# Patient Record
Sex: FEMALE | Race: WHITE | HISPANIC OR LATINO | Employment: OTHER | URBAN - METROPOLITAN AREA
[De-identification: names, ages, dates, MRNs, and addresses within clinical notes are randomized per-mention and may not be internally consistent; named-entity substitution may affect disease eponyms.]

---

## 2023-06-06 LAB
ANION GAP IN SER/PLAS: 13 MMOL/L (ref 10–20)
CALCIUM (MG/DL) IN SER/PLAS: 10.1 MG/DL (ref 8.6–10.3)
CARBON DIOXIDE, TOTAL (MMOL/L) IN SER/PLAS: 27 MMOL/L (ref 21–32)
CHLORIDE (MMOL/L) IN SER/PLAS: 104 MMOL/L (ref 98–107)
CREATININE (MG/DL) IN SER/PLAS: 0.9 MG/DL (ref 0.5–1.05)
ERYTHROCYTE DISTRIBUTION WIDTH (RATIO) BY AUTOMATED COUNT: 14.1 % (ref 11.5–14.5)
ERYTHROCYTE MEAN CORPUSCULAR HEMOGLOBIN CONCENTRATION (G/DL) BY AUTOMATED: 31.5 G/DL (ref 32–36)
ERYTHROCYTE MEAN CORPUSCULAR VOLUME (FL) BY AUTOMATED COUNT: 94 FL (ref 80–100)
ERYTHROCYTES (10*6/UL) IN BLOOD BY AUTOMATED COUNT: 4.56 X10E12/L (ref 4–5.2)
GFR FEMALE: 68 ML/MIN/1.73M2
GLUCOSE (MG/DL) IN SER/PLAS: 87 MG/DL (ref 74–99)
HEMATOCRIT (%) IN BLOOD BY AUTOMATED COUNT: 42.8 % (ref 36–46)
HEMOGLOBIN (G/DL) IN BLOOD: 13.5 G/DL (ref 12–16)
LEUKOCYTES (10*3/UL) IN BLOOD BY AUTOMATED COUNT: 5.7 X10E9/L (ref 4.4–11.3)
PLATELETS (10*3/UL) IN BLOOD AUTOMATED COUNT: 309 X10E9/L (ref 150–450)
POTASSIUM (MMOL/L) IN SER/PLAS: 4.4 MMOL/L (ref 3.5–5.3)
SODIUM (MMOL/L) IN SER/PLAS: 140 MMOL/L (ref 136–145)
UREA NITROGEN (MG/DL) IN SER/PLAS: 17 MG/DL (ref 6–23)

## 2023-06-22 ENCOUNTER — HOSPITAL ENCOUNTER (OUTPATIENT)
Dept: DATA CONVERSION | Facility: HOSPITAL | Age: 73
End: 2023-06-22
Attending: INTERNAL MEDICINE | Admitting: INTERNAL MEDICINE
Payer: MEDICARE

## 2023-06-22 DIAGNOSIS — R94.39 ABNORMAL RESULT OF OTHER CARDIOVASCULAR FUNCTION STUDY: ICD-10-CM

## 2023-06-22 DIAGNOSIS — I25.10 ATHEROSCLEROTIC HEART DISEASE OF NATIVE CORONARY ARTERY WITHOUT ANGINA PECTORIS: ICD-10-CM

## 2023-06-22 DIAGNOSIS — E88.810 METABOLIC SYNDROME: ICD-10-CM

## 2023-06-22 DIAGNOSIS — I10 ESSENTIAL (PRIMARY) HYPERTENSION: ICD-10-CM

## 2023-06-22 DIAGNOSIS — G62.9 POLYNEUROPATHY, UNSPECIFIED: ICD-10-CM

## 2023-06-22 DIAGNOSIS — R07.9 CHEST PAIN, UNSPECIFIED: ICD-10-CM

## 2023-06-22 DIAGNOSIS — K21.9 GASTRO-ESOPHAGEAL REFLUX DISEASE WITHOUT ESOPHAGITIS: ICD-10-CM

## 2023-06-22 DIAGNOSIS — J44.9 CHRONIC OBSTRUCTIVE PULMONARY DISEASE, UNSPECIFIED (MULTI): ICD-10-CM

## 2023-08-31 PROBLEM — L65.9 HAIR LOSS: Status: ACTIVE | Noted: 2023-08-31

## 2023-08-31 PROBLEM — M51.369 DEGENERATION OF LUMBAR INTERVERTEBRAL DISC: Status: ACTIVE | Noted: 2023-08-31

## 2023-08-31 PROBLEM — M51.36 DEGENERATION OF LUMBAR INTERVERTEBRAL DISC: Status: ACTIVE | Noted: 2023-08-31

## 2023-08-31 PROBLEM — G43.909 MIGRAINE: Status: ACTIVE | Noted: 2023-08-31

## 2023-08-31 PROBLEM — E78.00 ELEVATED CHOLESTEROL: Status: ACTIVE | Noted: 2023-08-31

## 2023-08-31 PROBLEM — B00.2 HERPES GINGIVOSTOMATITIS: Status: ACTIVE | Noted: 2023-08-31

## 2023-08-31 PROBLEM — M25.572 CHRONIC PAIN OF LEFT ANKLE: Status: ACTIVE | Noted: 2023-08-31

## 2023-08-31 PROBLEM — R20.0 NUMBNESS: Status: ACTIVE | Noted: 2023-08-31

## 2023-08-31 PROBLEM — K22.70 BARRETT'S ESOPHAGUS: Status: ACTIVE | Noted: 2023-08-31

## 2023-08-31 PROBLEM — R73.09 ELEVATED GLUCOSE: Status: ACTIVE | Noted: 2023-08-31

## 2023-08-31 PROBLEM — Z98.890 HISTORY OF NISSEN FUNDOPLICATION: Status: ACTIVE | Noted: 2023-08-31

## 2023-08-31 PROBLEM — E88.810 DYSMETABOLIC SYNDROME: Status: ACTIVE | Noted: 2023-08-31

## 2023-08-31 PROBLEM — G62.9 PERIPHERAL NEUROPATHY: Status: ACTIVE | Noted: 2023-08-31

## 2023-08-31 PROBLEM — I10 BENIGN ESSENTIAL HYPERTENSION: Status: ACTIVE | Noted: 2023-08-31

## 2023-08-31 PROBLEM — R06.02 CHRONIC SHORTNESS OF BREATH: Status: ACTIVE | Noted: 2023-08-31

## 2023-08-31 PROBLEM — K52.9 COLITIS: Status: ACTIVE | Noted: 2023-08-31

## 2023-08-31 PROBLEM — G89.29 CHRONIC PAIN OF LEFT ANKLE: Status: ACTIVE | Noted: 2023-08-31

## 2023-08-31 PROBLEM — R94.39 ABNORMAL STRESS ECHOCARDIOGRAM: Status: ACTIVE | Noted: 2023-08-31

## 2023-08-31 PROBLEM — J44.9 COPD (CHRONIC OBSTRUCTIVE PULMONARY DISEASE) (MULTI): Status: ACTIVE | Noted: 2023-08-31

## 2023-08-31 PROBLEM — M75.31 CALCIFIC TENDINITIS OF RIGHT SHOULDER: Status: ACTIVE | Noted: 2023-08-31

## 2023-08-31 PROBLEM — J18.9 PNEUMONIA: Status: ACTIVE | Noted: 2023-08-31

## 2023-08-31 PROBLEM — G56.01 RIGHT CARPAL TUNNEL SYNDROME: Status: ACTIVE | Noted: 2023-08-31

## 2023-08-31 PROBLEM — E78.2 MIXED HYPERLIPIDEMIA: Status: ACTIVE | Noted: 2023-08-31

## 2023-08-31 PROBLEM — M81.0 AGE-RELATED OSTEOPOROSIS WITHOUT CURRENT PATHOLOGICAL FRACTURE: Status: ACTIVE | Noted: 2023-08-31

## 2023-08-31 PROBLEM — R07.9 CHEST PAIN: Status: ACTIVE | Noted: 2023-08-31

## 2023-08-31 PROBLEM — R20.2 NOTALGIA PARESTHETICA: Status: ACTIVE | Noted: 2023-08-31

## 2023-08-31 PROBLEM — E55.9 VITAMIN D DEFICIENCY: Status: ACTIVE | Noted: 2023-08-31

## 2023-08-31 PROBLEM — K21.9 GERD WITHOUT ESOPHAGITIS: Status: ACTIVE | Noted: 2023-08-31

## 2023-08-31 RX ORDER — ESOMEPRAZOLE MAGNESIUM 40 MG/1
40 GRANULE, DELAYED RELEASE ORAL DAILY
COMMUNITY
Start: 2006-04-03 | End: 2023-10-03 | Stop reason: ALTCHOICE

## 2023-08-31 RX ORDER — DEXTROMETHORPHAN HYDROBROMIDE, GUAIFENESIN 5; 100 MG/5ML; MG/5ML
1300 LIQUID ORAL AS NEEDED
COMMUNITY

## 2023-08-31 RX ORDER — GABAPENTIN 600 MG/1
600 TABLET ORAL 3 TIMES DAILY
Status: ON HOLD | COMMUNITY
Start: 2021-09-17 | End: 2023-11-16 | Stop reason: SDUPTHER

## 2023-08-31 RX ORDER — ALBUTEROL SULFATE 90 UG/1
2 AEROSOL, METERED RESPIRATORY (INHALATION) EVERY 6 HOURS PRN
COMMUNITY

## 2023-08-31 RX ORDER — GLUCOSAM/CHONDRO/HERB 149/HYAL 750-100 MG
1 TABLET ORAL DAILY
COMMUNITY

## 2023-08-31 RX ORDER — TRIAMCINOLONE ACETONIDE 1 MG/G
1 CREAM TOPICAL 2 TIMES DAILY PRN
COMMUNITY
Start: 2022-12-27 | End: 2024-04-03 | Stop reason: SDUPTHER

## 2023-08-31 RX ORDER — LISINOPRIL 10 MG/1
10 TABLET ORAL DAILY
COMMUNITY
Start: 2021-09-17 | End: 2024-03-31

## 2023-08-31 RX ORDER — PANTOPRAZOLE SODIUM 40 MG/1
40 TABLET, DELAYED RELEASE ORAL DAILY
COMMUNITY
End: 2024-03-31

## 2023-08-31 RX ORDER — ZOLMITRIPTAN 5 MG/1
5 TABLET, FILM COATED ORAL
COMMUNITY
Start: 2010-11-23 | End: 2023-10-03 | Stop reason: ALTCHOICE

## 2023-08-31 RX ORDER — SIMVASTATIN 20 MG/1
20 TABLET, FILM COATED ORAL NIGHTLY
COMMUNITY
Start: 2021-11-26 | End: 2024-04-30

## 2023-08-31 RX ORDER — PREDNISONE 20 MG/1
20 TABLET ORAL 2 TIMES DAILY
COMMUNITY
End: 2023-10-03 | Stop reason: ALTCHOICE

## 2023-08-31 RX ORDER — ASPIRIN 81 MG/1
81 TABLET ORAL DAILY
COMMUNITY

## 2023-09-27 DIAGNOSIS — R55 SYNCOPE AND COLLAPSE: Primary | ICD-10-CM

## 2023-09-27 DIAGNOSIS — Z95.0 PRESENCE OF CARDIAC PACEMAKER: ICD-10-CM

## 2023-09-30 NOTE — H&P
History & Physical Reviewed:   I have reviewed the History and Physical dated:  24-May-2023   History and Physical reviewed and relevant findings noted. Patient examined to review pertinent physical  findings.: Significant findings noted below   Findings: Patient had episode of CP that was relieved  after taking 4 baby ASA. She underwent stress testing on 6-5-2023 and was abnormal stress echo with wall motion abnormalities in the septal wall.   Home Medications Reviewed: no changes noted   Allergies Reviewed: no changes noted       Airway/Sedation Assessment:  ·  Emotional Status calm   ·  Neurologic alert & oriented x 3   ·  Respiratory clear to auscultation   ·  Cardiovascular rhythm & rate regular   ·  Mouth Opening OK yes   ·  Neck Flexibility OK yes   ·  Loose Teeth no   ·  Oropharyngeal Classification Class III   ·  ASA PS Classification ASA II   ·  Sedation Plan moderate sedation       ERAS (Enhanced Recovery After Surgery):  ·  ERAS Patient: no     Consent:   COVID-19 Consent:  ·  COVID-19 Risk Consent Surgeon has reviewed key risks related to the risk of byron COVID-19 and if they contract COVID-19 what the risks are.     Assessment/Plan:   ·  Assessment and Plan    Impression: CP with abnormal stress echo     Plan: [facility]       Electronic Signatures:  Charlette Morris (APRN-CNP)  (Signed 22-Jun-2023 09:30)   Authored: History & Physical Reviewed, Airway/Sedation,  ERAS, Consent, Assessment/Plan, Note Completion      Last Updated: 22-Jun-2023 09:30 by Charlette Morris (APRN-CNP)

## 2023-10-03 ENCOUNTER — OFFICE VISIT (OUTPATIENT)
Dept: PULMONOLOGY | Facility: HOSPITAL | Age: 73
End: 2023-10-03
Payer: MEDICARE

## 2023-10-03 ENCOUNTER — PATIENT MESSAGE (OUTPATIENT)
Dept: PRIMARY CARE | Facility: CLINIC | Age: 73
End: 2023-10-03

## 2023-10-03 VITALS
DIASTOLIC BLOOD PRESSURE: 84 MMHG | RESPIRATION RATE: 16 BRPM | HEIGHT: 69 IN | BODY MASS INDEX: 28.08 KG/M2 | HEART RATE: 72 BPM | TEMPERATURE: 97.9 F | SYSTOLIC BLOOD PRESSURE: 132 MMHG | OXYGEN SATURATION: 97 % | WEIGHT: 189.6 LBS

## 2023-10-03 DIAGNOSIS — K21.9 GASTROESOPHAGEAL REFLUX DISEASE WITHOUT ESOPHAGITIS: ICD-10-CM

## 2023-10-03 DIAGNOSIS — Z87.891 FORMER SMOKER: ICD-10-CM

## 2023-10-03 DIAGNOSIS — J44.9 CHRONIC OBSTRUCTIVE PULMONARY DISEASE, UNSPECIFIED COPD TYPE (MULTI): Primary | ICD-10-CM

## 2023-10-03 DIAGNOSIS — J20.9 ACUTE BRONCHITIS, UNSPECIFIED ORGANISM: Primary | ICD-10-CM

## 2023-10-03 DIAGNOSIS — R91.8 MULTIPLE LUNG NODULES: ICD-10-CM

## 2023-10-03 DIAGNOSIS — J30.9 ALLERGIC RHINITIS, UNSPECIFIED SEASONALITY, UNSPECIFIED TRIGGER: ICD-10-CM

## 2023-10-03 PROCEDURE — 99213 OFFICE O/P EST LOW 20 MIN: CPT | Mod: ZK | Performed by: NURSE PRACTITIONER

## 2023-10-03 PROCEDURE — 3079F DIAST BP 80-89 MM HG: CPT | Performed by: NURSE PRACTITIONER

## 2023-10-03 PROCEDURE — 1160F RVW MEDS BY RX/DR IN RCRD: CPT | Performed by: NURSE PRACTITIONER

## 2023-10-03 PROCEDURE — 1159F MED LIST DOCD IN RCRD: CPT | Performed by: NURSE PRACTITIONER

## 2023-10-03 PROCEDURE — 99203 OFFICE O/P NEW LOW 30 MIN: CPT | Performed by: NURSE PRACTITIONER

## 2023-10-03 PROCEDURE — 3075F SYST BP GE 130 - 139MM HG: CPT | Performed by: NURSE PRACTITIONER

## 2023-10-03 RX ORDER — LORATADINE 10 MG/1
10 TABLET ORAL DAILY
Qty: 30 TABLET | Refills: 11 | Status: SHIPPED | OUTPATIENT
Start: 2023-10-03 | End: 2024-10-02

## 2023-10-03 RX ORDER — FLUTICASONE PROPIONATE 50 MCG
2 SPRAY, SUSPENSION (ML) NASAL AS NEEDED
COMMUNITY
Start: 2023-09-27 | End: 2023-11-08 | Stop reason: ALTCHOICE

## 2023-10-03 RX ORDER — BENZONATATE 200 MG/1
200 CAPSULE ORAL 3 TIMES DAILY PRN
Qty: 42 CAPSULE | Refills: 0 | Status: SHIPPED | OUTPATIENT
Start: 2023-10-03 | End: 2023-11-02

## 2023-10-03 RX ORDER — AZITHROMYCIN 250 MG/1
TABLET, FILM COATED ORAL
Qty: 6 TABLET | Refills: 0 | Status: SHIPPED | OUTPATIENT
Start: 2023-10-03 | End: 2023-10-08

## 2023-10-03 ASSESSMENT — ENCOUNTER SYMPTOMS
WHEEZING: 0
UNEXPECTED WEIGHT CHANGE: 0
SHORTNESS OF BREATH: 0
CHILLS: 0
FATIGUE: 0
COUGH: 1
RHINORRHEA: 1
FEVER: 0

## 2023-10-03 NOTE — PATIENT INSTRUCTIONS
Continue to take albuterol as needed.   Start taking Loratadine as needed for sinus drainage, continue Flonase as needed.   Please get CT scan of your chest in December for follow up.  Please get lung test done before next visit.   Call with any questions or concerns.   Follow up with me in mid January    ADD ADDITIONAL PRIOR PREGNANCY INFORMATION...

## 2023-10-03 NOTE — PROGRESS NOTES
Subjective   Patient ID: Edda Webb is a 73 y.o. female who presents for COPD follow up. She initially saw a  provider at [facility] but now would like to follow up with pulmonary closer to home.     HPI: Patient is here for COPD follow up. She is a former smoker at 1-2 ppd for 43 years total, quit at age 57. She states that his breathing has been stable and not currently needing albuterol. She states that she has increased sinus drainage recently and is taking Flonase. She does report snoring but denies hypersomnia or waking up breathless. She has no other concerns today.    Review of Systems   Constitutional:  Negative for chills, fatigue, fever and unexpected weight change.   HENT:  Positive for congestion, postnasal drip and rhinorrhea.    Respiratory:  Positive for cough. Negative for shortness of breath and wheezing.    Cardiovascular:  Negative for chest pain and leg swelling.   All other systems reviewed and are negative.      Objective   Physical Exam  Vitals reviewed.   Constitutional:       Appearance: Normal appearance.   HENT:      Head: Normocephalic.   Cardiovascular:      Rate and Rhythm: Normal rate and regular rhythm.   Pulmonary:      Effort: Pulmonary effort is normal.      Breath sounds: Decreased air movement present.   Neurological:      Mental Status: She is alert.         Assessment/Plan   1. COPD  2. Former smoker  3. Lung nodules  4. GERD  5. Allergic rhinitis    Plan:    -PFTs ordered today.   -PFTs from 2016 with moderate COPD FEV1/FVC 68, FEV1 78.  -Continue albuterol prn. Consider maintenance therapy on follow up based on PFTs.   -She is a former smoker at 1-2 ppd X 43 years and quit at age 57.   -CT chest was done for pneumonia follow up and patient was found to have a 6 mm and 4 mm lung nodule, follow up in 3 months was recommended and already has been ordered by prior pulmonary group, will follow results. It also shows mild emphysema and scarring. Discussed results.   -Continue  Flonase prn. Add Loratadine prn.   -GERD s/s currently controlled.     Overall we will get PFTs and follow up CT chest in December. I will bring her back with me in January. I instructed her to call sooner if needed.

## 2023-10-09 ENCOUNTER — HOSPITAL ENCOUNTER (OUTPATIENT)
Dept: CARDIOLOGY | Facility: HOSPITAL | Age: 73
Discharge: HOME | End: 2023-10-09
Payer: MEDICARE

## 2023-10-09 DIAGNOSIS — Z95.0 PRESENCE OF CARDIAC PACEMAKER: Primary | ICD-10-CM

## 2023-10-09 DIAGNOSIS — R55 SYNCOPE AND COLLAPSE: ICD-10-CM

## 2023-10-09 DIAGNOSIS — Z95.0 PRESENCE OF CARDIAC PACEMAKER: ICD-10-CM

## 2023-10-09 PROCEDURE — 93290 INTERROG DEV EVAL ICPMS IP: CPT | Performed by: INTERNAL MEDICINE

## 2023-10-09 PROCEDURE — 93290 INTERROG DEV EVAL ICPMS IP: CPT

## 2023-10-09 PROCEDURE — 93280 PM DEVICE PROGR EVAL DUAL: CPT | Performed by: INTERNAL MEDICINE

## 2023-10-17 ENCOUNTER — HOSPITAL ENCOUNTER (OUTPATIENT)
Dept: RESPIRATORY THERAPY | Facility: HOSPITAL | Age: 73
Discharge: HOME | End: 2023-10-17
Payer: MEDICARE

## 2023-10-17 DIAGNOSIS — J44.9 CHRONIC OBSTRUCTIVE PULMONARY DISEASE, UNSPECIFIED COPD TYPE (MULTI): ICD-10-CM

## 2023-10-17 LAB
MGC ASCENT PFT - FEV1 - POST: 2.09
MGC ASCENT PFT - FEV1 - PRE: 1.74
MGC ASCENT PFT - FEV1 - PREDICTED: 2.52
MGC ASCENT PFT - FVC - POST: 2.72
MGC ASCENT PFT - FVC - PRE: 2.61
MGC ASCENT PFT - FVC - PREDICTED: 3.31

## 2023-10-17 PROCEDURE — 2500000002 HC RX 250 W HCPCS SELF ADMINISTERED DRUGS (ALT 637 FOR MEDICARE OP, ALT 636 FOR OP/ED): Performed by: NURSE PRACTITIONER

## 2023-10-17 PROCEDURE — 94060 EVALUATION OF WHEEZING: CPT

## 2023-10-17 PROCEDURE — 94640 AIRWAY INHALATION TREATMENT: CPT | Mod: 59

## 2023-10-17 PROCEDURE — 94726 PLETHYSMOGRAPHY LUNG VOLUMES: CPT

## 2023-10-17 RX ORDER — ALBUTEROL SULFATE 90 UG/1
4 AEROSOL, METERED RESPIRATORY (INHALATION) ONCE
Status: COMPLETED | OUTPATIENT
Start: 2023-10-17 | End: 2023-10-17

## 2023-10-17 RX ADMIN — ALBUTEROL SULFATE 4 PUFF: 90 AEROSOL, METERED RESPIRATORY (INHALATION) at 13:21

## 2023-10-17 ASSESSMENT — PAIN SCALES - GENERAL: PAINLEVEL_OUTOF10: 0 - NO PAIN

## 2023-10-17 ASSESSMENT — PAIN - FUNCTIONAL ASSESSMENT: PAIN_FUNCTIONAL_ASSESSMENT: 0-10

## 2023-10-24 ENCOUNTER — OFFICE VISIT (OUTPATIENT)
Dept: ORTHOPEDIC SURGERY | Facility: CLINIC | Age: 73
End: 2023-10-24
Payer: MEDICARE

## 2023-10-24 DIAGNOSIS — M75.101 TEAR OF RIGHT ROTATOR CUFF, UNSPECIFIED TEAR EXTENT, UNSPECIFIED WHETHER TRAUMATIC: Primary | ICD-10-CM

## 2023-10-24 DIAGNOSIS — M19.011 OSTEOARTHRITIS OF RIGHT SHOULDER, UNSPECIFIED OSTEOARTHRITIS TYPE: ICD-10-CM

## 2023-10-24 DIAGNOSIS — Z01.818 PREOP EXAMINATION: ICD-10-CM

## 2023-10-24 PROCEDURE — 1125F AMNT PAIN NOTED PAIN PRSNT: CPT | Performed by: ORTHOPAEDIC SURGERY

## 2023-10-24 PROCEDURE — 99213 OFFICE O/P EST LOW 20 MIN: CPT | Performed by: ORTHOPAEDIC SURGERY

## 2023-10-24 PROCEDURE — 1160F RVW MEDS BY RX/DR IN RCRD: CPT | Performed by: ORTHOPAEDIC SURGERY

## 2023-10-24 PROCEDURE — 1159F MED LIST DOCD IN RCRD: CPT | Performed by: ORTHOPAEDIC SURGERY

## 2023-10-24 RX ORDER — SODIUM CHLORIDE, SODIUM LACTATE, POTASSIUM CHLORIDE, CALCIUM CHLORIDE 600; 310; 30; 20 MG/100ML; MG/100ML; MG/100ML; MG/100ML
100 INJECTION, SOLUTION INTRAVENOUS CONTINUOUS
Status: CANCELLED | OUTPATIENT
Start: 2023-10-24

## 2023-10-24 ASSESSMENT — PAIN - FUNCTIONAL ASSESSMENT: PAIN_FUNCTIONAL_ASSESSMENT: 0-10

## 2023-10-24 ASSESSMENT — ENCOUNTER SYMPTOMS
WOUND: 0
WHEEZING: 0
TROUBLE SWALLOWING: 0
CHILLS: 0
EYE DISCHARGE: 0
ARTHRALGIAS: 1
FEVER: 0
SHORTNESS OF BREATH: 0

## 2023-10-24 ASSESSMENT — PAIN SCALES - GENERAL: PAINLEVEL_OUTOF10: 8

## 2023-10-24 NOTE — PROGRESS NOTES
Reason for Appointment  Severe right shoulder pain    History of Present Illness  Patient is a 73 y.o. female here today for follow-up evaluation of severe right shoulder pain and dysfunction.  She continues to have severe pain in the shoulder, pseudoparalysis and is unable to do even simple daily tasks with the shoulder.  She has had injections that have not given her any significant long-term relief.  She has had a recent heart catheterization which was normal, but she does have a pacemaker. Pevious ultrasound has shown full-thickness tears of the rotator cuff and bursitis.  We have discussed a reverse shoulder replacement previously as being her best chance for long-term function and outcome.  She would like to discuss surgery today.  No other changes in her past medical history, allergies, or medications.    Past Medical History:   Diagnosis Date    Abnormal finding of blood chemistry, unspecified 08/08/2016    Abnormal blood chemistry    Abnormal weight gain 03/14/2014    Weight gain    Encounter for immunization 08/08/2016    Need for shingles vaccine    Other abnormal glucose 08/08/2016    Other abnormal glucose    Other conditions influencing health status     No significant past medical history    Pain in right shoulder 02/02/2016    Right shoulder pain    Pain in right shoulder 02/02/2016    Right shoulder pain    Personal history of diseases of the skin and subcutaneous tissue 08/08/2016    History of solar lentigo    Personal history of other diseases of the nervous system and sense organs 02/05/2016    History of polyneuropathy    Personal history of other specified conditions 08/08/2016    History of shortness of breath    Primary stabbing headache 08/08/2016    Primary stabbing headache       Past Surgical History:   Procedure Laterality Date    HYSTERECTOMY  02/02/2016    Hysterectomy    OTHER SURGICAL HISTORY  04/23/2015    Implantation Of Diaphragmatic Pacemaker       Medication Documentation  Review Audit       Reviewed by Kim Moran MA (Medical Assistant) on 10/24/23 at 1002      Medication Order Taking? Sig Documenting Provider Last Dose Status   acetaminophen (Tylenol 8 HOUR) 650 mg ER tablet [id number]  Take 2 tablets (1,300 mg) by mouth if needed. Do not crush, chew, or split. Shannan Lafleur MD  Active   albuterol (Proventil HFA) 90 mcg/actuation inhaler [id number]   Shannan Provider, MD  Active   aspirin 81 mg EC tablet [id number]  Take 1 tablet (81 mg) by mouth once daily. Shannan Lafleur MD  Active   benzonatate (Tessalon) 200 mg capsule [id number]  Take 1 capsule (200 mg) by mouth 3 times a day as needed for cough. Do not crush or chew. Lui Lock,   Active   fluticasone (Flonase) 50 mcg/actuation nasal spray [id number]  Administer 2 sprays into each nostril if needed. Shannan Lafleur MD  Active   gabapentin (Neurontin) 600 mg tablet [id number]  Take 1 tablet (600 mg) by mouth 4 times a day. Shannan Lafleur MD  Active   lisinopril 10 mg tablet [id number]  Take 1 tablet (10 mg) by mouth once daily. Shannan Provider, MD  Active   loratadine (Claritin) 10 mg tablet [id number]  Take 1 tablet (10 mg) by mouth once daily. Terri Chino, APRN-CNP  Active   multivit-minerals/folic acid (CENTRUM ADULT 50 PLUS ORAL) [id number]  Take 1 tablet by mouth once daily. Shannan Lafleur MD  Active   omega 3-dha-epa-fish oil (Fish OiL) 1,000 mg (120 mg-180 mg) capsule [id number]  Take 1 capsule (1,000 mg) by mouth once daily. Shannan Lafleur MD  Active   pantoprazole (ProtoNix) 40 mg EC tablet [id number]  Take 1 tablet (40 mg) by mouth once daily. Shannan Lafleur MD  Active   PAPAYA ENZYME ORAL [id number]  Take 2 tablets by mouth 2 times a day. Shannan Lafleur MD  Active   simvastatin (Zocor) 20 mg tablet [id number]  Take 1 tablet (20 mg) by mouth once daily at bedtime. Shannan Lafleur MD  Active   triamcinolone (Kenalog) 0.1 % cream [id number]  Apply 1  Application topically 2 times a day. Apply to affected area Historical Provider, MD  Active                    Allergies   Allergen Reactions    Codeine Hives and Rash       Review of Systems   Constitutional:  Negative for chills and fever.   HENT:  Negative for mouth sores and trouble swallowing.    Eyes:  Negative for discharge.   Respiratory:  Negative for shortness of breath and wheezing.    Cardiovascular:  Negative for chest pain.   Musculoskeletal:  Positive for arthralgias.   Skin:  Negative for rash and wound.   All other systems reviewed and are negative.    Exam   On exam right shoulder shows pseudoparalysis, only about 30 degrees of active forward flexion today, passively I am able to get her up to about 90 degrees with severe pain.  Deltoid is functional.  She has severe weakness with resisted external rotation, positive impingement signs on the right.  Good pulses and sensation in the upper extremity    Assessment   Encounter Diagnoses   Name Primary?    Tear of right rotator cuff, unspecified tear extent, unspecified whether traumatic Yes    Preop examination     Osteoarthritis of right shoulder, unspecified osteoarthritis type        Plan   We discussed operative treatment today in terms of a reverse shoulder replacement as being her best chance for long-term function and outcome.  She understands the risks of surgery including nerve, artery, tendon damage, infection, continued pain, need for future surgery and the lengthy recovery time needed.  With her significant cardiac history she will need full cardiac clearance prior to surgery.  She can call and schedule a right reverse shoulder replacement, we will order a CT scan for preoperative planning as well.    Written by Debi Stover PA-C

## 2023-10-27 ENCOUNTER — HOSPITAL ENCOUNTER (OUTPATIENT)
Dept: RADIOLOGY | Facility: HOSPITAL | Age: 73
Discharge: HOME | End: 2023-10-27
Payer: MEDICARE

## 2023-10-27 ENCOUNTER — EMPTY (OUTPATIENT)
Age: 73
End: 2023-10-27
Payer: MEDICARE

## 2023-10-27 DIAGNOSIS — M75.101 TEAR OF RIGHT ROTATOR CUFF, UNSPECIFIED TEAR EXTENT, UNSPECIFIED WHETHER TRAUMATIC: ICD-10-CM

## 2023-10-27 DIAGNOSIS — Z01.818 PREOP EXAMINATION: ICD-10-CM

## 2023-10-27 DIAGNOSIS — M19.011 OSTEOARTHRITIS OF RIGHT SHOULDER, UNSPECIFIED OSTEOARTHRITIS TYPE: ICD-10-CM

## 2023-10-27 PROCEDURE — 73200 CT UPPER EXTREMITY W/O DYE: CPT | Mod: RT,MG

## 2023-10-27 PROCEDURE — L3670 SO ACRO/CLAV CAN WEB PRE OTS: HCPCS | Performed by: ORTHOPAEDIC SURGERY

## 2023-10-30 ENCOUNTER — DOCUMENTATION (OUTPATIENT)
Dept: CARDIOLOGY | Facility: CLINIC | Age: 73
End: 2023-10-30
Payer: MEDICARE

## 2023-10-30 NOTE — PROGRESS NOTES
Request from Dr. Rome Bear for Right Reverse Shoulder Replacement on 11/15/23 with general/block  Fax# [phone]    Diagnosis/what are we seeing for:  PMH is significant for situational syncope (presumed heart block) s/p pacemaker insertion approximately 21 years ago, GERD s/p Nissen fundoplication and subsequent revision, Mayers's esophagus, HTN, COPD, dysmetabolic syndrome, hyperlipidemia, and peripheral neuropathy    Last ischemic work up left heart cath  Date: 23  [facility] 23: Non-obstructive coronary artery disease.     Anticoagulation:  N/A  Antiplatelet: ASA  Has patient had a recent cardioversion or ablation?  no     Last seen by  23 by Anita Vogel CNP    Next appointment:  24 with Dr. Valdes                       [address] [address]                   Phone# [phone]              Fax# [phone]      Date: 10/30/23    RE: Edda Webb            : 1950       Surgical/Procedural Clearance for:  Shoulder surgery  Patient is at: LOW cardiovascular risk for this LOW risk procedure.           Can hold Antiplatelet ASA for 3 days prior      N/A hold Anticoagulant    Is further cardiac workup is needed prior to the procedure?  No     Patient should continue Beta Blocker in the perioperative period.  Yes     Patient should resume antiplatelet/anticoagulation as soon as cleared by surgeon/procedure physician.  No       Thank You,    10/30/23 at 1:04 PM - Bandar Valdes MD

## 2023-10-31 ENCOUNTER — HOSPITAL ENCOUNTER (OUTPATIENT)
Dept: CARDIOLOGY | Facility: HOSPITAL | Age: 73
Discharge: HOME | End: 2023-10-31
Payer: MEDICARE

## 2023-10-31 DIAGNOSIS — Z95.0 PRESENCE OF CARDIAC PACEMAKER: ICD-10-CM

## 2023-10-31 DIAGNOSIS — R55 SYNCOPE AND COLLAPSE: ICD-10-CM

## 2023-11-08 ENCOUNTER — PRE-ADMISSION TESTING (OUTPATIENT)
Dept: PREADMISSION TESTING | Facility: HOSPITAL | Age: 73
End: 2023-11-08
Payer: MEDICARE

## 2023-11-08 VITALS
RESPIRATION RATE: 16 BRPM | TEMPERATURE: 97.7 F | OXYGEN SATURATION: 97 % | WEIGHT: 193.12 LBS | HEIGHT: 69 IN | DIASTOLIC BLOOD PRESSURE: 77 MMHG | SYSTOLIC BLOOD PRESSURE: 123 MMHG | HEART RATE: 73 BPM | BODY MASS INDEX: 28.6 KG/M2

## 2023-11-08 DIAGNOSIS — Z01.818 PREOPERATIVE TESTING: Primary | ICD-10-CM

## 2023-11-08 LAB
ALBUMIN SERPL BCP-MCNC: 4.5 G/DL (ref 3.4–5)
ALP SERPL-CCNC: 51 U/L (ref 33–136)
ALT SERPL W P-5'-P-CCNC: 26 U/L (ref 7–45)
ANION GAP SERPL CALC-SCNC: 11 MMOL/L (ref 10–20)
APPEARANCE UR: CLEAR
AST SERPL W P-5'-P-CCNC: 19 U/L (ref 9–39)
BILIRUB SERPL-MCNC: 0.3 MG/DL (ref 0–1.2)
BILIRUB UR STRIP.AUTO-MCNC: NEGATIVE MG/DL
BUN SERPL-MCNC: 24 MG/DL (ref 6–23)
CALCIUM SERPL-MCNC: 9.7 MG/DL (ref 8.6–10.3)
CHLORIDE SERPL-SCNC: 101 MMOL/L (ref 98–107)
CO2 SERPL-SCNC: 30 MMOL/L (ref 21–32)
COLOR UR: YELLOW
CREAT SERPL-MCNC: 1.01 MG/DL (ref 0.5–1.05)
ERYTHROCYTE [DISTWIDTH] IN BLOOD BY AUTOMATED COUNT: 13.3 % (ref 11.5–14.5)
GFR SERPL CREATININE-BSD FRML MDRD: 60 ML/MIN/1.73M*2
GLUCOSE SERPL-MCNC: 89 MG/DL (ref 74–99)
GLUCOSE UR STRIP.AUTO-MCNC: NEGATIVE MG/DL
HCT VFR BLD AUTO: 41.5 % (ref 36–46)
HGB BLD-MCNC: 13.2 G/DL (ref 12–16)
HOLD SPECIMEN: NORMAL
KETONES UR STRIP.AUTO-MCNC: NEGATIVE MG/DL
LEUKOCYTE ESTERASE UR QL STRIP.AUTO: NEGATIVE
MCH RBC QN AUTO: 29.8 PG (ref 26–34)
MCHC RBC AUTO-ENTMCNC: 31.8 G/DL (ref 32–36)
MCV RBC AUTO: 94 FL (ref 80–100)
NITRITE UR QL STRIP.AUTO: NEGATIVE
NRBC BLD-RTO: ABNORMAL /100{WBCS}
PH UR STRIP.AUTO: 6 [PH]
PLATELET # BLD AUTO: 320 X10*3/UL (ref 150–450)
POTASSIUM SERPL-SCNC: 4.2 MMOL/L (ref 3.5–5.3)
PROT SERPL-MCNC: 7 G/DL (ref 6.4–8.2)
PROT UR STRIP.AUTO-MCNC: NEGATIVE MG/DL
RBC # BLD AUTO: 4.43 X10*6/UL (ref 4–5.2)
RBC # UR STRIP.AUTO: NEGATIVE /UL
SODIUM SERPL-SCNC: 138 MMOL/L (ref 136–145)
SP GR UR STRIP.AUTO: 1.01
UROBILINOGEN UR STRIP.AUTO-MCNC: 0.2 MG/DL
WBC # BLD AUTO: 7.4 X10*3/UL (ref 4.4–11.3)

## 2023-11-08 PROCEDURE — 81003 URINALYSIS AUTO W/O SCOPE: CPT

## 2023-11-08 PROCEDURE — 80053 COMPREHEN METABOLIC PANEL: CPT

## 2023-11-08 PROCEDURE — 99214 OFFICE O/P EST MOD 30 MIN: CPT

## 2023-11-08 PROCEDURE — 85027 COMPLETE CBC AUTOMATED: CPT

## 2023-11-08 PROCEDURE — 36415 COLL VENOUS BLD VENIPUNCTURE: CPT

## 2023-11-08 PROCEDURE — 87081 CULTURE SCREEN ONLY: CPT

## 2023-11-08 RX ORDER — CHLORHEXIDINE GLUCONATE ORAL RINSE 1.2 MG/ML
15 SOLUTION DENTAL DAILY
Qty: 30 ML | Refills: 0 | Status: SHIPPED | OUTPATIENT
Start: 2023-11-08 | End: 2023-11-16 | Stop reason: HOSPADM

## 2023-11-08 ASSESSMENT — CHADS2 SCORE
PRIOR STROKE OR TIA OR THROMBOEMBOLISM: NO
CHF: NO
DIABETES: NO
CHADS2 SCORE: 1
AGE GREATER THAN OR EQUAL TO 75: NO
HYPERTENSION: YES

## 2023-11-08 ASSESSMENT — DUKE ACTIVITY SCORE INDEX (DASI)
CAN YOU HAVE SEXUAL RELATIONS: YES
CAN YOU WALK A BLOCK OR TWO ON LEVEL GROUND: YES
CAN YOU PARTICIPATE IN MODERATE RECREATIONAL ACTIVITIES LIKE GOLF, BOWLING, DANCING, DOUBLES TENNIS OR THROWING A BASEBALL OR FOOTBALL: YES
CAN YOU DO HEAVY WORK AROUND THE HOUSE LIKE SCRUBBING FLOORS OR LIFTING AND MOVING HEAVY FURNITURE: NO
CAN YOU DO LIGHT WORK AROUND THE HOUSE LIKE DUSTING OR WASHING DISHES: YES
CAN YOU PARTICIPATE IN STRENOUS SPORTS LIKE SWIMMING, SINGLES TENNIS, FOOTBALL, BASKETBALL, OR SKIING: NO
CAN YOU TAKE CARE OF YOURSELF (EAT, DRESS, BATHE, OR USE TOILET): YES
CAN YOU CLIMB A FLIGHT OF STAIRS OR WALK UP A HILL: YES
CAN YOU WALK INDOORS, SUCH AS AROUND YOUR HOUSE: YES
DASI METS SCORE: 6.5
TOTAL_SCORE: 30.2
CAN YOU DO MODERATE WORK AROUND THE HOUSE LIKE VACUUMING, SWEEPING FLOORS OR CARRYING GROCERIES: YES
CAN YOU DO YARD WORK LIKE RAKING LEAVES, WEEDING OR PUSHING A MOWER: NO
CAN YOU RUN A SHORT DISTANCE: NO

## 2023-11-08 ASSESSMENT — PAIN - FUNCTIONAL ASSESSMENT: PAIN_FUNCTIONAL_ASSESSMENT: 0-10

## 2023-11-08 ASSESSMENT — PAIN DESCRIPTION - DESCRIPTORS: DESCRIPTORS: ACHING

## 2023-11-08 ASSESSMENT — PAIN SCALES - GENERAL: PAINLEVEL_OUTOF10: 6

## 2023-11-08 NOTE — CPM/PAT H&P
CPM/PAT Evaluation       Name: Edda Webb (Edda Webb)  /Age: 1950/73 y.o.     In-Person       Chief Complaint: Unilateral primary OA of the right shoulder    HPI  Patient is a 74 y/o alert and oriented female coming in for PAT for a right reverse total shoulder arthroplasty scheduled on 11/15/2023 with Dr Bear. She reports right shoulder pain that she rates at a 5/10 and worsens with movement and lifting. Patient denies Cx pain, SOB, RIOS, and NVDC. Patient also denies Hx DVT/PE. Current medications were reviewed and a presurgical medication schedule was provided. He has no questions at this time.    +PERSONAL REPORTS OF REACTIONS TO ANESTHESIA-PONV  NO PERSONAL REPORTS OF FAMILY HISTORY OF REACTIONS TO ANESTHESIA  NO PERSONAL REPORTS OF METAL, NICKEL, OR SHELLFISH ALLERGY  FORMER SMOKER-QUIT SMOKING 25 YEARS AGO. PREVIOUSLY SMOKED 1.5 PPD X 30 YEARS  DAILY ETOH DRINK X 1/NO DRUGS    Past Medical History:   Diagnosis Date    Abnormal finding of blood chemistry, unspecified 2016    Abnormal blood chemistry    Abnormal weight gain 2014    Weight gain    Asthma     Delayed emergence from general anesthesia     Encounter for immunization 2016    Need for shingles vaccine    Hyperlipidemia     Hypertension     Other abnormal glucose 2016    Other abnormal glucose    Other conditions influencing health status     No significant past medical history    Pain in right shoulder 2016    Right shoulder pain    Pain in right shoulder 2016    Right shoulder pain    Peripheral neuropathy     Personal history of diseases of the skin and subcutaneous tissue 2016    History of solar lentigo    Personal history of other diseases of the nervous system and sense organs 2016    History of polyneuropathy    Personal history of other specified conditions 2016    History of shortness of breath    Pneumonia     2023    Primary stabbing headache 2016     Primary stabbing headache     Past Surgical History:   Procedure Laterality Date    APPENDECTOMY      HYSTERECTOMY  2016    Hysterectomy    LAPAROSCOPIC NISSEN FUNDOPLICATION      PACEMAKER PLACEMENT Left     WRIST FRACTURE SURGERY Left     with hardware     Family History   Problem Relation Name Age of Onset    Heart disease Mother      Hypertension Mother      Other (cardiac disorder) Mother      Diabetes Mother      Lung cancer Father      Hypertension Sister      Diabetes Sister      Cancer Sister      Cancer Brother      Breast cancer Sibling       Allergies   Allergen Reactions    Codeine Hives and Rash     Medication Documentation Review Audit       Reviewed by Ivett Aleman RN (Registered Nurse) on 23 at 1500      Medication Order Taking? Sig Documenting Provider Last Dose Status   acetaminophen (Tylenol 8 HOUR) 650 mg ER tablet [id number] Yes Take 2 tablets (1,300 mg) by mouth if needed. Do not crush, chew, or split. Historical Provider, MD 2023 Active   albuterol (Proventil HFA) 90 mcg/actuation inhaler [id number] No 2 puffs every 6 hours if needed for shortness of breath. Historical Provider, MD More than a month Active   aspirin 81 mg EC tablet [id number] Yes Take 1 tablet (81 mg) by mouth once daily. Historical Provider, MD 2023 Active   benzonatate (Tessalon) 200 mg capsule [id number]  Take 1 capsule (200 mg) by mouth 3 times a day as needed for cough. Do not crush or chew. Lui Lock,    23 9319   chlorhexidine (Peridex) 0.12 % solution [id number]  Use 15 mL in the mouth or throat once daily for 2 doses. 15 ML night before surgery and 15 ML morning of surgery. Derek Strong, APRN-CNP  Active     Discontinued 23 1458   gabapentin (Neurontin) 600 mg tablet [id number] Yes Take 1 tablet (600 mg) by mouth 3 times a day. One tab at BF and Lunch and 2 at HS Historical Provider, MD 2023 Active   lisinopril 10 mg tablet [id number] Yes Take 1  tablet (10 mg) by mouth once daily. Shannan Lafleur MD 11/8/2023 Active   loratadine (Claritin) 10 mg tablet [id number] Yes Take 1 tablet (10 mg) by mouth once daily. Terri Chino APRN-CNP Past Week Active   multivit-minerals/folic acid (CENTRUM ADULT 50 PLUS ORAL) [id number] Yes Take 1 tablet by mouth once daily. Shannan Lafleur MD 11/8/2023 Active   omega 3-dha-epa-fish oil (Fish OiL) 1,000 mg (120 mg-180 mg) capsule [id number] Yes Take 1 capsule (1,000 mg) by mouth once daily. Shannan Lafleur MD 11/8/2023 Active   pantoprazole (ProtoNix) 40 mg EC tablet [id number] Yes Take 1 tablet (40 mg) by mouth once daily. Shannan Lafleur MD 11/8/2023 Active   PAPAYA ENZYME ORAL [id number] Yes Take 2 tablets by mouth 2 times a day. Shannan Lafleur MD 11/8/2023 Active   simvastatin (Zocor) 20 mg tablet [id number] Yes Take 1 tablet (20 mg) by mouth once daily at bedtime. Shannan Lafleur MD 11/7/2023 Active   triamcinolone (Kenalog) 0.1 % cream [id number] Yes Apply 1 Application topically 2 times a day as needed. Apply to affected area Historical Provider, MD Past Week Active                        Review of Systems   Constitutional: Negative for chills, decreased appetite, diaphoresis, fever and malaise/fatigue.   Eyes:  Negative for blurred vision and double vision.   Cardiovascular:  Negative for chest pain, claudication, cyanosis, dyspnea on exertion, irregular heartbeat, leg swelling, near-syncope and palpitations.   Respiratory:  Negative for cough, hemoptysis, shortness of breath and wheezing.    Endocrine: Negative for cold intolerance, heat intolerance, polydipsia, polyphagia and polyuria.   Gastrointestinal:  Negative for abdominal pain, constipation, diarrhea, dysphagia, nausea and vomiting.   Genitourinary:  Negative for bladder incontinence, dysuria, hematuria, incomplete emptying, nocturia, pelvic pain and urgency.   Neurological:  Negative for headaches, light-headedness, paresthesias,  "sensory change and weakness.   Psychiatric/Behavioral:  Negative for altered mental status.       Vitals and nursing note reviewed. Physical exam within normal limits.   Constitutional:       Appearance: Normal appearance. She is normal weight.   HENT:      Head: Normocephalic and atraumatic.      Mouth/Throat:      Mouth: Mucous membranes are moist.      Pharynx: Oropharynx is clear.   Eyes:      Extraocular Movements: Extraocular movements intact.      Conjunctiva/sclera: Conjunctivae normal.      Pupils: Pupils are equal, round, and reactive to light.   Cardiovascular:      Rate and Rhythm: Normal rate and regular rhythm.      Pulses: Normal pulses.      Heart sounds: Normal heart sounds.   Pulmonary:      Effort: Pulmonary effort is normal.      Breath sounds: Normal breath sounds.   Abdominal:      General: Abdomen is flat. Bowel sounds are normal.      Palpations: Abdomen is soft.   Musculoskeletal:      Cervical back: Normal range of motion and neck supple.   Skin:     General: Skin is warm and dry.      Capillary Refill: Capillary refill takes less than 2 seconds.   Neurological:      General: No focal deficit present.      Mental Status: She is alert and oriented to person, place, and time. Mental status is at baseline.   Psychiatric:         Mood and Affect: Mood normal.         Behavior: Behavior normal.         Thought Content: Thought content normal.         Judgment: Judgment normal.     PAT AIRWAY:   Airway:     Mallampati::  I    TM distance::  >3 FB    Neck ROM::  Full    Visit Vitals  /77   Pulse 73   Temp 36.5 °C (97.7 °F) (Temporal)   Resp 16   Ht 1.753 m (5' 9\")   Wt 87.6 kg (193 lb 2 oz)   SpO2 97%   BMI 28.52 kg/m²   Smoking Status Former   BSA 2.07 m²      EKG COMPLETED 6/12/2023 SINUS RHYTHM WITH PAC. NO ACUTE CHANGES-SEE EPIC  ASYMPTOMATIC WITH GOOD FUNCTIONAL STATUS METS 6.5    DASI Risk Score      Flowsheet Row Most Recent Value   DASI SCORE 30.2   METS Score (Will be calculated " only when all the questions are answered) 6.5          Caprini DVT Assessment      Flowsheet Row Most Recent Value   DVT Score 6   Current Status Major surgery planned, including arthroscopic and laproscopic (1-2 hours)   History COPD   Age 60-75 years   BMI 30 or less          Modified Frailty Index      Flowsheet Row Most Recent Value   Modified Frailty Index Calculator .0909          CHADS2 Stroke Risk  Current as of 14 minutes ago        N/A 3 - 100%: High Risk   2 - 3%: Medium Risk   0 - 2%: Low Risk     Last Change: N/A          This score determines the patient's risk of having a stroke if the patient has atrial fibrillation.        This score is not applicable to this patient. Components are not calculated.          Revised Cardiac Risk Index      Flowsheet Row Most Recent Value   Revised Cardiac Risk Calculator 0          Apfel Simplified Score    No data to display       Risk Analysis Index Results This Encounter    No data found in the last 1 encounters.       Stop Bang Score      Flowsheet Row Most Recent Value   Do you snore loudly? 1   Do you often feel tired or fatigued after your sleep? 0   Has anyone ever observed you stop breathing in your sleep? 0   Do you have or are you being treated for high blood pressure? 1   Recent BMI (Calculated) 28.5   Is BMI greater than 35 kg/m2? 0=No   Age older than 50 years old? 1=Yes   Is your neck circumference greater than 17 inches (Male) or 16 inches (Female)? 0   Gender - Male 0=No   STOP-BANG Total Score 3            Assessment and Plan:     Unilateral primary OA of the right shoulder-right reverse total shoulder arthroplasty scheduled on 11/15/2023 with Dr Bear.    Coronary artery disease-Nonobstructive-see cardiac cath report from 6/22/2023 [facility]. No significant stenosis. She denies chest pain, SOB, and RIOS. Last EKG completed 6/12/2023 NSR with PAC. No acute changes.    Cardiac pacemaker-Placed for heart block 2001. Last device check completed  10/31/2023-in epic.     COPD-Managed with albuterol (Proventil HFA) 90 mcg/actuation inhaler. LSCTAB. Followed by pulmonology, last visit 10/3/23. Pft completed 10/17/23 in epic. +Bronchodilator response  Hypertension-Managed with lisinopril 10 mg tablet. BP in /77    GERD-Managed with pantoprazole (ProtoNix) 40 mg EC tablet    Preoperative risk assessment  ASA III  RCRI-1 POINTS CLASS II RISK 6.0%  STOP-BANGS-3 POINTS MODERATE RISK FOR WINSOME  NSQIP-PREDICTED LENGTH OF STAY 1.5 DAYS  ARISCAT-3 POINTS LOW RISK 1.6%  DASI-30.2 POINTS. 6.5 METS  NIGEL-0.8%  JHFRAT-7 POINTS MODERATE RISK FOR FALLS  [id number]/CAD  PAT TESTING-CBC, CMP, UA, MRSA PCR, EKG  CLORHEXIDINE .12% DENTAL RINSE E-PRESCRIBED PER  INFECTION PREVENTION PROTOCOL. PATIENT EDUCATED.    *CLEARED FOR SURGERY PENDING LABS/EKG. CARDIAC RISK STRATIFICATION PROVIDED BY DR HECK. LOW RISK. DC ASPIRIN 3 DAYS PREOPERATIVELY. PATIENT IS AWARE AND HAS NO QUESTIONS AT THIS TIME. CLEARANCE NOTE IN [facility] UNDER ENCOUNTERS. CASE REVIEWED WITH DR WALLS FROM ANESTHESIA. PATIENT CLEARED FOR SURGERY AT THIS TIME. WILL REASSESS ON DOS.   LABS REVIEWED, STABLE. OK TO PROCEED    *FACE TO FACE TIME 25 MINUTES.

## 2023-11-08 NOTE — H&P (VIEW-ONLY)
CPM/PAT Evaluation       Name: Edda Webb (Edda Webb)  /Age: 1950/73 y.o.     In-Person       Chief Complaint: Unilateral primary OA of the right shoulder    HPI  Patient is a 74 y/o alert and oriented female coming in for PAT for a right reverse total shoulder arthroplasty scheduled on 11/15/2023 with Dr Bear. She reports right shoulder pain that she rates at a 5/10 and worsens with movement and lifting. Patient denies Cx pain, SOB, RIOS, and NVDC. Patient also denies Hx DVT/PE. Current medications were reviewed and a presurgical medication schedule was provided. He has no questions at this time.    +PERSONAL REPORTS OF REACTIONS TO ANESTHESIA-PONV  NO PERSONAL REPORTS OF FAMILY HISTORY OF REACTIONS TO ANESTHESIA  NO PERSONAL REPORTS OF METAL, NICKEL, OR SHELLFISH ALLERGY  FORMER SMOKER-QUIT SMOKING 25 YEARS AGO. PREVIOUSLY SMOKED 1.5 PPD X 30 YEARS  DAILY ETOH DRINK X 1/NO DRUGS    Past Medical History:   Diagnosis Date    Abnormal finding of blood chemistry, unspecified 2016    Abnormal blood chemistry    Abnormal weight gain 2014    Weight gain    Asthma     Delayed emergence from general anesthesia     Encounter for immunization 2016    Need for shingles vaccine    Hyperlipidemia     Hypertension     Other abnormal glucose 2016    Other abnormal glucose    Other conditions influencing health status     No significant past medical history    Pain in right shoulder 2016    Right shoulder pain    Pain in right shoulder 2016    Right shoulder pain    Peripheral neuropathy     Personal history of diseases of the skin and subcutaneous tissue 2016    History of solar lentigo    Personal history of other diseases of the nervous system and sense organs 2016    History of polyneuropathy    Personal history of other specified conditions 2016    History of shortness of breath    Pneumonia     2023    Primary stabbing headache 2016     Primary stabbing headache     Past Surgical History:   Procedure Laterality Date    APPENDECTOMY      HYSTERECTOMY  2016    Hysterectomy    LAPAROSCOPIC NISSEN FUNDOPLICATION      PACEMAKER PLACEMENT Left     WRIST FRACTURE SURGERY Left     with hardware     Family History   Problem Relation Name Age of Onset    Heart disease Mother      Hypertension Mother      Other (cardiac disorder) Mother      Diabetes Mother      Lung cancer Father      Hypertension Sister      Diabetes Sister      Cancer Sister      Cancer Brother      Breast cancer Sibling       Allergies   Allergen Reactions    Codeine Hives and Rash     Medication Documentation Review Audit       Reviewed by Ivett Aleman RN (Registered Nurse) on 23 at 1500      Medication Order Taking? Sig Documenting Provider Last Dose Status   acetaminophen (Tylenol 8 HOUR) 650 mg ER tablet [id number] Yes Take 2 tablets (1,300 mg) by mouth if needed. Do not crush, chew, or split. Historical Provider, MD 2023 Active   albuterol (Proventil HFA) 90 mcg/actuation inhaler [id number] No 2 puffs every 6 hours if needed for shortness of breath. Historical Provider, MD More than a month Active   aspirin 81 mg EC tablet [id number] Yes Take 1 tablet (81 mg) by mouth once daily. Historical Provider, MD 2023 Active   benzonatate (Tessalon) 200 mg capsule [id number]  Take 1 capsule (200 mg) by mouth 3 times a day as needed for cough. Do not crush or chew. Lui Lock,    23 4859   chlorhexidine (Peridex) 0.12 % solution [id number]  Use 15 mL in the mouth or throat once daily for 2 doses. 15 ML night before surgery and 15 ML morning of surgery. Derek Strong, APRN-CNP  Active     Discontinued 23 1458   gabapentin (Neurontin) 600 mg tablet [id number] Yes Take 1 tablet (600 mg) by mouth 3 times a day. One tab at BF and Lunch and 2 at HS Historical Provider, MD 2023 Active   lisinopril 10 mg tablet [id number] Yes Take 1  tablet (10 mg) by mouth once daily. Shannan Lafleur MD 11/8/2023 Active   loratadine (Claritin) 10 mg tablet [id number] Yes Take 1 tablet (10 mg) by mouth once daily. Terri Chino APRN-CNP Past Week Active   multivit-minerals/folic acid (CENTRUM ADULT 50 PLUS ORAL) [id number] Yes Take 1 tablet by mouth once daily. Shannan Lafleur MD 11/8/2023 Active   omega 3-dha-epa-fish oil (Fish OiL) 1,000 mg (120 mg-180 mg) capsule [id number] Yes Take 1 capsule (1,000 mg) by mouth once daily. Shannan Lafleur MD 11/8/2023 Active   pantoprazole (ProtoNix) 40 mg EC tablet [id number] Yes Take 1 tablet (40 mg) by mouth once daily. Shannan Lafleur MD 11/8/2023 Active   PAPAYA ENZYME ORAL [id number] Yes Take 2 tablets by mouth 2 times a day. Shannan Lafleur MD 11/8/2023 Active   simvastatin (Zocor) 20 mg tablet [id number] Yes Take 1 tablet (20 mg) by mouth once daily at bedtime. Shannan Lafleur MD 11/7/2023 Active   triamcinolone (Kenalog) 0.1 % cream [id number] Yes Apply 1 Application topically 2 times a day as needed. Apply to affected area Historical Provider, MD Past Week Active                        Review of Systems   Constitutional: Negative for chills, decreased appetite, diaphoresis, fever and malaise/fatigue.   Eyes:  Negative for blurred vision and double vision.   Cardiovascular:  Negative for chest pain, claudication, cyanosis, dyspnea on exertion, irregular heartbeat, leg swelling, near-syncope and palpitations.   Respiratory:  Negative for cough, hemoptysis, shortness of breath and wheezing.    Endocrine: Negative for cold intolerance, heat intolerance, polydipsia, polyphagia and polyuria.   Gastrointestinal:  Negative for abdominal pain, constipation, diarrhea, dysphagia, nausea and vomiting.   Genitourinary:  Negative for bladder incontinence, dysuria, hematuria, incomplete emptying, nocturia, pelvic pain and urgency.   Neurological:  Negative for headaches, light-headedness, paresthesias,  "sensory change and weakness.   Psychiatric/Behavioral:  Negative for altered mental status.       Vitals and nursing note reviewed. Physical exam within normal limits.   Constitutional:       Appearance: Normal appearance. She is normal weight.   HENT:      Head: Normocephalic and atraumatic.      Mouth/Throat:      Mouth: Mucous membranes are moist.      Pharynx: Oropharynx is clear.   Eyes:      Extraocular Movements: Extraocular movements intact.      Conjunctiva/sclera: Conjunctivae normal.      Pupils: Pupils are equal, round, and reactive to light.   Cardiovascular:      Rate and Rhythm: Normal rate and regular rhythm.      Pulses: Normal pulses.      Heart sounds: Normal heart sounds.   Pulmonary:      Effort: Pulmonary effort is normal.      Breath sounds: Normal breath sounds.   Abdominal:      General: Abdomen is flat. Bowel sounds are normal.      Palpations: Abdomen is soft.   Musculoskeletal:      Cervical back: Normal range of motion and neck supple.   Skin:     General: Skin is warm and dry.      Capillary Refill: Capillary refill takes less than 2 seconds.   Neurological:      General: No focal deficit present.      Mental Status: She is alert and oriented to person, place, and time. Mental status is at baseline.   Psychiatric:         Mood and Affect: Mood normal.         Behavior: Behavior normal.         Thought Content: Thought content normal.         Judgment: Judgment normal.     PAT AIRWAY:   Airway:     Mallampati::  I    TM distance::  >3 FB    Neck ROM::  Full    Visit Vitals  /77   Pulse 73   Temp 36.5 °C (97.7 °F) (Temporal)   Resp 16   Ht 1.753 m (5' 9\")   Wt 87.6 kg (193 lb 2 oz)   SpO2 97%   BMI 28.52 kg/m²   Smoking Status Former   BSA 2.07 m²      EKG COMPLETED 6/12/2023 SINUS RHYTHM WITH PAC. NO ACUTE CHANGES-SEE EPIC  ASYMPTOMATIC WITH GOOD FUNCTIONAL STATUS METS 6.5    DASI Risk Score      Flowsheet Row Most Recent Value   DASI SCORE 30.2   METS Score (Will be calculated " only when all the questions are answered) 6.5          Caprini DVT Assessment      Flowsheet Row Most Recent Value   DVT Score 6   Current Status Major surgery planned, including arthroscopic and laproscopic (1-2 hours)   History COPD   Age 60-75 years   BMI 30 or less          Modified Frailty Index      Flowsheet Row Most Recent Value   Modified Frailty Index Calculator .0909          CHADS2 Stroke Risk  Current as of 14 minutes ago        N/A 3 - 100%: High Risk   2 - 3%: Medium Risk   0 - 2%: Low Risk     Last Change: N/A          This score determines the patient's risk of having a stroke if the patient has atrial fibrillation.        This score is not applicable to this patient. Components are not calculated.          Revised Cardiac Risk Index      Flowsheet Row Most Recent Value   Revised Cardiac Risk Calculator 0          Apfel Simplified Score    No data to display       Risk Analysis Index Results This Encounter    No data found in the last 1 encounters.       Stop Bang Score      Flowsheet Row Most Recent Value   Do you snore loudly? 1   Do you often feel tired or fatigued after your sleep? 0   Has anyone ever observed you stop breathing in your sleep? 0   Do you have or are you being treated for high blood pressure? 1   Recent BMI (Calculated) 28.5   Is BMI greater than 35 kg/m2? 0=No   Age older than 50 years old? 1=Yes   Is your neck circumference greater than 17 inches (Male) or 16 inches (Female)? 0   Gender - Male 0=No   STOP-BANG Total Score 3            Assessment and Plan:     Unilateral primary OA of the right shoulder-right reverse total shoulder arthroplasty scheduled on 11/15/2023 with Dr Bear.    Coronary artery disease-Nonobstructive-see cardiac cath report from 6/22/2023 [facility]. No significant stenosis. She denies chest pain, SOB, and RIOS. Last EKG completed 6/12/2023 NSR with PAC. No acute changes.    Cardiac pacemaker-Placed for heart block 2001. Last device check completed  10/31/2023-in epic.     COPD-Managed with albuterol (Proventil HFA) 90 mcg/actuation inhaler. LSCTAB. Followed by pulmonology, last visit 10/3/23. Pft completed 10/17/23 in epic. +Bronchodilator response  Hypertension-Managed with lisinopril 10 mg tablet. BP in /77    GERD-Managed with pantoprazole (ProtoNix) 40 mg EC tablet    Preoperative risk assessment  ASA III  RCRI-1 POINTS CLASS II RISK 6.0%  STOP-BANGS-3 POINTS MODERATE RISK FOR WINSOME  NSQIP-PREDICTED LENGTH OF STAY 1.5 DAYS  ARISCAT-3 POINTS LOW RISK 1.6%  DASI-30.2 POINTS. 6.5 METS  NIGEL-0.8%  JHFRAT-7 POINTS MODERATE RISK FOR FALLS  YCQGYOMKP-HZNMIGR-SOHKIEAQN/CAD  PAT TESTING-CBC, CMP, UA, MRSA PCR, EKG  CLORHEXIDINE .12% DENTAL RINSE E-PRESCRIBED PER  INFECTION PREVENTION PROTOCOL. PATIENT EDUCATED.    *CLEARED FOR SURGERY PENDING LABS/EKG. CARDIAC RISK STRATIFICATION PROVIDED BY DR HECK. LOW RISK. DC ASPIRIN 3 DAYS PREOPERATIVELY. PATIENT IS AWARE AND HAS NO QUESTIONS AT THIS TIME. CLEARANCE NOTE IN [facility] UNDER ENCOUNTERS. CASE REVIEWED WITH DR WALLS FROM ANESTHESIA. PATIENT CLEARED FOR SURGERY AT THIS TIME. WILL REASSESS ON DOS.   LABS REVIEWED, STABLE. OK TO PROCEED    *FACE TO FACE TIME 25 MINUTES.

## 2023-11-08 NOTE — PREPROCEDURE INSTRUCTIONS
Medication List            Accurate as of November 8, 2023  3:11 PM. Always use your most recent med list.                acetaminophen 650 mg ER tablet  Commonly known as: Tylenol 8 HOUR  Medication Adjustments for Surgery: Take morning of surgery with sip of water, no other fluids     aspirin 81 mg EC tablet  Medication Adjustments for Surgery: Other (Comment)  Notes to patient: Instructions per prescriber     CENTRUM ADULT 50 PLUS ORAL  Medication Adjustments for Surgery: Stop 7 days before surgery     chlorhexidine 0.12 % solution  Commonly known as: Peridex  Use 15 mL in the mouth or throat once daily for 2 doses. 15 ML night before surgery and 15 ML morning of surgery. Swish and spit     Fish OiL 1,000 mg (120 mg-180 mg) capsule  Generic drug: omega 3-dha-epa-fish oil  Medication Adjustments for Surgery: Stop 7 days before surgery     gabapentin 600 mg tablet  Commonly known as: Neurontin  Medication Adjustments for Surgery: Take morning of surgery with sip of water, no other fluids     lisinopril 10 mg tablet  Medication Adjustments for Surgery: Stop 1 day before surgery     loratadine 10 mg tablet  Commonly known as: Claritin  Take 1 tablet (10 mg) by mouth once daily.  Medication Adjustments for Surgery: Take morning of surgery with sip of water, no other fluids     pantoprazole 40 mg EC tablet  Commonly known as: ProtoNix  Medication Adjustments for Surgery: Take morning of surgery with sip of water, no other fluids     PAPAYA ENZYME ORAL  Medication Adjustments for Surgery: Stop 7 days before surgery     Proventil HFA 90 mcg/actuation inhaler  Generic drug: albuterol  Medication Adjustments for Surgery: Take morning of surgery with sip of water, no other fluids     simvastatin 20 mg tablet  Commonly known as: Zocor  Medication Adjustments for Surgery: Continue until night before surgery     triamcinolone 0.1 % cream  Commonly known as: Kenalog  Medication Adjustments for Surgery: Stop 1 day before  surgery                              NPO Instructions:    Do not eat any food after midnight the night before your surgery/procedure.    Additional Instructions:     Seven/Six Days before Surgery:  Review your medication instructions, stop indicated medications  Five Days before Surgery:  Review your medication instructions, stop indicated medications  Three Days before Surgery:  Review your medication instructions, stop indicated medications  The Day before Surgery:  Review your medication instructions, stop indicated medications  You will be contacted regarding the time of your arrival to facility and surgery time  Do not eat any food after Midnight  Day of Surgery:  Review your medication instructions, take indicated medications  Wear  comfortable loose fitting clothing  Do not use moisturizers, creams, lotions or perfume  All jewelry and valuables should be left at home

## 2023-11-10 LAB — STAPHYLOCOCCUS SPEC CULT: NORMAL

## 2023-11-13 ENCOUNTER — ANESTHESIA EVENT (OUTPATIENT)
Dept: OPERATING ROOM | Facility: HOSPITAL | Age: 73
End: 2023-11-13
Payer: MEDICARE

## 2023-11-15 ENCOUNTER — APPOINTMENT (OUTPATIENT)
Dept: RADIOLOGY | Facility: HOSPITAL | Age: 73
End: 2023-11-15
Payer: MEDICARE

## 2023-11-15 ENCOUNTER — HOSPITAL ENCOUNTER (OUTPATIENT)
Facility: HOSPITAL | Age: 73
Setting detail: OBSERVATION
Discharge: HOME | End: 2023-11-16
Attending: ORTHOPAEDIC SURGERY | Admitting: ORTHOPAEDIC SURGERY
Payer: MEDICARE

## 2023-11-15 ENCOUNTER — ANESTHESIA (OUTPATIENT)
Dept: OPERATING ROOM | Facility: HOSPITAL | Age: 73
End: 2023-11-15
Payer: MEDICARE

## 2023-11-15 DIAGNOSIS — G60.3 IDIOPATHIC PROGRESSIVE NEUROPATHY: ICD-10-CM

## 2023-11-15 DIAGNOSIS — M75.101 TEAR OF RIGHT ROTATOR CUFF, UNSPECIFIED TEAR EXTENT, UNSPECIFIED WHETHER TRAUMATIC: Primary | ICD-10-CM

## 2023-11-15 DIAGNOSIS — M19.011 PRIMARY OSTEOARTHRITIS OF RIGHT SHOULDER: ICD-10-CM

## 2023-11-15 DIAGNOSIS — J41.0 SIMPLE CHRONIC BRONCHITIS (MULTI): ICD-10-CM

## 2023-11-15 DIAGNOSIS — M19.011 OSTEOARTHRITIS OF RIGHT SHOULDER, UNSPECIFIED OSTEOARTHRITIS TYPE: ICD-10-CM

## 2023-11-15 PROCEDURE — A23472 PR RECONSTR TOTAL SHOULDER IMPLANT: Performed by: NURSE ANESTHETIST, CERTIFIED REGISTERED

## 2023-11-15 PROCEDURE — 2740000001 HC OR 274 NO HCPCS: Performed by: ORTHOPAEDIC SURGERY

## 2023-11-15 PROCEDURE — 2500000004 HC RX 250 GENERAL PHARMACY W/ HCPCS (ALT 636 FOR OP/ED): Performed by: ANESTHESIOLOGY

## 2023-11-15 PROCEDURE — 2500000001 HC RX 250 WO HCPCS SELF ADMINISTERED DRUGS (ALT 637 FOR MEDICARE OP): Performed by: EMERGENCY MEDICINE

## 2023-11-15 PROCEDURE — 23430 REPAIR BICEPS TENDON: CPT | Performed by: ORTHOPAEDIC SURGERY

## 2023-11-15 PROCEDURE — 2500000001 HC RX 250 WO HCPCS SELF ADMINISTERED DRUGS (ALT 637 FOR MEDICARE OP): Performed by: ANESTHESIOLOGY

## 2023-11-15 PROCEDURE — 96365 THER/PROPH/DIAG IV INF INIT: CPT | Performed by: ORTHOPAEDIC SURGERY

## 2023-11-15 PROCEDURE — C1776 JOINT DEVICE (IMPLANTABLE): HCPCS | Performed by: ORTHOPAEDIC SURGERY

## 2023-11-15 PROCEDURE — 2780000003 HC OR 278 NO HCPCS: Performed by: ORTHOPAEDIC SURGERY

## 2023-11-15 PROCEDURE — 3600000010 HC OR TIME - EACH INCREMENTAL 1 MINUTE - PROCEDURE LEVEL FIVE: Performed by: ORTHOPAEDIC SURGERY

## 2023-11-15 PROCEDURE — 96361 HYDRATE IV INFUSION ADD-ON: CPT | Performed by: ORTHOPAEDIC SURGERY

## 2023-11-15 PROCEDURE — 73020 X-RAY EXAM OF SHOULDER: CPT | Mod: RT

## 2023-11-15 PROCEDURE — 2500000005 HC RX 250 GENERAL PHARMACY W/O HCPCS: Performed by: PHYSICIAN ASSISTANT

## 2023-11-15 PROCEDURE — 96367 TX/PROPH/DG ADDL SEQ IV INF: CPT | Performed by: ORTHOPAEDIC SURGERY

## 2023-11-15 PROCEDURE — 3700000001 HC GENERAL ANESTHESIA TIME - INITIAL BASE CHARGE: Performed by: ORTHOPAEDIC SURGERY

## 2023-11-15 PROCEDURE — 23472 RECONSTRUCT SHOULDER JOINT: CPT | Performed by: PHYSICIAN ASSISTANT

## 2023-11-15 PROCEDURE — 2720000007 HC OR 272 NO HCPCS: Performed by: ORTHOPAEDIC SURGERY

## 2023-11-15 PROCEDURE — G0378 HOSPITAL OBSERVATION PER HR: HCPCS

## 2023-11-15 PROCEDURE — 2500000004 HC RX 250 GENERAL PHARMACY W/ HCPCS (ALT 636 FOR OP/ED): Performed by: PHYSICIAN ASSISTANT

## 2023-11-15 PROCEDURE — 23472 RECONSTRUCT SHOULDER JOINT: CPT | Performed by: ORTHOPAEDIC SURGERY

## 2023-11-15 PROCEDURE — 3600000005 HC OR TIME - INITIAL BASE CHARGE - PROCEDURE LEVEL FIVE: Performed by: ORTHOPAEDIC SURGERY

## 2023-11-15 PROCEDURE — 99100 ANES PT EXTEME AGE<1 YR&>70: CPT | Performed by: ANESTHESIOLOGY

## 2023-11-15 PROCEDURE — A23472 PR RECONSTR TOTAL SHOULDER IMPLANT: Performed by: ANESTHESIOLOGY

## 2023-11-15 PROCEDURE — 2500000001 HC RX 250 WO HCPCS SELF ADMINISTERED DRUGS (ALT 637 FOR MEDICARE OP): Performed by: PHYSICIAN ASSISTANT

## 2023-11-15 PROCEDURE — 76942 ECHO GUIDE FOR BIOPSY: CPT | Performed by: ANESTHESIOLOGY

## 2023-11-15 PROCEDURE — 94760 N-INVAS EAR/PLS OXIMETRY 1: CPT

## 2023-11-15 PROCEDURE — 7100000001 HC RECOVERY ROOM TIME - INITIAL BASE CHARGE: Performed by: ORTHOPAEDIC SURGERY

## 2023-11-15 PROCEDURE — 23430 REPAIR BICEPS TENDON: CPT | Performed by: PHYSICIAN ASSISTANT

## 2023-11-15 PROCEDURE — 2500000005 HC RX 250 GENERAL PHARMACY W/O HCPCS: Performed by: ORTHOPAEDIC SURGERY

## 2023-11-15 PROCEDURE — 7100000002 HC RECOVERY ROOM TIME - EACH INCREMENTAL 1 MINUTE: Performed by: ORTHOPAEDIC SURGERY

## 2023-11-15 PROCEDURE — 2500000004 HC RX 250 GENERAL PHARMACY W/ HCPCS (ALT 636 FOR OP/ED): Performed by: NURSE ANESTHETIST, CERTIFIED REGISTERED

## 2023-11-15 PROCEDURE — 2500000005 HC RX 250 GENERAL PHARMACY W/O HCPCS: Performed by: NURSE ANESTHETIST, CERTIFIED REGISTERED

## 2023-11-15 PROCEDURE — 3700000002 HC GENERAL ANESTHESIA TIME - EACH INCREMENTAL 1 MINUTE: Performed by: ORTHOPAEDIC SURGERY

## 2023-11-15 DEVICE — DYNANITE VIP GLENOID PIN, NITINOL, 2.8MM
Type: IMPLANTABLE DEVICE | Site: SHOULDER | Status: NON-FUNCTIONAL
Brand: ARTHREX®

## 2023-11-15 DEVICE — 5.5X16MM PERIPHERAL SCREW, LOCKING
Type: IMPLANTABLE DEVICE | Site: SHOULDER | Status: FUNCTIONAL
Brand: ARTHREX®

## 2023-11-15 DEVICE — UNIVERS REVERS HUMERAL STEM, 5MM
Type: IMPLANTABLE DEVICE | Site: SHOULDER | Status: FUNCTIONAL
Brand: ARTHREX®

## 2023-11-15 DEVICE — MODULAR POST, 20MM
Type: IMPLANTABLE DEVICE | Site: SHOULDER | Status: FUNCTIONAL
Brand: ARTHREX®

## 2023-11-15 DEVICE — 33/24 GLENOSPHERE
Type: IMPLANTABLE DEVICE | Site: SHOULDER | Status: FUNCTIONAL
Brand: ARTHREX®

## 2023-11-15 DEVICE — 24MM BASEPLATE, 10° FULL AUG, +2 LAT, ST
Type: IMPLANTABLE DEVICE | Site: SHOULDER | Status: FUNCTIONAL
Brand: ARTHREX®

## 2023-11-15 DEVICE — 5.5X24MM PERIPHERAL SCREW, LOCKING
Type: IMPLANTABLE DEVICE | Site: SHOULDER | Status: FUNCTIONAL
Brand: ARTHREX®

## 2023-11-15 RX ORDER — DOCUSATE SODIUM 50 MG/5ML
100 LIQUID ORAL 2 TIMES DAILY
Status: DISCONTINUED | OUTPATIENT
Start: 2023-11-15 | End: 2023-11-16 | Stop reason: HOSPADM

## 2023-11-15 RX ORDER — ALUMINUM HYDROXIDE, MAGNESIUM HYDROXIDE, AND SIMETHICONE 1200; 120; 1200 MG/30ML; MG/30ML; MG/30ML
20 SUSPENSION ORAL EVERY 4 HOURS PRN
Status: DISCONTINUED | OUTPATIENT
Start: 2023-11-15 | End: 2023-11-16 | Stop reason: HOSPADM

## 2023-11-15 RX ORDER — PANTOPRAZOLE SODIUM 40 MG/1
40 TABLET, DELAYED RELEASE ORAL DAILY
Status: DISCONTINUED | OUTPATIENT
Start: 2023-11-16 | End: 2023-11-16 | Stop reason: HOSPADM

## 2023-11-15 RX ORDER — ROCURONIUM BROMIDE 10 MG/ML
INJECTION, SOLUTION INTRAVENOUS AS NEEDED
Status: DISCONTINUED | OUTPATIENT
Start: 2023-11-15 | End: 2023-11-15

## 2023-11-15 RX ORDER — HYDRALAZINE HYDROCHLORIDE 20 MG/ML
10 INJECTION INTRAMUSCULAR; INTRAVENOUS EVERY 30 MIN PRN
Status: DISCONTINUED | OUTPATIENT
Start: 2023-11-15 | End: 2023-11-15 | Stop reason: HOSPADM

## 2023-11-15 RX ORDER — LISINOPRIL 10 MG/1
10 TABLET ORAL DAILY
Status: DISCONTINUED | OUTPATIENT
Start: 2023-11-16 | End: 2023-11-16 | Stop reason: HOSPADM

## 2023-11-15 RX ORDER — METOCLOPRAMIDE 10 MG/1
10 TABLET ORAL ONCE
Status: COMPLETED | OUTPATIENT
Start: 2023-11-15 | End: 2023-11-15

## 2023-11-15 RX ORDER — FENTANYL CITRATE 50 UG/ML
50 INJECTION, SOLUTION INTRAMUSCULAR; INTRAVENOUS ONCE
Status: COMPLETED | OUTPATIENT
Start: 2023-11-15 | End: 2023-11-15

## 2023-11-15 RX ORDER — GLYCOPYRROLATE 0.2 MG/ML
INJECTION INTRAMUSCULAR; INTRAVENOUS AS NEEDED
Status: DISCONTINUED | OUTPATIENT
Start: 2023-11-15 | End: 2023-11-15

## 2023-11-15 RX ORDER — TALC
3 POWDER (GRAM) TOPICAL NIGHTLY PRN
Status: DISCONTINUED | OUTPATIENT
Start: 2023-11-15 | End: 2023-11-16 | Stop reason: HOSPADM

## 2023-11-15 RX ORDER — ACETAMINOPHEN 325 MG/1
650 TABLET ORAL EVERY 4 HOURS PRN
Status: DISCONTINUED | OUTPATIENT
Start: 2023-11-15 | End: 2023-11-16 | Stop reason: HOSPADM

## 2023-11-15 RX ORDER — SIMVASTATIN 20 MG/1
20 TABLET, FILM COATED ORAL NIGHTLY
Status: DISCONTINUED | OUTPATIENT
Start: 2023-11-15 | End: 2023-11-15

## 2023-11-15 RX ORDER — DIPHENHYDRAMINE HYDROCHLORIDE 50 MG/ML
12.5 INJECTION INTRAMUSCULAR; INTRAVENOUS EVERY 6 HOURS PRN
Status: DISCONTINUED | OUTPATIENT
Start: 2023-11-15 | End: 2023-11-16 | Stop reason: HOSPADM

## 2023-11-15 RX ORDER — OXYCODONE HYDROCHLORIDE 5 MG/1
5 TABLET ORAL EVERY 6 HOURS PRN
Status: DISCONTINUED | OUTPATIENT
Start: 2023-11-15 | End: 2023-11-16 | Stop reason: HOSPADM

## 2023-11-15 RX ORDER — NALOXONE HYDROCHLORIDE 0.4 MG/ML
0.2 INJECTION, SOLUTION INTRAMUSCULAR; INTRAVENOUS; SUBCUTANEOUS EVERY 5 MIN PRN
Status: DISCONTINUED | OUTPATIENT
Start: 2023-11-15 | End: 2023-11-16 | Stop reason: HOSPADM

## 2023-11-15 RX ORDER — ATORVASTATIN CALCIUM 20 MG/1
10 TABLET, FILM COATED ORAL NIGHTLY
Status: DISCONTINUED | OUTPATIENT
Start: 2023-11-15 | End: 2023-11-16 | Stop reason: HOSPADM

## 2023-11-15 RX ORDER — SODIUM CHLORIDE, SODIUM LACTATE, POTASSIUM CHLORIDE, CALCIUM CHLORIDE 600; 310; 30; 20 MG/100ML; MG/100ML; MG/100ML; MG/100ML
100 INJECTION, SOLUTION INTRAVENOUS CONTINUOUS
Status: DISCONTINUED | OUTPATIENT
Start: 2023-11-15 | End: 2023-11-15

## 2023-11-15 RX ORDER — NEOSTIGMINE METHYLSULFATE 1 MG/ML
INJECTION, SOLUTION INTRAVENOUS AS NEEDED
Status: DISCONTINUED | OUTPATIENT
Start: 2023-11-15 | End: 2023-11-15

## 2023-11-15 RX ORDER — ONDANSETRON HYDROCHLORIDE 2 MG/ML
INJECTION, SOLUTION INTRAVENOUS AS NEEDED
Status: DISCONTINUED | OUTPATIENT
Start: 2023-11-15 | End: 2023-11-15

## 2023-11-15 RX ORDER — DIPHENHYDRAMINE HYDROCHLORIDE 50 MG/ML
12.5 INJECTION INTRAMUSCULAR; INTRAVENOUS ONCE AS NEEDED
Status: DISCONTINUED | OUTPATIENT
Start: 2023-11-15 | End: 2023-11-15 | Stop reason: HOSPADM

## 2023-11-15 RX ORDER — FAMOTIDINE 20 MG/1
20 TABLET, FILM COATED ORAL ONCE
Status: COMPLETED | OUTPATIENT
Start: 2023-11-15 | End: 2023-11-15

## 2023-11-15 RX ORDER — KETOROLAC TROMETHAMINE 15 MG/ML
15 INJECTION, SOLUTION INTRAMUSCULAR; INTRAVENOUS ONCE AS NEEDED
Status: COMPLETED | OUTPATIENT
Start: 2023-11-15 | End: 2023-11-15

## 2023-11-15 RX ORDER — ALBUTEROL SULFATE 0.83 MG/ML
2.5 SOLUTION RESPIRATORY (INHALATION) ONCE
Status: DISCONTINUED | OUTPATIENT
Start: 2023-11-15 | End: 2023-11-15 | Stop reason: HOSPADM

## 2023-11-15 RX ORDER — TRANEXAMIC ACID 100 MG/ML
INJECTION, SOLUTION INTRAVENOUS AS NEEDED
Status: DISCONTINUED | OUTPATIENT
Start: 2023-11-15 | End: 2023-11-15 | Stop reason: HOSPADM

## 2023-11-15 RX ORDER — GABAPENTIN 300 MG/1
600 CAPSULE ORAL 3 TIMES DAILY
Status: DISCONTINUED | OUTPATIENT
Start: 2023-11-15 | End: 2023-11-16 | Stop reason: HOSPADM

## 2023-11-15 RX ORDER — ACETAMINOPHEN 325 MG/1
650 TABLET ORAL EVERY 6 HOURS SCHEDULED
Status: DISCONTINUED | OUTPATIENT
Start: 2023-11-15 | End: 2023-11-16 | Stop reason: HOSPADM

## 2023-11-15 RX ORDER — OXYCODONE HCL 10 MG/1
10 TABLET, FILM COATED, EXTENDED RELEASE ORAL ONCE AS NEEDED
Status: CANCELLED | OUTPATIENT
Start: 2023-11-15

## 2023-11-15 RX ORDER — ALBUTEROL SULFATE 0.83 MG/ML
2.5 SOLUTION RESPIRATORY (INHALATION) EVERY 6 HOURS PRN
Status: DISCONTINUED | OUTPATIENT
Start: 2023-11-15 | End: 2023-11-16 | Stop reason: HOSPADM

## 2023-11-15 RX ORDER — PROPOFOL 10 MG/ML
INJECTION, EMULSION INTRAVENOUS AS NEEDED
Status: DISCONTINUED | OUTPATIENT
Start: 2023-11-15 | End: 2023-11-15

## 2023-11-15 RX ORDER — ALBUTEROL SULFATE 90 UG/1
2 AEROSOL, METERED RESPIRATORY (INHALATION) EVERY 6 HOURS PRN
Status: DISCONTINUED | OUTPATIENT
Start: 2023-11-15 | End: 2023-11-15 | Stop reason: CLARIF

## 2023-11-15 RX ORDER — BENZONATATE 100 MG/1
100 CAPSULE ORAL 3 TIMES DAILY PRN
Status: DISCONTINUED | OUTPATIENT
Start: 2023-11-15 | End: 2023-11-16 | Stop reason: HOSPADM

## 2023-11-15 RX ORDER — ONDANSETRON HYDROCHLORIDE 2 MG/ML
4 INJECTION, SOLUTION INTRAVENOUS EVERY 8 HOURS PRN
Status: DISCONTINUED | OUTPATIENT
Start: 2023-11-15 | End: 2023-11-16 | Stop reason: HOSPADM

## 2023-11-15 RX ORDER — MEPERIDINE HYDROCHLORIDE 25 MG/ML
12.5 INJECTION INTRAMUSCULAR; INTRAVENOUS; SUBCUTANEOUS EVERY 10 MIN PRN
Status: DISCONTINUED | OUTPATIENT
Start: 2023-11-15 | End: 2023-11-15 | Stop reason: HOSPADM

## 2023-11-15 RX ORDER — CEFAZOLIN SODIUM 2 G/100ML
2 INJECTION, SOLUTION INTRAVENOUS EVERY 8 HOURS
Status: COMPLETED | OUTPATIENT
Start: 2023-11-15 | End: 2023-11-16

## 2023-11-15 RX ORDER — MIDAZOLAM HYDROCHLORIDE 1 MG/ML
2 INJECTION, SOLUTION INTRAMUSCULAR; INTRAVENOUS ONCE
Status: COMPLETED | OUTPATIENT
Start: 2023-11-15 | End: 2023-11-15

## 2023-11-15 RX ORDER — ONDANSETRON 4 MG/1
4 TABLET, ORALLY DISINTEGRATING ORAL EVERY 8 HOURS PRN
Status: DISCONTINUED | OUTPATIENT
Start: 2023-11-15 | End: 2023-11-16 | Stop reason: HOSPADM

## 2023-11-15 RX ORDER — OXYCODONE HYDROCHLORIDE 5 MG/1
10 TABLET ORAL EVERY 4 HOURS PRN
Status: DISCONTINUED | OUTPATIENT
Start: 2023-11-15 | End: 2023-11-16 | Stop reason: HOSPADM

## 2023-11-15 RX ORDER — LORATADINE 10 MG/1
10 TABLET ORAL DAILY
Status: DISCONTINUED | OUTPATIENT
Start: 2023-11-16 | End: 2023-11-16 | Stop reason: HOSPADM

## 2023-11-15 RX ORDER — ASPIRIN 81 MG/1
81 TABLET ORAL DAILY
Status: DISCONTINUED | OUTPATIENT
Start: 2023-11-16 | End: 2023-11-16 | Stop reason: HOSPADM

## 2023-11-15 RX ORDER — CEFAZOLIN SODIUM 2 G/100ML
2 INJECTION, SOLUTION INTRAVENOUS ONCE
Status: COMPLETED | OUTPATIENT
Start: 2023-11-15 | End: 2023-11-15

## 2023-11-15 RX ORDER — POLYETHYLENE GLYCOL 3350 17 G/17G
17 POWDER, FOR SOLUTION ORAL DAILY
Status: DISCONTINUED | OUTPATIENT
Start: 2023-11-15 | End: 2023-11-16 | Stop reason: HOSPADM

## 2023-11-15 RX ORDER — ALBUTEROL SULFATE 0.83 MG/ML
2.5 SOLUTION RESPIRATORY (INHALATION) ONCE AS NEEDED
Status: DISCONTINUED | OUTPATIENT
Start: 2023-11-15 | End: 2023-11-15 | Stop reason: HOSPADM

## 2023-11-15 RX ORDER — SODIUM CHLORIDE, SODIUM LACTATE, POTASSIUM CHLORIDE, CALCIUM CHLORIDE 600; 310; 30; 20 MG/100ML; MG/100ML; MG/100ML; MG/100ML
100 INJECTION, SOLUTION INTRAVENOUS CONTINUOUS
Status: DISCONTINUED | OUTPATIENT
Start: 2023-11-15 | End: 2023-11-16 | Stop reason: HOSPADM

## 2023-11-15 RX ORDER — LABETALOL HYDROCHLORIDE 5 MG/ML
10 INJECTION, SOLUTION INTRAVENOUS ONCE AS NEEDED
Status: DISCONTINUED | OUTPATIENT
Start: 2023-11-15 | End: 2023-11-15 | Stop reason: HOSPADM

## 2023-11-15 RX ORDER — GLUCOSAM/CHONDRO/HERB 149/HYAL 750-100 MG
1 TABLET ORAL DAILY
Status: DISCONTINUED | OUTPATIENT
Start: 2023-11-15 | End: 2023-11-15 | Stop reason: RX

## 2023-11-15 RX ORDER — ONDANSETRON HYDROCHLORIDE 2 MG/ML
4 INJECTION, SOLUTION INTRAVENOUS ONCE AS NEEDED
Status: DISCONTINUED | OUTPATIENT
Start: 2023-11-15 | End: 2023-11-15 | Stop reason: HOSPADM

## 2023-11-15 RX ADMIN — PROPOFOL 200 MG: 10 INJECTION, EMULSION INTRAVENOUS at 12:20

## 2023-11-15 RX ADMIN — SODIUM CHLORIDE, SODIUM LACTATE, POTASSIUM CHLORIDE, AND CALCIUM CHLORIDE 100 ML/HR: 600; 310; 30; 20 INJECTION, SOLUTION INTRAVENOUS at 16:29

## 2023-11-15 RX ADMIN — OXYCODONE HYDROCHLORIDE 10 MG: 5 TABLET ORAL at 16:21

## 2023-11-15 RX ADMIN — Medication 2 L/MIN: at 18:49

## 2023-11-15 RX ADMIN — GABAPENTIN 600 MG: 300 CAPSULE ORAL at 20:48

## 2023-11-15 RX ADMIN — FENTANYL CITRATE 50 MCG: 50 INJECTION INTRAMUSCULAR; INTRAVENOUS at 11:59

## 2023-11-15 RX ADMIN — ONDANSETRON 4 MG: 2 INJECTION INTRAMUSCULAR; INTRAVENOUS at 13:36

## 2023-11-15 RX ADMIN — CEFAZOLIN SODIUM 2 G: 2 INJECTION, SOLUTION INTRAVENOUS at 20:47

## 2023-11-15 RX ADMIN — MIDAZOLAM 2 MG: 1 INJECTION INTRAMUSCULAR; INTRAVENOUS at 11:59

## 2023-11-15 RX ADMIN — GLYCOPYRROLATE 0.6 MG: 0.2 INJECTION INTRAMUSCULAR; INTRAVENOUS at 13:36

## 2023-11-15 RX ADMIN — FAMOTIDINE 20 MG: 20 TABLET, FILM COATED ORAL at 09:44

## 2023-11-15 RX ADMIN — KETOROLAC TROMETHAMINE 15 MG: 15 INJECTION, SOLUTION INTRAMUSCULAR; INTRAVENOUS at 14:40

## 2023-11-15 RX ADMIN — CEFAZOLIN SODIUM 2 G: 2 INJECTION, SOLUTION INTRAVENOUS at 12:15

## 2023-11-15 RX ADMIN — ROCURONIUM BROMIDE 50 MG: 10 INJECTION, SOLUTION INTRAVENOUS at 12:21

## 2023-11-15 RX ADMIN — ATORVASTATIN CALCIUM 10 MG: 20 TABLET, FILM COATED ORAL at 20:48

## 2023-11-15 RX ADMIN — ACETAMINOPHEN 650 MG: 325 TABLET ORAL at 17:40

## 2023-11-15 RX ADMIN — ONDANSETRON 4 MG: 4 TABLET, ORALLY DISINTEGRATING ORAL at 20:54

## 2023-11-15 RX ADMIN — DOCUSATE SODIUM 100 MG: 50 LIQUID ORAL at 20:48

## 2023-11-15 RX ADMIN — GABAPENTIN 600 MG: 300 CAPSULE ORAL at 17:39

## 2023-11-15 RX ADMIN — NEOSTIGMINE METHYLSULFATE 3 MG: 1 INJECTION INTRAVENOUS at 13:36

## 2023-11-15 RX ADMIN — METOCLOPRAMIDE 10 MG: 10 TABLET ORAL at 09:44

## 2023-11-15 RX ADMIN — SODIUM CHLORIDE, SODIUM LACTATE, POTASSIUM CHLORIDE, AND CALCIUM CHLORIDE 100 ML/HR: 600; 310; 30; 20 INJECTION, SOLUTION INTRAVENOUS at 09:52

## 2023-11-15 SDOH — HEALTH STABILITY: MENTAL HEALTH: HOW MANY STANDARD DRINKS CONTAINING ALCOHOL DO YOU HAVE ON A TYPICAL DAY?: 1 OR 2

## 2023-11-15 SDOH — SOCIAL STABILITY: SOCIAL INSECURITY: WITHIN THE LAST YEAR, HAVE YOU BEEN AFRAID OF YOUR PARTNER OR EX-PARTNER?: NO

## 2023-11-15 SDOH — SOCIAL STABILITY: SOCIAL INSECURITY: WITHIN THE LAST YEAR, HAVE YOU BEEN HUMILIATED OR EMOTIONALLY ABUSED IN OTHER WAYS BY YOUR PARTNER OR EX-PARTNER?: NO

## 2023-11-15 SDOH — HEALTH STABILITY: MENTAL HEALTH: HOW OFTEN DO YOU HAVE 6 OR MORE DRINKS ON ONE OCCASION?: NEVER

## 2023-11-15 SDOH — SOCIAL STABILITY: SOCIAL INSECURITY: WERE YOU ABLE TO COMPLETE ALL THE BEHAVIORAL HEALTH SCREENINGS?: YES

## 2023-11-15 SDOH — SOCIAL STABILITY: SOCIAL INSECURITY: ABUSE: ADULT

## 2023-11-15 SDOH — HEALTH STABILITY: MENTAL HEALTH
STRESS IS WHEN SOMEONE FEELS TENSE, NERVOUS, ANXIOUS, OR CAN'T SLEEP AT NIGHT BECAUSE THEIR MIND IS TROUBLED. HOW STRESSED ARE YOU?: NOT AT ALL

## 2023-11-15 SDOH — SOCIAL STABILITY: SOCIAL INSECURITY: ARE YOU OR HAVE YOU BEEN THREATENED OR ABUSED PHYSICALLY, EMOTIONALLY, OR SEXUALLY BY ANYONE?: NO

## 2023-11-15 SDOH — SOCIAL STABILITY: SOCIAL INSECURITY: DO YOU FEEL ANYONE HAS EXPLOITED OR TAKEN ADVANTAGE OF YOU FINANCIALLY OR OF YOUR PERSONAL PROPERTY?: NO

## 2023-11-15 SDOH — ECONOMIC STABILITY: INCOME INSECURITY: IN THE LAST 12 MONTHS, WAS THERE A TIME WHEN YOU WERE NOT ABLE TO PAY THE MORTGAGE OR RENT ON TIME?: NO

## 2023-11-15 SDOH — SOCIAL STABILITY: SOCIAL NETWORK: ARE YOU MARRIED, WIDOWED, DIVORCED, SEPARATED, NEVER MARRIED, OR LIVING WITH A PARTNER?: MARRIED

## 2023-11-15 SDOH — SOCIAL STABILITY: SOCIAL INSECURITY
WITHIN THE LAST YEAR, HAVE YOU BEEN KICKED, HIT, SLAPPED, OR OTHERWISE PHYSICALLY HURT BY YOUR PARTNER OR EX-PARTNER?: NO

## 2023-11-15 SDOH — ECONOMIC STABILITY: HOUSING INSECURITY
IN THE LAST 12 MONTHS, WAS THERE A TIME WHEN YOU DID NOT HAVE A STEADY PLACE TO SLEEP OR SLEPT IN A SHELTER (INCLUDING NOW)?: NO

## 2023-11-15 SDOH — SOCIAL STABILITY: SOCIAL INSECURITY
WITHIN THE LAST YEAR, HAVE TO BEEN RAPED OR FORCED TO HAVE ANY KIND OF SEXUAL ACTIVITY BY YOUR PARTNER OR EX-PARTNER?: NO

## 2023-11-15 SDOH — HEALTH STABILITY: PHYSICAL HEALTH: ON AVERAGE, HOW MANY MINUTES DO YOU ENGAGE IN EXERCISE AT THIS LEVEL?: 0 MIN

## 2023-11-15 SDOH — SOCIAL STABILITY: SOCIAL INSECURITY: DOES ANYONE TRY TO KEEP YOU FROM HAVING/CONTACTING OTHER FRIENDS OR DOING THINGS OUTSIDE YOUR HOME?: NO

## 2023-11-15 SDOH — ECONOMIC STABILITY: FOOD INSECURITY: WITHIN THE PAST 12 MONTHS, THE FOOD YOU BOUGHT JUST DIDN'T LAST AND YOU DIDN'T HAVE MONEY TO GET MORE.: NEVER TRUE

## 2023-11-15 SDOH — ECONOMIC STABILITY: INCOME INSECURITY: IN THE PAST 12 MONTHS, HAS THE ELECTRIC, GAS, OIL, OR WATER COMPANY THREATENED TO SHUT OFF SERVICE IN YOUR HOME?: NO

## 2023-11-15 SDOH — SOCIAL STABILITY: SOCIAL NETWORK: HOW OFTEN DO YOU GET TOGETHER WITH FRIENDS OR RELATIVES?: MORE THAN THREE TIMES A WEEK

## 2023-11-15 SDOH — SOCIAL STABILITY: SOCIAL INSECURITY: DO YOU FEEL UNSAFE GOING BACK TO THE PLACE WHERE YOU ARE LIVING?: NO

## 2023-11-15 SDOH — ECONOMIC STABILITY: HOUSING INSECURITY: IN THE LAST 12 MONTHS, HOW MANY PLACES HAVE YOU LIVED?: 1

## 2023-11-15 SDOH — ECONOMIC STABILITY: FOOD INSECURITY: WITHIN THE PAST 12 MONTHS, YOU WORRIED THAT YOUR FOOD WOULD RUN OUT BEFORE YOU GOT MONEY TO BUY MORE.: NEVER TRUE

## 2023-11-15 SDOH — SOCIAL STABILITY: SOCIAL NETWORK: HOW OFTEN DO YOU ATTENT MEETINGS OF THE CLUB OR ORGANIZATION YOU BELONG TO?: NEVER

## 2023-11-15 SDOH — SOCIAL STABILITY: SOCIAL NETWORK
IN A TYPICAL WEEK, HOW MANY TIMES DO YOU TALK ON THE PHONE WITH FAMILY, FRIENDS, OR NEIGHBORS?: MORE THAN THREE TIMES A WEEK

## 2023-11-15 SDOH — HEALTH STABILITY: MENTAL HEALTH: HOW OFTEN DO YOU HAVE A DRINK CONTAINING ALCOHOL?: 4 OR MORE TIMES A WEEK

## 2023-11-15 SDOH — ECONOMIC STABILITY: TRANSPORTATION INSECURITY
IN THE PAST 12 MONTHS, HAS THE LACK OF TRANSPORTATION KEPT YOU FROM MEDICAL APPOINTMENTS OR FROM GETTING MEDICATIONS?: NO

## 2023-11-15 SDOH — HEALTH STABILITY: PHYSICAL HEALTH: ON AVERAGE, HOW MANY DAYS PER WEEK DO YOU ENGAGE IN MODERATE TO STRENUOUS EXERCISE (LIKE A BRISK WALK)?: 0 DAYS

## 2023-11-15 SDOH — ECONOMIC STABILITY: TRANSPORTATION INSECURITY
IN THE PAST 12 MONTHS, HAS LACK OF TRANSPORTATION KEPT YOU FROM MEETINGS, WORK, OR FROM GETTING THINGS NEEDED FOR DAILY LIVING?: NO

## 2023-11-15 SDOH — SOCIAL STABILITY: SOCIAL INSECURITY: HAS ANYONE EVER THREATENED TO HURT YOUR FAMILY OR YOUR PETS?: NO

## 2023-11-15 SDOH — SOCIAL STABILITY: SOCIAL NETWORK
DO YOU BELONG TO ANY CLUBS OR ORGANIZATIONS SUCH AS CHURCH GROUPS UNIONS, FRATERNAL OR ATHLETIC GROUPS, OR SCHOOL GROUPS?: NO

## 2023-11-15 SDOH — SOCIAL STABILITY: SOCIAL INSECURITY: HAVE YOU HAD THOUGHTS OF HARMING ANYONE ELSE?: NO

## 2023-11-15 SDOH — SOCIAL STABILITY: SOCIAL NETWORK: HOW OFTEN DO YOU ATTEND CHURCH OR RELIGIOUS SERVICES?: NEVER

## 2023-11-15 SDOH — ECONOMIC STABILITY: INCOME INSECURITY: HOW HARD IS IT FOR YOU TO PAY FOR THE VERY BASICS LIKE FOOD, HOUSING, MEDICAL CARE, AND HEATING?: NOT HARD AT ALL

## 2023-11-15 SDOH — SOCIAL STABILITY: SOCIAL INSECURITY: ARE THERE ANY APPARENT SIGNS OF INJURIES/BEHAVIORS THAT COULD BE RELATED TO ABUSE/NEGLECT?: NO

## 2023-11-15 ASSESSMENT — PAIN SCALES - GENERAL
PAINLEVEL_OUTOF10: 0 - NO PAIN
PAINLEVEL_OUTOF10: 7
PAINLEVEL_OUTOF10: 2
PAINLEVEL_OUTOF10: 9
PAINLEVEL_OUTOF10: 0 - NO PAIN
PAINLEVEL_OUTOF10: 0 - NO PAIN
PAINLEVEL_OUTOF10: 7
PAINLEVEL_OUTOF10: 0 - NO PAIN

## 2023-11-15 ASSESSMENT — COGNITIVE AND FUNCTIONAL STATUS - GENERAL
HELP NEEDED FOR BATHING: A LITTLE
HELP NEEDED FOR BATHING: A LITTLE
PATIENT BASELINE BEDBOUND: YES
WALKING IN HOSPITAL ROOM: A LITTLE
TOILETING: A LITTLE
CLIMB 3 TO 5 STEPS WITH RAILING: A LITTLE
STANDING UP FROM CHAIR USING ARMS: A LITTLE
CLIMB 3 TO 5 STEPS WITH RAILING: A LITTLE
MOBILITY SCORE: 24
MOVING TO AND FROM BED TO CHAIR: A LITTLE
DRESSING REGULAR UPPER BODY CLOTHING: A LITTLE
TOILETING: A LITTLE
EATING MEALS: A LITTLE
TURNING FROM BACK TO SIDE WHILE IN FLAT BAD: A LITTLE
MOVING TO AND FROM BED TO CHAIR: A LITTLE
MOVING FROM LYING ON BACK TO SITTING ON SIDE OF FLAT BED WITH BEDRAILS: A LITTLE
DAILY ACTIVITIY SCORE: 18
DAILY ACTIVITIY SCORE: 19
MOBILITY SCORE: 18
DAILY ACTIVITIY SCORE: 24
DRESSING REGULAR LOWER BODY CLOTHING: A LITTLE
PERSONAL GROOMING: A LITTLE
MOBILITY SCORE: 20
DRESSING REGULAR LOWER BODY CLOTHING: A LITTLE
DRESSING REGULAR UPPER BODY CLOTHING: A LITTLE
PERSONAL GROOMING: A LITTLE
WALKING IN HOSPITAL ROOM: A LITTLE
STANDING UP FROM CHAIR USING ARMS: A LITTLE

## 2023-11-15 ASSESSMENT — LIFESTYLE VARIABLES
AUDIT TOTAL SCORE: 5
PRESCIPTION_ABUSE_PAST_12_MONTHS: NO
HOW OFTEN DURING THE LAST YEAR HAVE YOU FAILED TO DO WHAT WAS NORMALLY EXPECTED FROM YOU BECAUSE OF DRINKING: NEVER
HOW MANY STANDARD DRINKS CONTAINING ALCOHOL DO YOU HAVE ON A TYPICAL DAY: 1 OR 2
HOW OFTEN DURING THE LAST YEAR HAVE YOU HAD A FEELING OF GUILT OR REMORSE AFTER DRINKING: NEVER
AUDIT-C TOTAL SCORE: 4
HOW OFTEN DURING THE LAST YEAR HAVE YOU FOUND THAT YOU WERE NOT ABLE TO STOP DRINKING ONCE YOU HAD STARTED: NEVER
HOW OFTEN DO YOU HAVE A DRINK CONTAINING ALCOHOL: 4 OR MORE TIMES A WEEK
SKIP TO QUESTIONS 9-10: 1
SKIP TO QUESTIONS 9-10: 0
HAS A RELATIVE, FRIEND, DOCTOR, OR ANOTHER HEALTH PROFESSIONAL EXPRESSED CONCERN ABOUT YOUR DRINKING OR SUGGESTED YOU CUT DOWN: NO
HOW OFTEN DURING THE LAST YEAR HAVE YOU NEEDED AN ALCOHOLIC DRINK FIRST THING IN THE MORNING TO GET YOURSELF GOING AFTER A NIGHT OF HEAVY DRINKING: NEVER
AUDIT-C TOTAL SCORE: 5
AUDIT-C TOTAL SCORE: 5
AUDIT TOTAL SCORE: 0
HOW OFTEN DO YOU HAVE 6 OR MORE DRINKS ON ONE OCCASION: LESS THAN MONTHLY
HAVE YOU OR SOMEONE ELSE BEEN INJURED AS A RESULT OF YOUR DRINKING: NO
SUBSTANCE_ABUSE_PAST_12_MONTHS: NO
HOW OFTEN DURING THE LAST YEAR HAVE YOU BEEN UNABLE TO REMEMBER WHAT HAPPENED THE NIGHT BEFORE BECAUSE YOU HAD BEEN DRINKING: NEVER

## 2023-11-15 ASSESSMENT — ACTIVITIES OF DAILY LIVING (ADL)
JUDGMENT_ADEQUATE_SAFELY_COMPLETE_DAILY_ACTIVITIES: YES
LACK_OF_TRANSPORTATION: NO
BATHING: INDEPENDENT
WALKS IN HOME: INDEPENDENT
ADEQUATE_TO_COMPLETE_ADL: YES
DRESSING YOURSELF: INDEPENDENT
PATIENT'S MEMORY ADEQUATE TO SAFELY COMPLETE DAILY ACTIVITIES?: YES
GROOMING: INDEPENDENT
FEEDING YOURSELF: INDEPENDENT
HEARING - LEFT EAR: FUNCTIONAL
HEARING - RIGHT EAR: FUNCTIONAL
TOILETING: INDEPENDENT

## 2023-11-15 ASSESSMENT — PAIN - FUNCTIONAL ASSESSMENT
PAIN_FUNCTIONAL_ASSESSMENT: 0-10
PAIN_FUNCTIONAL_ASSESSMENT: WONG-BAKER FACES
PAIN_FUNCTIONAL_ASSESSMENT: 0-10
PAIN_FUNCTIONAL_ASSESSMENT: WONG-BAKER FACES
PAIN_FUNCTIONAL_ASSESSMENT: 0-10

## 2023-11-15 ASSESSMENT — COLUMBIA-SUICIDE SEVERITY RATING SCALE - C-SSRS
1. IN THE PAST MONTH, HAVE YOU WISHED YOU WERE DEAD OR WISHED YOU COULD GO TO SLEEP AND NOT WAKE UP?: NO
2. HAVE YOU ACTUALLY HAD ANY THOUGHTS OF KILLING YOURSELF?: NO
6. HAVE YOU EVER DONE ANYTHING, STARTED TO DO ANYTHING, OR PREPARED TO DO ANYTHING TO END YOUR LIFE?: NO

## 2023-11-15 ASSESSMENT — PAIN DESCRIPTION - DESCRIPTORS: DESCRIPTORS: ACHING

## 2023-11-15 ASSESSMENT — PAIN DESCRIPTION - ORIENTATION: ORIENTATION: RIGHT

## 2023-11-15 ASSESSMENT — PAIN DESCRIPTION - LOCATION: LOCATION: SHOULDER

## 2023-11-15 ASSESSMENT — PATIENT HEALTH QUESTIONNAIRE - PHQ9
1. LITTLE INTEREST OR PLEASURE IN DOING THINGS: NOT AT ALL
SUM OF ALL RESPONSES TO PHQ9 QUESTIONS 1 & 2: 0
2. FEELING DOWN, DEPRESSED OR HOPELESS: NOT AT ALL

## 2023-11-15 NOTE — ANESTHESIA PROCEDURE NOTES
Airway  Date/Time: 11/15/2023 12:22 PM  Urgency: elective    Airway not difficult    Staffing  Performed: CRNA   Authorized by: Ricky Pascal DO    Performed by: YRIS Drummond-RADHA  Patient location during procedure: OR    Indications and Patient Condition  Indications for airway management: anesthesia and airway protection  Spontaneous Ventilation: absent  Sedation level: deep  Preoxygenated: yes  Patient position: sniffing  MILS maintained throughout  Mask difficulty assessment: 1 - vent by mask  No planned trial extubation    Final Airway Details  Final airway type: endotracheal airway      Successful airway: ETT  Cuffed: yes   Successful intubation technique: direct laryngoscopy  Facilitating devices/methods: intubating stylet  Endotracheal tube insertion site: oral  Blade: Verna  Blade size: #3  ETT size (mm): 7.0  Placement verified by: chest auscultation and capnometry   Cuff volume (mL): 5  Measured from: lips  ETT to lips (cm): 21  Number of attempts at approach: 1  Ventilation between attempts: none  Number of other approaches attempted: 0    Additional Comments  Atraumatic intubation, dentition unchanged.

## 2023-11-15 NOTE — NURSING NOTE
Admitted to 203 following right shoulder surgery. Slightly sleepy. Complaining of pain in left shoulder.Family at bedside.

## 2023-11-15 NOTE — PERIOPERATIVE NURSING NOTE
"Pt remains drowsy but easily arousable. Pt answers questions appropriately. Pt states \"doesn't know if she's nauseous\"  Post op expectations explained.  "

## 2023-11-15 NOTE — ANESTHESIA PROCEDURE NOTES
Peripheral Block    Patient location during procedure: pre-op  Start time: 11/15/2023 11:58 AM  End time: 11/15/2023 12:02 PM  Reason for block: at surgeon's request  Staffing  Performed: attending and CRNA   Authorized by: Ricky Pascal DO    Performed by: Ricky Pascal DO  Preanesthetic Checklist  Completed: patient identified, IV checked, site marked, risks and benefits discussed, surgical consent, monitors and equipment checked, pre-op evaluation and timeout performed   Timeout performed at: 11/15/2023 11:55 AM  Peripheral Block  Patient position: laying flat  Prep: ChloraPrep  Patient monitoring: heart rate, cardiac monitor and continuous pulse ox  Block type: brachial plexus  Laterality: right  Injection technique: single-shot  Guidance: nerve stimulator and ultrasound guided  Local infiltration: bupivicaine  Infiltration strength: 0.5 %  Dose: 20 mL  Needle  Needle type: short-bevel   Needle gauge: 22 G  Needle length: 10 cm  Needle localization: nerve stimulator and ultrasound guidance  Assessment  Injection assessment: negative aspiration for heme, no paresthesia on injection, incremental injection and local visualized surrounding nerve on ultrasound  Paresthesia pain: none  Heart rate change: no  Slow fractionated injection: yes

## 2023-11-15 NOTE — NURSING NOTE
Admission vital signs obtained.  Pt. Oriented to the room.  Call light within reach  Bed alarm on.  Scd's on.  Encouraged pt. To order food.

## 2023-11-15 NOTE — CARE PLAN
RN TCC met with patient the patient at the bedside to complete assessment. (See EPIC)  73 yr old female admitted under observation following right total shoulder replacement with Dr. Bear.  Pt lives home with her spouse and was independent with self care prior to surgery.  She has no medical equipment in the home.  Denies smoking or drug use, but states she drinks wine daily.   PCP is Dr. Lui Lock. Pharmacy of choice is [facility] in [place].  TCC anticipates home tomorrow with no skilled needs. Confirmed post-op follow up is Tues Nov. 28, 2023 at 11:30 am-[facility]. Spouse to transport home.  MO letter discussed and signed. Copy sent to registration.

## 2023-11-15 NOTE — CONSULTS
Consults    Reason For Consult  Patient's status post right total shoulder replacement consulted for medical management postoperatively for pain COPD history of coronary artery disease and GERD    History Of Present Illness  Edda Webb is a 73 y.o. female presenting with  right total shoulder replacement     Past Medical History  She has a past medical history of Abnormal finding of blood chemistry, unspecified (08/08/2016), Abnormal weight gain (03/14/2014), Asthma, Delayed emergence from general anesthesia, Encounter for immunization (08/08/2016), Hyperlipidemia, Hyperlipidemia, Hypertension, Hypertension, Neuropathy, Other abnormal glucose (08/08/2016), Other conditions influencing health status, Pain in right shoulder (02/02/2016), Pain in right shoulder (02/02/2016), Peripheral neuropathy, Personal history of diseases of the skin and subcutaneous tissue (08/08/2016), Personal history of other diseases of the nervous system and sense organs (02/05/2016), Personal history of other specified conditions (08/08/2016), Pneumonia, and Primary stabbing headache (08/08/2016).    Surgical History  She has a past surgical history that includes Hysterectomy (02/02/2016); pacemaker placement (Left); Laparoscopic Nissen fundoplication; Wrist fracture surgery (Left); and Appendectomy.     Social History  She reports that she has quit smoking. Her smoking use included cigarettes. She has a 43.00 pack-year smoking history. She has never used smokeless tobacco. She reports current alcohol use of about 17.0 standard drinks of alcohol per week. She reports that she does not use drugs.    Family History  Family History   Problem Relation Name Age of Onset    Heart disease Mother      Hypertension Mother      Other (cardiac disorder) Mother      Diabetes Mother      Lung cancer Father      Hypertension Sister      Diabetes Sister      Cancer Sister      Cancer Brother      Breast cancer Sibling         "  Allergies  Codeine    Review of Systems    noncontributory  Physical Exam   patient is alert in no acute distress   lungs are clear to auscultation and percussion   cardiac is regular rate and rhythm normal S1-S2 without murmur gallop rub click S3 or S4   abdomen is soft nontender positive bowel sounds there is no hepatosplenomegaly or CVA tenderness appreciated   extremities without cyanosis clubbing erythema or edema   right arm in sling extremity warm pulses intact incision site clean and dry patient able to move finger  Last Recorded Vitals  Blood pressure 171/85, pulse 75, temperature 36 °C (96.8 °F), temperature source Temporal, resp. rate 16, height 1.753 m (5' 9\"), weight 84.2 kg (185 lb 10 oz), SpO2 95 %.    Relevant Results    Current Facility-Administered Medications:     acetaminophen (Tylenol) tablet 650 mg, 650 mg, oral, q6h DAVID, Debi Stover PA-C    acetaminophen (Tylenol) tablet 650 mg, 650 mg, oral, q4h PRN, Debi Stover PA-C    albuterol 2.5 mg /3 mL (0.083 %) nebulizer solution 2.5 mg, 2.5 mg, nebulization, q6h PRN, Rome Bear MD    ceFAZolin in dextrose (iso-os) (Ancef) IVPB 2 g, 2 g, intravenous, q8h, Debi Stover PA-C    diphenhydrAMINE (BENADryl) injection 12.5 mg, 12.5 mg, intravenous, q6h PRN, Debi Stover PA-C    docusate sodium (Colace) oral liquid 100 mg, 100 mg, oral, BID, Debi Stover PA-C    HYDROmorphone (Dilaudid) injection 0.5 mg, 0.5 mg, intravenous, q2h PRN, Debi Stover PA-C    lactated Ringer's infusion, 100 mL/hr, intravenous, Continuous, Debi Stover PA-C, Last Rate: 100 mL/hr at 11/15/23 1629, 100 mL/hr at 11/15/23 1629    naloxone (Narcan) injection 0.2 mg, 0.2 mg, intravenous, q5 min PRN, Debi Stover PA-C    ondansetron ODT (Zofran-ODT) disintegrating tablet 4 mg, 4 mg, oral, q8h PRN **OR** ondansetron (Zofran) injection 4 mg, 4 mg, intravenous, q8h PRN, Debi Stover PA-C    oxyCODONE (Roxicodone) immediate " release tablet 10 mg, 10 mg, oral, q4h PRN, Debi Stover PA-C, 10 mg at 11/15/23 1621    oxyCODONE (Roxicodone) immediate release tablet 5 mg, 5 mg, oral, q6h PRN, Debi Stover PA-C    oxygen (O2) therapy, 2 L/min, inhalation, Continuous PRN - O2/gases, Rome Bear MD    polyethylene glycol (Glycolax, Miralax) packet 17 g, 17 g, oral, Daily, Debi Stover PA-C     Assessment/Plan    status post right total shoulder replacement   management per Ortho   PT   pain control   supportive care     history of COPD   respiratory consult   IS     history of CAD    has pacemaker secondary to heart block   Stable      DVT prophylaxis   SCD   ambulate      I spent 40 minutes in the professional and overall care of this patient.

## 2023-11-15 NOTE — ANESTHESIA PREPROCEDURE EVALUATION
Patient: Edda Webb    Procedure Information       Date/Time: 11/15/23 1100    Procedure: Arthroplasty Reverse Shoulder (Right: Arm Upper)    Location: [facility] / [facility]    Surgeons: Rome Bear MD            Relevant Problems   Cardiovascular   (+) Benign essential hypertension   (+) Chest pain   (+) Elevated cholesterol   (+) Mixed hyperlipidemia      GI   (+) GERD without esophagitis      Neuro/Psych   (+) Notalgia paresthetica   (+) Peripheral neuropathy   (+) Right carpal tunnel syndrome      Pulmonary   (+) COPD (chronic obstructive pulmonary disease) (CMS/HCC)   (+) Pneumonia      Musculoskeletal   (+) Degeneration of lumbar intervertebral disc   (+) Osteoarthritis of right shoulder   (+) Right carpal tunnel syndrome      Infectious Disease   (+) Herpes gingivostomatitis     Past Medical History:   Diagnosis Date    Abnormal finding of blood chemistry, unspecified 08/08/2016    Abnormal blood chemistry    Abnormal weight gain 03/14/2014    Weight gain    Asthma     Delayed emergence from general anesthesia     Encounter for immunization 08/08/2016    Need for shingles vaccine    Hyperlipidemia     Hyperlipidemia     Hypertension     Hypertension     Neuropathy     Other abnormal glucose 08/08/2016    Other abnormal glucose    Other conditions influencing health status     No significant past medical history    Pain in right shoulder 02/02/2016    Right shoulder pain    Pain in right shoulder 02/02/2016    Right shoulder pain    Peripheral neuropathy     Personal history of diseases of the skin and subcutaneous tissue 08/08/2016    History of solar lentigo    Personal history of other diseases of the nervous system and sense organs 02/05/2016    History of polyneuropathy    Personal history of other specified conditions 08/08/2016    History of shortness of breath    Pneumonia     8/2023    Primary stabbing headache 08/08/2016    Primary stabbing headache     Past Surgical History:    Procedure Laterality Date    APPENDECTOMY      HYSTERECTOMY  02/02/2016    Hysterectomy    LAPAROSCOPIC NISSEN FUNDOPLICATION      PACEMAKER PLACEMENT Left     WRIST FRACTURE SURGERY Left     with hardware       Clinical information reviewed:   Tobacco  Allergies  Meds   Med Hx  Surg Hx   Fam Hx  Soc Hx        NPO Detail:  NPO/Void Status  Date of Last Liquid: 11/14/23  Time of Last Liquid: 2000  Date of Last Solid: 11/14/23  Time of Last Solid: 1930  Time of Last Void: 0926         Physical Exam    Airway  Mallampati: II     Cardiovascular   Rhythm: regular  Rate: normal     Dental - normal exam     Pulmonary   Breath sounds clear to auscultation     Abdominal   Abdomen: soft             Anesthesia Plan    ASA 3     general     intravenous induction   Postoperative administration of opioids is intended.  Anesthetic plan and risks discussed with patient.    Plan discussed with CRNA, attending and CAA.

## 2023-11-15 NOTE — OP NOTE
Arthroplasty Reverse Shoulder (R) Operative Note     Date: 11/15/2023  OR Location: [facility]    Name: Edda Webb, : 1950, Age: 73 y.o., MRN: [id number], Sex: female    Diagnosis  Pre-op Diagnosis     * Tear of right rotator cuff, unspecified tear extent, unspecified whether traumatic [M75.101]     * Osteoarthritis of right shoulder, unspecified osteoarthritis type [M19.011] Post-op Diagnosis     * Tear of right rotator cuff, unspecified tear extent, unspecified whether traumatic [M75.101]     * Osteoarthritis of right shoulder, unspecified osteoarthritis type [M19.011]     Procedures  Arthroplasty Reverse Shoulder  01[address] - [place] ARTHROPLASTY GLENOHUMERAL JOINT TOTAL SHOULDER    Right shoulder Arthrex total reverse replacement  Surgeons      * Rome Bear - Primary    Resident/Fellow/Other Assistant:  Surgeon(s) and Role:  Debi Stover PA-C  Procedure Summary  Anesthesia: General block ASA: III  Anesthesia Staff: Anesthesiologist: Ricky Pascal DO  CRNA: YRIS Drummond-CRNA  Estimated Blood Loss: 65mL  Intra-op Medications:   Medication Name Total Dose   lactated Ringer's infusion 316.67 mL   ceFAZolin in dextrose (iso-os) (Ancef) IVPB 2 g 2 g   fentaNYL PF (Sublimaze) injection 50 mcg 50 mcg   midazolam (Versed) injection 2 mg 2 mg              Anesthesia Record               Intraprocedure I/O Totals          Intake    Propofol Drip 0.00 mL    The total shown is the total volume documented since Anesthesia Start was filed.    Total Intake 0 mL          Specimen: No specimens collected     Staff:   Circulator: Kendra Fiore RN  Scrub Person: Yenny Mata PA-C; Debi Winter         Drains and/or Catheters: * None in log *    Tourniquet Times:         Implants:  Implants       Type Name Action Serial No.      Joint GLENOID PIN, TARGETER, 2.8MM, STAINLESS - [id number] Used, Not Implanted       24MM ARTHREX BASEPLATE, 10-DEGREE FULL AUG, +2 LAT, ST Implanted      Implant POST,  MODULAR, 20MM - [id number] Implanted      Screw SCREW, NON-LOCKING, 4.5MM X 24MM - [id number] Wasted      Joint STEM, HUMERAL, UNIVERS REVERS, 5MM - [id number] Implanted       33MM ARTHREX SUTURE CUP Implanted      Joint GLENOSPHERE, 33/24MM BASEPLATE TAPER - [id number] Implanted      Screw SCREW, LOCKING, 5.5MM X 24MM - [id number] Implanted      Screw SCREW, LOCKING, 5.5MM X 16MM - [id number] Implanted      Screw SCREW, LOCKING, 5.5MM X 16MM - [id number] Implanted       univers revers modular glenoid system, humeral insert 33 +6, constrained Implanted      Screw SCREW, NON-LOCKING, 4.5MM X 24MM - [id number] Wasted               Findings: Massive rotator cuff tear with early arthritic change but significant superior inclination    Indications: Edda Webb is an 73 y.o. female who is having surgery for Tear of right rotator cuff, unspecified tear extent, unspecified whether traumatic [M75.101]  Osteoarthritis of right shoulder, unspecified osteoarthritis type [M19.011].  Has not patient comes in today for reverse replacement with poor shoulder function chronic pain with a significant rotator cuff tear at an advanced age.  We talked about repair versus reverse replacement the patient chose reverse replacement after considering risk benefits alternatives such as nerve artery tendon damage infection continued pain and significant risk of hardware failure possible hardware removal conversion to hemiarthroplasty and other risk factors such as nerve artery tendon damage infection continued pain dislocation and understands that there is variable results in terms of range of motion and pain relief and also there is some expected loss of internal rotation.  Informed consent obtained patient was proceed    The patient was seen in the preoperative area. The risks, benefits, complications, treatment options, non-operative alternatives, expected recovery and outcomes were discussed with the patient. The possibilities of reaction to  medication, pulmonary aspiration, injury to surrounding structures, bleeding, recurrent infection, the need for additional procedures, failure to diagnose a condition, and creating a complication requiring transfusion or operation were discussed with the patient. The patient concurred with the proposed plan, giving informed consent.  The site of surgery was properly noted/marked if necessary per policy. The patient has been actively warmed in preoperative area. Preoperative antibiotics have been ordered and given within 1 hours of incision. Venous thrombosis prophylaxis have been ordered including bilateral sequential compression devices    Procedure Details: Pleasant patient brought the operating room after sterile prep and drape informed timeout we did a standard deltopectoral approach a small linear tear of a portion of the cephalic vein was tied off otherwise good deltoid and there was deteriorated subscapularis that was later resected in the case inflamed and torn biceps tendon was tenodesed later in the case of the partial release pectoralis major tendon we did a capsular release and she was very tight and had a significant capsular contracture we did a capsular release protecting the axillary nerve and dislocated the shoulder cut the head in standard version and exposed the glenoid.  Preoperative computer templating showed superior inclination this necessitated the reverse replacement along with the poor large rotator cuff tear of the posterior cuff.  At this point we used a 10 degree augment and she had a very small glenoid and we used a small 20 screw we had good reaming and drilled our 20 mm and we then placed a 20 mm baseplate with +2 offset there was soft bone but good purchase from the central post and good screw purchase throughout.  Baseplate was stable.  At this point we did place a neutral glenoid and central screw was secured and fully fastened.  At this point we used a small size 5 humeral stem  and did put some bone graft for better fit and fill with impaction in place and the final stem with a +6 conforming insert had good stability no overtensioning good range of motion of the shoulder.  At this point we did perform the biceps tenodesis the partially released pectoralis major tendon at this point we used topical TXA we had excellent hemostasis we did a layered closure.  We closed a portion of the deltopectoral interval copious chlorhexidine wash layered closure was performed.  Patient placed in abduction pillow sling there were no complications.  Debi Stover PA-C acted as the surgical assistant during this case and assistance greatly reduced operative time and aided in performance of the case  Complications:  None; patient tolerated the procedure well.    Disposition: PACU - hemodynamically stable.  Condition: stable         Additional Details: 0    Attending Attestation: I performed the procedure.    Rome Bear  Phone Number: [phone]

## 2023-11-15 NOTE — ANESTHESIA POSTPROCEDURE EVALUATION
Patient: Edda Webb    Procedure Summary       Date: 11/15/23 Room / Location: [facility] / [facility]    Anesthesia Start: 1215 Anesthesia Stop: 1400    Procedure: Arthroplasty Reverse Shoulder (Right: Arm Upper) Diagnosis:       Tear of right rotator cuff, unspecified tear extent, unspecified whether traumatic      Osteoarthritis of right shoulder, unspecified osteoarthritis type      (Tear of right rotator cuff, unspecified tear extent, unspecified whether traumatic [M75.101])      (Osteoarthritis of right shoulder, unspecified osteoarthritis type [M19.011])    Surgeons: Rome Bear MD Responsible Provider: JUAN ALBERTO Drummond    Anesthesia Type: general ASA Status: 3            Anesthesia Type: general    Vitals Value Taken Time   /85 11/15/23 1414   Temp 36 11/15/23 1414   Pulse 73 11/15/23 1356   Resp 22 11/15/23 1356   SpO2 94 11/15/23 1414       Anesthesia Post Evaluation    Patient location during evaluation: bedside  Patient participation: complete - patient participated  Level of consciousness: awake and alert  Pain management: adequate  Multimodal analgesia pain management approach  Airway patency: patent  Cardiovascular status: acceptable  Respiratory status: acceptable  Hydration status: acceptable  Postoperative Nausea and Vomiting: none        No notable events documented.

## 2023-11-16 VITALS
HEART RATE: 68 BPM | OXYGEN SATURATION: 94 % | TEMPERATURE: 97.8 F | DIASTOLIC BLOOD PRESSURE: 62 MMHG | HEIGHT: 69 IN | WEIGHT: 185.63 LBS | BODY MASS INDEX: 27.49 KG/M2 | RESPIRATION RATE: 16 BRPM | SYSTOLIC BLOOD PRESSURE: 114 MMHG

## 2023-11-16 PROBLEM — M75.101 TEAR OF RIGHT ROTATOR CUFF: Status: RESOLVED | Noted: 2023-10-24 | Resolved: 2023-11-16

## 2023-11-16 PROBLEM — M19.011 OSTEOARTHRITIS OF RIGHT SHOULDER, UNSPECIFIED OSTEOARTHRITIS TYPE: Status: RESOLVED | Noted: 2023-11-15 | Resolved: 2023-11-16

## 2023-11-16 PROBLEM — M19.011 OSTEOARTHRITIS OF RIGHT SHOULDER: Status: RESOLVED | Noted: 2023-10-24 | Resolved: 2023-11-16

## 2023-11-16 LAB
ANION GAP SERPL CALC-SCNC: 12 MMOL/L (ref 10–20)
BUN SERPL-MCNC: 19 MG/DL (ref 6–23)
CALCIUM SERPL-MCNC: 8.7 MG/DL (ref 8.6–10.3)
CHLORIDE SERPL-SCNC: 103 MMOL/L (ref 98–107)
CO2 SERPL-SCNC: 25 MMOL/L (ref 21–32)
CREAT SERPL-MCNC: 0.81 MG/DL (ref 0.5–1.05)
ERYTHROCYTE [DISTWIDTH] IN BLOOD BY AUTOMATED COUNT: 13 % (ref 11.5–14.5)
GFR SERPL CREATININE-BSD FRML MDRD: 78 ML/MIN/1.73M*2
GLUCOSE SERPL-MCNC: 106 MG/DL (ref 74–99)
HCT VFR BLD AUTO: 33.7 % (ref 36–46)
HGB BLD-MCNC: 10.8 G/DL (ref 12–16)
MCH RBC QN AUTO: 29.6 PG (ref 26–34)
MCHC RBC AUTO-ENTMCNC: 32 G/DL (ref 32–36)
MCV RBC AUTO: 92 FL (ref 80–100)
NRBC BLD-RTO: ABNORMAL /100{WBCS}
PLATELET # BLD AUTO: 258 X10*3/UL (ref 150–450)
POTASSIUM SERPL-SCNC: 4.1 MMOL/L (ref 3.5–5.3)
RBC # BLD AUTO: 3.65 X10*6/UL (ref 4–5.2)
SODIUM SERPL-SCNC: 136 MMOL/L (ref 136–145)
WBC # BLD AUTO: 8 X10*3/UL (ref 4.4–11.3)

## 2023-11-16 PROCEDURE — 96376 TX/PRO/DX INJ SAME DRUG ADON: CPT | Performed by: ORTHOPAEDIC SURGERY

## 2023-11-16 PROCEDURE — 36415 COLL VENOUS BLD VENIPUNCTURE: CPT | Performed by: PHYSICIAN ASSISTANT

## 2023-11-16 PROCEDURE — 2500000001 HC RX 250 WO HCPCS SELF ADMINISTERED DRUGS (ALT 637 FOR MEDICARE OP): Performed by: PHYSICIAN ASSISTANT

## 2023-11-16 PROCEDURE — 96366 THER/PROPH/DIAG IV INF ADDON: CPT | Performed by: ORTHOPAEDIC SURGERY

## 2023-11-16 PROCEDURE — 2500000004 HC RX 250 GENERAL PHARMACY W/ HCPCS (ALT 636 FOR OP/ED): Performed by: PHYSICIAN ASSISTANT

## 2023-11-16 PROCEDURE — G0378 HOSPITAL OBSERVATION PER HR: HCPCS

## 2023-11-16 PROCEDURE — 2500000001 HC RX 250 WO HCPCS SELF ADMINISTERED DRUGS (ALT 637 FOR MEDICARE OP): Performed by: EMERGENCY MEDICINE

## 2023-11-16 PROCEDURE — 96366 THER/PROPH/DIAG IV INF ADDON: CPT

## 2023-11-16 PROCEDURE — 97530 THERAPEUTIC ACTIVITIES: CPT | Mod: GP

## 2023-11-16 PROCEDURE — 80048 BASIC METABOLIC PNL TOTAL CA: CPT | Performed by: PHYSICIAN ASSISTANT

## 2023-11-16 PROCEDURE — 97162 PT EVAL MOD COMPLEX 30 MIN: CPT | Mod: GP

## 2023-11-16 PROCEDURE — 85027 COMPLETE CBC AUTOMATED: CPT | Performed by: PHYSICIAN ASSISTANT

## 2023-11-16 PROCEDURE — 97110 THERAPEUTIC EXERCISES: CPT | Mod: GP

## 2023-11-16 PROCEDURE — 2500000004 HC RX 250 GENERAL PHARMACY W/ HCPCS (ALT 636 FOR OP/ED): Performed by: EMERGENCY MEDICINE

## 2023-11-16 RX ORDER — OXYCODONE AND ACETAMINOPHEN 5; 325 MG/1; MG/1
1 TABLET ORAL EVERY 6 HOURS PRN
Qty: 28 TABLET | Refills: 0 | Status: SHIPPED | OUTPATIENT
Start: 2023-11-16 | End: 2023-11-23

## 2023-11-16 RX ORDER — KETOROLAC TROMETHAMINE 30 MG/ML
30 INJECTION, SOLUTION INTRAMUSCULAR; INTRAVENOUS ONCE
Status: COMPLETED | OUTPATIENT
Start: 2023-11-16 | End: 2023-11-16

## 2023-11-16 RX ORDER — ONDANSETRON 4 MG/1
4 TABLET, FILM COATED ORAL EVERY 8 HOURS PRN
Qty: 15 TABLET | Refills: 0 | Status: SHIPPED | OUTPATIENT
Start: 2023-11-16 | End: 2023-11-21

## 2023-11-16 RX ORDER — GABAPENTIN 600 MG/1
600 TABLET ORAL 3 TIMES DAILY
Qty: 90 TABLET | Refills: 2 | Status: SHIPPED | OUTPATIENT
Start: 2023-11-16 | End: 2024-04-12

## 2023-11-16 RX ORDER — KETOROLAC TROMETHAMINE 10 MG/1
10 TABLET, FILM COATED ORAL EVERY 6 HOURS PRN
Qty: 20 TABLET | Refills: 0 | Status: SHIPPED | OUTPATIENT
Start: 2023-11-16 | End: 2023-12-26 | Stop reason: WASHOUT

## 2023-11-16 RX ORDER — KETOROLAC TROMETHAMINE 15 MG/ML
15 INJECTION, SOLUTION INTRAMUSCULAR; INTRAVENOUS ONCE
Status: COMPLETED | OUTPATIENT
Start: 2023-11-16 | End: 2023-11-16

## 2023-11-16 RX ORDER — ALBUTEROL SULFATE 0.83 MG/ML
2.5 SOLUTION RESPIRATORY (INHALATION) EVERY 6 HOURS PRN
Qty: 75 ML | Refills: 11 | Status: SHIPPED | OUTPATIENT
Start: 2023-11-16 | End: 2023-12-27 | Stop reason: ALTCHOICE

## 2023-11-16 RX ADMIN — DOCUSATE SODIUM 100 MG: 50 LIQUID ORAL at 09:49

## 2023-11-16 RX ADMIN — GABAPENTIN 600 MG: 300 CAPSULE ORAL at 09:50

## 2023-11-16 RX ADMIN — ONDANSETRON 4 MG: 2 INJECTION INTRAMUSCULAR; INTRAVENOUS at 07:05

## 2023-11-16 RX ADMIN — KETOROLAC TROMETHAMINE 15 MG: 15 INJECTION, SOLUTION INTRAMUSCULAR; INTRAVENOUS at 15:23

## 2023-11-16 RX ADMIN — OXYCODONE HYDROCHLORIDE 5 MG: 5 TABLET ORAL at 03:55

## 2023-11-16 RX ADMIN — ACETAMINOPHEN 650 MG: 325 TABLET ORAL at 11:31

## 2023-11-16 RX ADMIN — CEFAZOLIN SODIUM 2 G: 2 INJECTION, SOLUTION INTRAVENOUS at 03:46

## 2023-11-16 RX ADMIN — ACETAMINOPHEN 650 MG: 325 TABLET ORAL at 00:03

## 2023-11-16 RX ADMIN — POLYETHYLENE GLYCOL 3350 17 G: 17 POWDER, FOR SOLUTION ORAL at 09:50

## 2023-11-16 RX ADMIN — GABAPENTIN 600 MG: 300 CAPSULE ORAL at 15:23

## 2023-11-16 RX ADMIN — KETOROLAC TROMETHAMINE 30 MG: 30 INJECTION INTRAMUSCULAR; INTRAVENOUS at 10:17

## 2023-11-16 RX ADMIN — LISINOPRIL 10 MG: 10 TABLET ORAL at 09:50

## 2023-11-16 RX ADMIN — PANTOPRAZOLE SODIUM 40 MG: 40 TABLET, DELAYED RELEASE ORAL at 09:50

## 2023-11-16 RX ADMIN — ASPIRIN 81 MG: 81 TABLET, COATED ORAL at 09:00

## 2023-11-16 RX ADMIN — ACETAMINOPHEN 650 MG: 325 TABLET ORAL at 06:12

## 2023-11-16 ASSESSMENT — COGNITIVE AND FUNCTIONAL STATUS - GENERAL
MOVING TO AND FROM BED TO CHAIR: A LITTLE
DRESSING REGULAR UPPER BODY CLOTHING: A LITTLE
DAILY ACTIVITIY SCORE: 19
TOILETING: A LITTLE
CLIMB 3 TO 5 STEPS WITH RAILING: A LITTLE
PERSONAL GROOMING: A LITTLE
CLIMB 3 TO 5 STEPS WITH RAILING: A LITTLE
HELP NEEDED FOR BATHING: A LITTLE
STANDING UP FROM CHAIR USING ARMS: A LITTLE
MOBILITY SCORE: 23
MOBILITY SCORE: 21
DRESSING REGULAR LOWER BODY CLOTHING: A LITTLE

## 2023-11-16 ASSESSMENT — PAIN DESCRIPTION - LOCATION
LOCATION: SHOULDER

## 2023-11-16 ASSESSMENT — PAIN SCALES - GENERAL
PAINLEVEL_OUTOF10: 4
PAINLEVEL_OUTOF10: 8
PAINLEVEL_OUTOF10: 0 - NO PAIN
PAINLEVEL_OUTOF10: 5 - MODERATE PAIN
PAINLEVEL_OUTOF10: 0 - NO PAIN
PAINLEVEL_OUTOF10: 5 - MODERATE PAIN
PAINLEVEL_OUTOF10: 4
PAINLEVEL_OUTOF10: 6
PAINLEVEL_OUTOF10: 6
PAINLEVEL_OUTOF10: 7
PAINLEVEL_OUTOF10: 4

## 2023-11-16 ASSESSMENT — PAIN - FUNCTIONAL ASSESSMENT
PAIN_FUNCTIONAL_ASSESSMENT: 0-10

## 2023-11-16 ASSESSMENT — PAIN DESCRIPTION - ORIENTATION
ORIENTATION: RIGHT

## 2023-11-16 ASSESSMENT — ACTIVITIES OF DAILY LIVING (ADL)
ADL_ASSISTANCE: INDEPENDENT
ADLS_ADDRESSED: YES

## 2023-11-16 ASSESSMENT — PAIN SCALES - WONG BAKER
WONGBAKER_NUMERICALRESPONSE: HURTS LITTLE MORE
WONGBAKER_NUMERICALRESPONSE: HURTS EVEN MORE
WONGBAKER_NUMERICALRESPONSE: HURTS LITTLE MORE
WONGBAKER_NUMERICALRESPONSE: NO HURT

## 2023-11-16 NOTE — NURSING NOTE
Patient assessment completed atthis time, denies pain. Pt is A&O x4 follows commands appropriately neuro checks completed, states she still has some numbness in right hand, surgical dressing on right shoulder is dry/intact. Pt transferred to BSC  w/ assistance x2 to attempt to void c/o dizziness when standing, call light is within reach.

## 2023-11-16 NOTE — NURSING NOTE
Pt. Vital signs obtained.  Pt. Ambulated to the bathroom.  Pt. Up to the chair.  Pt. Ordered breakfast.  Call light within reach  Chair alarm on.

## 2023-11-16 NOTE — PROGRESS NOTES
Patient seen, chart reviewed.  Denies any pain at this time.  Is feeling slightly nauseous, and was just medicated.  No vomiting.  Vital signs are stable.      Gloria Esparza MD

## 2023-11-16 NOTE — NURSING NOTE
Patient was not able to void will try again later. Pt had some c/o nausea denies need for medication, wanted some ginger ale at this time. Pt was given scheduled Ancef atb , education provided. No further needs noted, call light is within reach.

## 2023-11-16 NOTE — NURSING NOTE
Patient c/o 6/10 right shoulder pain requested pain meds. Pt was given prn oxycodone 5mg tab by mouth. No further needs noted, call light is within reach.

## 2023-11-16 NOTE — CARE PLAN
The patient's goals for the shift include      The clinical goals for the shift include pain management    Over the shift, the patient did not make progress toward the following goals. Barriers to progression include pain meds, ice packs . Recommendations to address these barriers include  pain meds, ice packs.

## 2023-11-16 NOTE — NURSING NOTE
Patient has no c/o pain at this time. Pt was bladder scanned for 488cc, wanted to attempt to use bathroom. Pt ambulated to and from bathroom within assistance x1 gait steady . Patient was able to void 100cc. Nom further needs noted, call light is within reach.

## 2023-11-16 NOTE — CARE PLAN
"RN TCC met with patient at the bedside to complete assessment. (See EPIC)  Pt POD #1 right total shoulder replacement with Dr. Bear.  Pt states she \"had a rough night.\" Pain not managed well, felt nauseated and just overall uncomfortable.  Team to adjust pain medications.  Will give IV Toradol to assist with relief.   Once pain under control and patient comfortable, the plan is to discharge home today. No skilled needs. OPT within 3 weeks.     "

## 2023-11-16 NOTE — NURSING NOTE
Patient was given scheduled tylenol 650mg by mouth as ordered for right shoulder pain. No further needs noted, call light is within reach.

## 2023-11-16 NOTE — CONSULTS
Consults    Reason For Consult  Status post right total shoulder replacement medical management requested for history of asthma hyperlipidemia hypertension and pain current    History Of Present Illness  Edda Webb is a 73 y.o. female status post right total shoulder replacement     Past Medical History  She has a past medical history of Abnormal finding of blood chemistry, unspecified (08/08/2016), Abnormal weight gain (03/14/2014), Asthma, Delayed emergence from general anesthesia, Encounter for immunization (08/08/2016), Hyperlipidemia, Hyperlipidemia, Hypertension, Hypertension, Neuropathy, Other abnormal glucose (08/08/2016), Other conditions influencing health status, Pain in right shoulder (02/02/2016), Pain in right shoulder (02/02/2016), Peripheral neuropathy, Personal history of diseases of the skin and subcutaneous tissue (08/08/2016), Personal history of other diseases of the nervous system and sense organs (02/05/2016), Personal history of other specified conditions (08/08/2016), Pneumonia, and Primary stabbing headache (08/08/2016).    Surgical History  She has a past surgical history that includes Hysterectomy (02/02/2016); pacemaker placement (Left); Laparoscopic Nissen fundoplication; Wrist fracture surgery (Left); and Appendectomy.     Social History  She reports that she has quit smoking. Her smoking use included cigarettes. She has a 43.00 pack-year smoking history. She has never used smokeless tobacco. She reports current alcohol use of about 17.0 standard drinks of alcohol per week. She reports that she does not use drugs.    Family History  Family History   Problem Relation Name Age of Onset    Heart disease Mother      Hypertension Mother      Other (cardiac disorder) Mother      Diabetes Mother      Lung cancer Father      Hypertension Sister      Diabetes Sister      Cancer Sister      Cancer Brother      Breast cancer Sibling          Allergies  Codeine    Review of  "Systems  Noncontributory  Physical Exam  Patient is alert in no acute distress  Lungs are clear to auscultation and percussion  Cardiac is regular rate and rhythm normal S1-S2 without murmur gallop rub click S3 or S4  Abdomen is soft nontender positive bowel sounds there is no hepatosplenomegaly or CVA tenderness appreciated  Extremities without cyanosis clubbing erythema or edema  Operative site clean dry no evidence for infection patient is able to move her fingers pulses are intact and extremities warm  Last Recorded Vitals  Blood pressure 141/78, pulse 74, temperature 37 °C (98.6 °F), temperature source Temporal, resp. rate 18, height 1.753 m (5' 9\"), weight 84.2 kg (185 lb 10 oz), SpO2 100 %.    Relevant Results  Scheduled medications  acetaminophen, 650 mg, oral, q6h DAVID  aspirin, 81 mg, oral, Daily  atorvastatin, 10 mg, oral, Nightly  docusate sodium, 100 mg, oral, BID  gabapentin, 600 mg, oral, TID  lisinopril, 10 mg, oral, Daily  loratadine, 10 mg, oral, Daily  pantoprazole, 40 mg, oral, Daily  polyethylene glycol, 17 g, oral, Daily      Continuous medications  lactated Ringer's, 100 mL/hr, Last Rate: 100 mL/hr (11/16/23 0158)      PRN medications  PRN medications: acetaminophen, albuterol, alum-mag hydroxide-simeth, benzocaine-menthol, benzonatate, diphenhydrAMINE, HYDROmorphone, melatonin, naloxone, ondansetron ODT **OR** ondansetron, oxyCODONE, oxyCODONE, oxygen, peg 400-hypromellose-glycerin, sodium chloride    Results for orders placed or performed during the hospital encounter of 11/15/23 (from the past 24 hour(s))   CBC   Result Value Ref Range    WBC 8.0 4.4 - 11.3 x10*3/uL    nRBC      RBC 3.65 (L) 4.00 - 5.20 x10*6/uL    Hemoglobin 10.8 (L) 12.0 - 16.0 g/dL    Hematocrit 33.7 (L) 36.0 - 46.0 %    MCV 92 80 - 100 fL    MCH 29.6 26.0 - 34.0 pg    MCHC 32.0 32.0 - 36.0 g/dL    RDW 13.0 11.5 - 14.5 %    Platelets 258 150 - 450 x10*3/uL   Basic metabolic panel   Result Value Ref Range    Glucose 106 " (H) 74 - 99 mg/dL    Sodium 136 136 - 145 mmol/L    Potassium 4.1 3.5 - 5.3 mmol/L    Chloride 103 98 - 107 mmol/L    Bicarbonate 25 21 - 32 mmol/L    Anion Gap 12 10 - 20 mmol/L    Urea Nitrogen 19 6 - 23 mg/dL    Creatinine 0.81 0.50 - 1.05 mg/dL    eGFR 78 >60 mL/min/1.73m*2    Calcium 8.7 8.6 - 10.3 mg/dL         Assessment/Plan   Status post right total shoulder replacement  Management per Ortho  PT  Pain control  Supportive care    COPD  Stable  Continue pulmonary toilet    Hypertension  Stable  Continue current medications    Hyperlipidemia  Continue current medications    DVT prophylax  SCDs and a discharge compression hose  Ambulate    I spent 40 minutes in the professional and overall care of this patient.

## 2023-11-16 NOTE — CARE PLAN
The patient's goals for the shift include  no nausea    The clinical goals for the shift include pain management

## 2023-11-16 NOTE — PROGRESS NOTES
"Edda Webb is a 73 y.o. female on day 0 of admission presenting with Osteoarthritis of right shoulder, unspecified osteoarthritis type.    Subjective   Doing fairly well, having some mild nausea post-op controlled with medication. Pain under control, nerve block wearing off. Wound is clean and dry, no signs of infection. We will plan for dc today to home as long as medically stable       Objective     Physical Exam  On exam the right shoulder wound is clean and dry, no early signs of infection or hematoma. Axillary nerve sensation intact. Able to move wrist and digits. Neurovascularly intact distally.    Last Recorded Vitals  Blood pressure 117/65, pulse 69, temperature 36.1 °C (97 °F), temperature source Temporal, resp. rate 16, height 1.753 m (5' 9\"), weight 84.2 kg (185 lb 10 oz), SpO2 92 %.  Intake/Output last 3 Shifts:  I/O last 3 completed shifts:  In: 3541.7 (42.1 mL/kg) [P.O.:400; I.V.:2941.7 (34.9 mL/kg); IV Piggyback:200]  Out: 750 (8.9 mL/kg) [Urine:750 (0.2 mL/kg/hr)]  Weight: 84.2 kg     Relevant Results      Scheduled medications  acetaminophen, 650 mg, oral, q6h DAVID  aspirin, 81 mg, oral, Daily  atorvastatin, 10 mg, oral, Nightly  docusate sodium, 100 mg, oral, BID  gabapentin, 600 mg, oral, TID  lisinopril, 10 mg, oral, Daily  loratadine, 10 mg, oral, Daily  pantoprazole, 40 mg, oral, Daily  polyethylene glycol, 17 g, oral, Daily      Continuous medications  lactated Ringer's, 100 mL/hr, Last Rate: 100 mL/hr (11/16/23 0158)      PRN medications  PRN medications: acetaminophen, albuterol, alum-mag hydroxide-simeth, benzocaine-menthol, benzonatate, diphenhydrAMINE, HYDROmorphone, melatonin, naloxone, ondansetron ODT **OR** ondansetron, oxyCODONE, oxyCODONE, oxygen, peg 400-hypromellose-glycerin, sodium chloride  Results for orders placed or performed during the hospital encounter of 11/15/23 (from the past 24 hour(s))   CBC   Result Value Ref Range    WBC 8.0 4.4 - 11.3 x10*3/uL    nRBC      RBC " 3.65 (L) 4.00 - 5.20 x10*6/uL    Hemoglobin 10.8 (L) 12.0 - 16.0 g/dL    Hematocrit 33.7 (L) 36.0 - 46.0 %    MCV 92 80 - 100 fL    MCH 29.6 26.0 - 34.0 pg    MCHC 32.0 32.0 - 36.0 g/dL    RDW 13.0 11.5 - 14.5 %    Platelets 258 150 - 450 x10*3/uL   Basic metabolic panel   Result Value Ref Range    Glucose 106 (H) 74 - 99 mg/dL    Sodium 136 136 - 145 mmol/L    Potassium 4.1 3.5 - 5.3 mmol/L    Chloride 103 98 - 107 mmol/L    Bicarbonate 25 21 - 32 mmol/L    Anion Gap 12 10 - 20 mmol/L    Urea Nitrogen 19 6 - 23 mg/dL    Creatinine 0.81 0.50 - 1.05 mg/dL    eGFR 78 >60 mL/min/1.73m*2    Calcium 8.7 8.6 - 10.3 mg/dL                            Assessment/Plan   Active Problems:  There are no active Hospital Problems.    Right shoulder osteoarthritis    Plan for dc today to home as long as medically stable. Will send in prescription for pain medication and Zofran to pharmacy. She will be shown home exercises and will follow up in PT as an outpt next week. She can see us in 2 weeks for a post-op appt       I spent 15 minutes in the professional and overall care of this patient.      Debi Stover PA-C

## 2023-11-16 NOTE — DISCHARGE SUMMARY
Discharge Diagnosis  Osteoarthritis of right shoulder, unspecified osteoarthritis type    Issues Requiring Follow-Up  Post-op in 2 weeks    Test Results Pending At Discharge  Pending Labs       No current pending labs.            Hospital Course   No issues    Pertinent Physical Exam At Time of Discharge  Physical Exam    Home Medications     Medication List      START taking these medications     loratadine 10 mg tablet; Commonly known as: Claritin; Take 1 tablet (10   mg) by mouth once daily.   ondansetron 4 mg tablet; Commonly known as: Zofran; Take 1 tablet (4 mg)   by mouth every 8 hours if needed for nausea or vomiting for up to 5 days.   oxyCODONE-acetaminophen 5-325 mg tablet; Commonly known as: Percocet;   Take 1 tablet by mouth every 6 hours if needed for severe pain (7 - 10)   for up to 7 days.     CONTINUE taking these medications     acetaminophen 650 mg ER tablet; Commonly known as: Tylenol 8 HOUR   aspirin 81 mg EC tablet   CENTRUM ADULT 50 PLUS ORAL   Fish OiL 1,000 mg (120 mg-180 mg) capsule; Generic drug: omega   3-dha-epa-fish oil   gabapentin 600 mg tablet; Commonly known as: Neurontin   lisinopril 10 mg tablet   pantoprazole 40 mg EC tablet; Commonly known as: ProtoNix   PAPAYA ENZYME ORAL   Proventil HFA 90 mcg/actuation inhaler; Generic drug: albuterol   simvastatin 20 mg tablet; Commonly known as: Zocor   triamcinolone 0.1 % cream; Commonly known as: Kenalog     ASK your doctor about these medications     chlorhexidine 0.12 % solution; Commonly known as: Peridex; Use 15 mL in   the mouth or throat once daily for 2 doses. 15 ML night before surgery and   15 ML morning of surgery. Swish and spit; Ask about: Should I take this   medication?       Outpatient Follow-Up  Future Appointments   Date Time Provider Department Center   11/28/2023 11:30 AM Rome Bear MD [id number] None   12/12/2023  4:00 PM [email or web address] [place]   12/27/2023  9:45 AM Lui Lock DO [id number]  [facility]   12/29/2023  8:15 AM POR CT 1 PORCT [facility] [place]   1/16/2024  9:00 AM YRIS Nguyen-CNP [email or web address] [facility]   1/29/2024 10:15 AM Bandar Valdes MD [id number] [facility]       Debi Stover PA-C

## 2023-11-16 NOTE — NURSING NOTE
Patient was given scheduled tylenol 650 mg by mouth as ordered for right shoulder pain 4/10. No further needs noted, call light is within reach.

## 2023-11-16 NOTE — NURSING NOTE
Assumed care. Pt resting quietly in bed with call light in reach. Shoulder incision appeared red. Dr Bear at bedside and assessed shoulder and said this is a normal finding.

## 2023-11-16 NOTE — NURSING NOTE
Patient requested nausea medication at this time, was given prn Zofran 4mg tab by mouth with ginger ale and crackers.

## 2023-11-16 NOTE — PROGRESS NOTES
Interdisciplinary Rounds were completed at the bedside with Patient.  Staff participating in rounds included: Clinical Nurse, Orthopedic Coordinator, Transitional Care Coordinator, Director of Nursing/Assistant Nurse Manager, Hospitalist MD/PA, and Physical Therapist.  Topics discussed included: Today's Plan of Care, Discharge Plan and Accommodations, Physical Therapy, and Medications/Preferred Pharmacy and the Patient was given the opportunity to ask additional questions or bring up any concerns at that time.  During the final discharge discussion and review of instructions, they will have another opportunity to review questions or concerns prior to leaving our care.  Patient was given information on who to call post-discharge should new questions or concerns arise.      The patient's plan includes:    Discharge Date/Disposition:  Home, Today with, and No Services Necessary  Discharge Needs: No Equipment Needs Identified  Medications/Pharmacy: Patient will  discharge prescriptions at primary pharmacy on file

## 2023-11-16 NOTE — NURSING NOTE
Patient c/o nausea at this time, was given prn Zofran 4mg IVP as ordered. Pt has mild right shoulder pain repositioned for comfort and given ice pack. No further needs noted, call light is within reach.

## 2023-11-17 ENCOUNTER — TELEPHONE (OUTPATIENT)
Dept: ORTHOPEDIC SURGERY | Facility: CLINIC | Age: 73
End: 2023-11-17
Payer: MEDICARE

## 2023-11-17 NOTE — CARE PLAN
Problem: PT Problem  Goal: Patient will recall and demonstrate post operative shoulder precautions.  Outcome: Progressing     Problem: Balance  Goal: LTG - Patient will maintain balance to allow for safe mobility  Outcome: Progressing     Problem: Mobility  Goal: LTG - Patient will ambulate community distance  Outcome: Progressing  Goal: LTG - Patient will navigate 4-6 steps with rails/device  Outcome: Progressing     Problem: Safety  Goal: LTG - Patient will demonstrate safety requirements appropriate to situation/environment  Outcome: Progressing     Problem: Transfers  Goal: LTG - Patient will demonstrate safe transfer techniques  Outcome: Progressing     Problem: Pain  Goal: LTG - Patient will manage pain with the appropriate technique/Intervention  Outcome: Progressing     Problem: Compromised Skin Integrity  Goal: LTG - Patient will be free from infection  Outcome: Progressing

## 2023-11-17 NOTE — PROGRESS NOTES
Physical Therapy    Physical Therapy Evaluation    Patient Name: Edda Webb  MRN: [id number]  Today's Date: 11/16/2023   Time Calculation  Start Time: 1006  Stop Time: 1046  Time Calculation (min): 40 min    Assessment/Plan   Pt presents with impaired functional mobility s/p R RTSR. Recommend discharge home with 24 hour supervision/intermittent assistance and home health PT. Pt was issued HEP handout. Pt verbalized and demonstrated understanding.   PT Assessment  PT Assessment Results: Decreased strength, Impaired balance, Decreased mobility, Decreased endurance, Decreased range of motion, Decreased coordination, Pain, Orthopedic restrictions  Rehab Prognosis: Good  Evaluation/Treatment Tolerance: Patient limited by pain  End of Session Communication: Bedside nurse  End of Session Patient Position: Up in chair, BLE elevated, ice to surgical site and to R axillary region, call light in reach, needs met, RN aware.  IP OR SWING BED PT PLAN  Inpatient or Swing Bed: Inpatient  PT Plan  Treatment/Interventions: Transfer training, Bed mobility, Gait training, Stair training, Balance training, Endurance training, Strengthening, Range of motion, Therapeutic exercise, Therapeutic activity, Home exercise program  PT Plan: Skilled PT  PT Frequency: 5 times per week  PT Discharge Recommendations: Low intensity level of continued care  PT Recommended Transfer Status: Stand by assist  PT - OK to Discharge: Yes      Subjective   General Visit Information:  General  Reason for Referral: Pt is s/p R reverse total shoulder replacement.  Referred By: Dr. Bear  Past Medical History Relevant to Rehab: HTN, OA, migraines, COPD, neuropathy, pneumonia, osteoporosis  Prior to Session Communication: Bedside nurse  Patient Position Received: Up in chair  Home Living:  Home Living  Type of Home: House  Lives With: Spouse  Home Adaptive Equipment: None  Home Layout: Multi-level, Bed/bath upstairs, Stairs to alternate level with  rails  Alternate Level Stairs-Number of Steps: 12  Home Access: Stairs to enter without rails  Entrance Stairs-Number of Steps: 3  Prior Level of Function:  Prior Function Per Pt/Caregiver Report  Level of [place]: Independent with ADLs and functional transfers, Independent with homemaking with ambulation  ADL Assistance: Independent  Homemaking Assistance: Independent  Ambulatory Assistance: Independent  Vocational: Retired  Prior Function Comments: Pt denies falls prior to admission.  Precautions:  Precautions  UE Weight Bearing Status: Right Non-Weight Bearing  Post-Surgical Precautions: Right shoulder precautions  Braces Applied: PT adjusted patient's sling for appropriate fit and comfort. PT instructed patient in donning and doffing sling    Objective   Pain:  Pain Assessment  Pain Assessment: 0-10  Pain Score: 7  Pain Type: Surgical pain  Pain Location: Shoulder  Pain Orientation: Right  Pain Interventions: Medication (See MAR), Cold applied, Repositioned  Cognition:  Cognition  Overall Cognitive Status: Within Functional Limits  Attention: Within Functional Limits  Memory: Within Funtional Limits  Problem Solving: Within Functional Limits  Safety/Judgement: Within Functional Limits    General Assessments:  Activity Tolerance  Endurance: Tolerates less than 10 min exercise, no significant change in vital signs (limited due to pain)    Sensation  Light Touch: No apparent deficits    Coordination  Movements are Fluid and Coordinated: No  Upper Body Coordination: RUE post-op deficits due to pain    Static Sitting Balance  Static Sitting-Balance Support: Feet supported  Static Sitting-Level of Assistance: Independent  Dynamic Sitting Balance  Dynamic Sitting-Balance Support: Feet supported  Dynamic Sitting-Comments: Independent    Static Standing Balance  Static Standing-Balance Support: No upper extremity supported  Static Standing-Level of Assistance: Distant supervision  Dynamic Standing Balance  Dynamic  Standing-Balance Support: No upper extremity supported  Dynamic Standing-Comments: Supervision  Functional Assessments:  ADL  ADL's Addressed: Yes  UE Dressing Assistance: Minimal  UE Dressing Deficit: Thread RUE, Pull around back, Verbal cueing    Transfers  Transfer: Yes  Transfer 1  Transfer From 1: Chair with arms to  Transfer to 1: Chair with arms  Technique 1: Sit to stand, Stand to sit  Transfer Level of Assistance 1: Close supervision, Minimal verbal cues    Ambulation/Gait Training  Ambulation/Gait Training Performed: Yes  Ambulation/Gait Training 1  Surface 1: Level tile  Assistance 1: Close supervision  Quality of Gait 1:  (Decreased renita)  Comments/Distance (ft) 1: within hospital room  Extremity/Trunk Assessments:  RUE   RUE : Exceptions to WFL, shoulder ROM/strength limited due to post-op pain and surgeon restrictions.   LUE   LUE: Within Functional Limits  RLE   RLE : Within Functional Limits  LLE   LLE : Within Functional Limits    Therapeutic Exercise:  RUE elbow flexion/extension, wrist pronation/supination, wrist flexion/extension, and hand opening/closing x 10 reps.    Outcome Measures:  [facility] Basic Mobility  Turning from your back to your side while in a flat bed without using bedrails: None  Moving from lying on your back to sitting on the side of a flat bed without using bedrails: None  Moving to and from bed to chair (including a wheelchair): None  Standing up from a chair using your arms (e.g. wheelchair or bedside chair): None  To walk in hospital room: None  Climbing 3-5 steps with railing: A little  Basic Mobility - Total Score: 23    Encounter Problems       Encounter Problems (Active)       Balance       LTG - Patient will maintain balance to allow for safe mobility (Progressing)       Start:  11/17/23               Compromised Skin Integrity       LTG - Patient will be free from infection (Progressing)       Start:  11/17/23               Mobility       LTG - Patient will ambulate  community distance (Progressing)       Start:  11/17/23            LTG - Patient will navigate 4-6 steps with rails/device (Progressing)       Start:  11/17/23               PT Problem       Patient will recall and demonstrate post operative shoulder precautions. (Progressing)       Start:  11/17/23               Pain          Safety       LTG - Patient will demonstrate safety requirements appropriate to situation/environment (Progressing)       Start:  11/17/23               Transfers       LTG - Patient will demonstrate safe transfer techniques (Progressing)       Start:  11/17/23                   Education Documentation  Handouts, taught by Opal Dc PT at 11/17/2023  3:53 PM.  Learner: Patient  Readiness: Acceptance  Method: Explanation, Demonstration, Handout  Response: Verbalizes Understanding, Demonstrated Understanding    Precautions, taught by Opal Dc PT at 11/17/2023  3:53 PM.  Learner: Patient  Readiness: Acceptance  Method: Explanation, Demonstration, Handout  Response: Verbalizes Understanding, Demonstrated Understanding    Body Mechanics, taught by Opal Dc PT at 11/17/2023  3:53 PM.  Learner: Patient  Readiness: Acceptance  Method: Explanation, Demonstration, Handout  Response: Verbalizes Understanding, Demonstrated Understanding    Home Exercise Program, taught by Opal Dc PT at 11/17/2023  3:53 PM.  Learner: Patient  Readiness: Acceptance  Method: Explanation, Demonstration, Handout  Response: Verbalizes Understanding, Demonstrated Understanding    Mobility Training, taught by Opal Dc PT at 11/17/2023  3:53 PM.  Learner: Patient  Readiness: Acceptance  Method: Explanation, Demonstration, Handout  Response: Verbalizes Understanding, Demonstrated Understanding    Education Comments  No comments found.    Opal Dc, PT, DPT

## 2023-11-20 ENCOUNTER — TELEPHONE (OUTPATIENT)
Dept: ORTHOPEDIC SURGERY | Facility: HOSPITAL | Age: 73
End: 2023-11-20
Payer: MEDICARE

## 2023-11-20 NOTE — TELEPHONE ENCOUNTER
Patient hasn't had a bowel movement since the day of surgery. Please give her a call at [phone].

## 2023-11-20 NOTE — TELEPHONE ENCOUNTER
Will stop narcotic pain medication, will try to get into PCP ASAP. She has already taken OTC, will let me know if unable to get into PCP

## 2023-11-21 ENCOUNTER — EVALUATION (OUTPATIENT)
Dept: OCCUPATIONAL THERAPY | Facility: CLINIC | Age: 73
End: 2023-11-21
Payer: MEDICARE

## 2023-11-21 DIAGNOSIS — M19.011 PRIMARY OSTEOARTHRITIS OF RIGHT SHOULDER: ICD-10-CM

## 2023-11-21 DIAGNOSIS — M19.011 ARTHRITIS OF RIGHT SHOULDER REGION: Primary | ICD-10-CM

## 2023-11-21 PROCEDURE — 97535 SELF CARE MNGMENT TRAINING: CPT | Mod: GO | Performed by: OCCUPATIONAL THERAPIST

## 2023-11-21 PROCEDURE — 97110 THERAPEUTIC EXERCISES: CPT | Mod: GO | Performed by: OCCUPATIONAL THERAPIST

## 2023-11-21 PROCEDURE — 97165 OT EVAL LOW COMPLEX 30 MIN: CPT | Mod: GO | Performed by: OCCUPATIONAL THERAPIST

## 2023-11-21 ASSESSMENT — ACTIVITIES OF DAILY LIVING (ADL): HOME_MANAGEMENT_TIME_ENTRY: 10

## 2023-11-21 NOTE — PROGRESS NOTES
"  Evaluation/Treatment    Patient Name: Edda Webb  MRN: [id number]  : 1950  Today's Date: 23    Time Calculation  Start Time: 0845  Stop Time: 930  Time Calculation (min): 45 min  OT Evaluation Time Entry  OT Evaluation (Low) Time Entry: 15  OT Therapeutic Procedures Time Entry  Self Care/Home Management (ADLs) Time Entry: 10  Therapeutic Exercise Time Entry: 20      Subjective   Current Problem/Diagnosis:  1. Arthritis of right shoulder region        2. Primary osteoarthritis of right shoulder  Referral to Occupational Therapy    Follow Up In Occupational Therapy        Subjective Evaluation    History of Present Illness  Date of onset: 11/15/2021  Date of surgery: 11/15/2023  Mechanism of injury: Patient reports progressively worsening pain in R shoulder over last \"couple of years\"; underwent R RTSR on 11/15/23.    Quality of life: good    Pain  Current pain ratin  At best pain ratin  At worst pain ratin  Location: R shoulder  Quality: throbbing  Relieving factors: change in position, ice and medications    Social Support  Lives with: Into clinic with sister who will be residing with patient for next few weeks.         Precautions:  0-2 weeks:  Shoulder pendulums and AAROM elbow  2-6 weeks: AAROM/PROM shoulder flexion to 120 degrees; NO ROTATION     6-12 weeks: AROM shoulder; Do no push extreme motion  12+ weeks: Gentle strengthening    Objective     Prior Functional Status: Fully Independent     Work History:     Occupation and Activities:  Work status: retired  Current functional limitations: Grooming, Bathing, Dressing, Lifting, Holding, Writing, Crafts/hobbies, and Driving       Objective     Sensation: Denies abnormal sensation     Appearance: 15 cm incision with glue/clear bandage in tact; resolving ecchymosis noted throughout proximal aspect of RUE    Edema: Moderate RUE    Coordination: +Impaired      Range of Motion/Strength:     Upper Extremity ROM    AROM PROM     Right " Left Right Left   SHOULDER Flexion NT WFL NT WFL    Extension NT WFL NT WFL    Abduction NT WFL NT WFL    Internal Rotation NT WFL NT WFL    External Rotation NT WFL NT WFL     ELBOW Extension/Flexion -15/150 AAROM WFL WFL WFL     FOREARM Pronation 90 AAROM WFL WFL WFL    Supination 85 AAROM WFL WFL WFL     WRIST Flexion WFL WFL WFL WFL    Extension WFL WFL WFL WFL    Radial Deviation WFL WFL WFL WFL    Ulnar Deviation WFL WFL WFL WFL     Hand Range of Motion   All digits WFL bilaterally.      Hand Strength   (lbs) Right Left   1     2 N/a N/a   3     4     5       Pinch Right Left   2-pt N/a N/a   3-pt N/a N/a   Lateral N/a N/a       Outcome Measure: UEFI=0/80; 100% impaired     Splinting: RUE sling intact on arrival    Modalities: Educated patient on use  of hot/cold modalities for pain management and pre/post-HEP.    Therapy/Activity: Therapist provided demonstration with verbal instruction for scapular mobilization including elevation/depression, protraction/retraction, and CW/CCW circumduction x10 reps each; AAROM elbow flexion/extension, FA supination/pronation x10 reps each; AROM wrist flexion/extension, RD/UD, composite grasp/release, and opposition x10 reps each. Therapist provided demonstration with verbal instruction for pendulums side-to-side, FW/BW, and CW/CCW circumduction x10 reps. Written handouts issued; HEP established.    Therapist educated patient on surgical precautions and functional implications for ADL's/self-care tasks; instructed patient in compensatory tech's and task modifications incorporating one-handed tech's for adherence to precautions while encouraging independence and to promote pain management with performance.    EDUCATION:       Assessment & Plan     Assessment  Impairments: abnormal coordination, abnormal or restricted ROM, activity intolerance, impaired physical strength, lacks appropriate home exercise program, pain with function and weight-bearing intolerance  Assessment  details: Patient is a 73-year-old female who presents s/p R RTSR with subsequent post-op pain, impaired ROM, weakness, impaired coordination, and impaired functional use of dominant UE. Skilled OT intervention indicated to address deficits and facilitate return to PLOF.    Low complexity evaluation selected due to patient's clinical presentation, medical stability, and condition uncomplicated by existing co-morbidities that may affect patient's rehab tolerance, progression, and potential.   Prognosis: good    Plan  Planned therapy interventions: manual therapy, ADL retraining, flexibility, functional ROM exercises, home exercise program, IADL retraining, joint mobilization, motor coordination training, neuromuscular re-education, strengthening, stretching and therapeutic activities  Frequency: 1-2x/week.  Duration in visits: 16  Treatment plan discussed with: patient           Goals:  Active       OT Goals       1. Patient will increase R elbow AROM extension to 0 to increase functional use of RUE.       Start:  11/22/23    Expected End:  12/05/23            2. Patient will increase R shoulder AAROM flexion to 120 degrees to increase functional use of RUE.       Start:  11/22/23    Expected End:  12/19/23            3. Patient will increase R shoulder AROM flexion/abduction to at least 130 degrees to increase functional use of RUE.       Start:  11/22/23    Expected End:  01/30/24            4. Patient will increase R shoulder AROM ER to 60 degrees to increase functional use of RUE.        Start:  11/22/23    Expected End:  01/30/24            5. Patient will increase R shoulder strength to 4/5 grossly per MMT to increase functional use of RUE.       Start:  11/22/23    Expected End:  03/12/24            6. Patient will increase overall functional independence AEB UEFI score of 70/80 to return to PLOF.       Start:  11/22/23    Expected End:  03/12/24

## 2023-11-22 PROBLEM — M19.011 ARTHRITIS OF RIGHT SHOULDER REGION: Status: ACTIVE | Noted: 2023-11-22

## 2023-11-22 SDOH — ECONOMIC STABILITY: GENERAL: QUALITY OF LIFE: GOOD

## 2023-11-22 ASSESSMENT — ENCOUNTER SYMPTOMS
ALLEVIATING FACTORS: MEDICATIONS
ALLEVIATING FACTORS: CHANGE IN POSITION
ALLEVIATING FACTORS: ICE
PAIN SCALE AT LOWEST: 6
QUALITY: THROBBING
PAIN SCALE: 6
PAIN LOCATION: R SHOULDER
PAIN SCALE AT HIGHEST: 9

## 2023-11-27 ENCOUNTER — ANCILLARY PROCEDURE (OUTPATIENT)
Dept: RADIOLOGY | Facility: CLINIC | Age: 73
End: 2023-11-27
Payer: MEDICARE

## 2023-11-27 DIAGNOSIS — M19.011 OSTEOARTHRITIS OF RIGHT SHOULDER, UNSPECIFIED OSTEOARTHRITIS TYPE: ICD-10-CM

## 2023-11-27 PROCEDURE — 73030 X-RAY EXAM OF SHOULDER: CPT | Mod: RIGHT SIDE | Performed by: RADIOLOGY

## 2023-11-27 PROCEDURE — 73030 X-RAY EXAM OF SHOULDER: CPT | Mod: RT

## 2023-11-28 ENCOUNTER — OFFICE VISIT (OUTPATIENT)
Dept: ORTHOPEDIC SURGERY | Facility: CLINIC | Age: 73
End: 2023-11-28
Payer: MEDICARE

## 2023-11-28 DIAGNOSIS — M19.011 OSTEOARTHRITIS OF RIGHT SHOULDER, UNSPECIFIED OSTEOARTHRITIS TYPE: ICD-10-CM

## 2023-11-28 PROCEDURE — 1159F MED LIST DOCD IN RCRD: CPT | Performed by: ORTHOPAEDIC SURGERY

## 2023-11-28 PROCEDURE — 1160F RVW MEDS BY RX/DR IN RCRD: CPT | Performed by: ORTHOPAEDIC SURGERY

## 2023-11-28 PROCEDURE — 99024 POSTOP FOLLOW-UP VISIT: CPT | Performed by: ORTHOPAEDIC SURGERY

## 2023-11-28 PROCEDURE — 1036F TOBACCO NON-USER: CPT | Performed by: ORTHOPAEDIC SURGERY

## 2023-11-28 PROCEDURE — 1125F AMNT PAIN NOTED PAIN PRSNT: CPT | Performed by: ORTHOPAEDIC SURGERY

## 2023-11-28 ASSESSMENT — PAIN SCALES - GENERAL: PAINLEVEL_OUTOF10: 7

## 2023-11-28 ASSESSMENT — PAIN - FUNCTIONAL ASSESSMENT: PAIN_FUNCTIONAL_ASSESSMENT: 0-10

## 2023-11-28 NOTE — PROGRESS NOTES
Reason for Appointment  Chief Complaint   Patient presents with    Right Shoulder - Post-op     History of Present Illness  Patient is here today 2 weeks s/p right reverse shoulder replacement 11/15/23. Patient is doing well overall. X-rays today look excellent with good alignment and no loosening. Pain in the shoulder is still bothering her. She does have a small hematoma under the incision. She went to therapy once after surgery and has been doing some home exercises. We reviewed a home stretching program and massage.     Assessment   Encounter Diagnosis   Name Primary?    Osteoarthritis of right shoulder, unspecified osteoarthritis type      I, Twila Davis, attest that this documentation has been prepared under the direction and in the presence of Rome Bear MD. By signing below, I, Rome Bear MD, personally performed the services described in this documentation. All medical record entries made by the scribe were at my direction and in my presence. I have reviewed the chart and agree that the record reflects my personal performance and is accurate and complete. 11/28/23

## 2023-12-01 ENCOUNTER — TREATMENT (OUTPATIENT)
Dept: OCCUPATIONAL THERAPY | Facility: CLINIC | Age: 73
End: 2023-12-01
Payer: MEDICARE

## 2023-12-01 DIAGNOSIS — M19.011 PRIMARY OSTEOARTHRITIS OF RIGHT SHOULDER: ICD-10-CM

## 2023-12-01 DIAGNOSIS — M19.011 ARTHRITIS OF RIGHT SHOULDER REGION: Primary | ICD-10-CM

## 2023-12-01 PROCEDURE — 97140 MANUAL THERAPY 1/> REGIONS: CPT | Mod: GO | Performed by: OCCUPATIONAL THERAPIST

## 2023-12-01 PROCEDURE — 97110 THERAPEUTIC EXERCISES: CPT | Mod: GO | Performed by: OCCUPATIONAL THERAPIST

## 2023-12-01 ASSESSMENT — PAIN SCALES - GENERAL: PAINLEVEL_OUTOF10: 3

## 2023-12-01 ASSESSMENT — PAIN - FUNCTIONAL ASSESSMENT: PAIN_FUNCTIONAL_ASSESSMENT: 0-10

## 2023-12-01 NOTE — PROGRESS NOTES
"    Occupational Therapy  Occupational Therapy Treatment    Patient Name: Edda Webb  MRN: [id number]  Today's Date: 12/1/2023         Insurance:  Visit number: 1 of 32  Insurance Type: [facility] Medicare     General:  Reason for visit: R shoulder pain and stiffness   Referred by: Andriy     Current Problem  1. Arthritis of right shoulder region        2. Primary osteoarthritis of right shoulder  Follow Up In Occupational Therapy          Precautions   12 weeks=strengthening       Subjective:   Patient reports \"I am so stiff.\"    Performing HEP?: Yes    Pain  Pain Assessment: 0-10  Pain Score: 3  Pain Type: Surgical pain  Pain Location: Shoulder    Objective:     PROM: R shoulder flexion with forward lean=80    Physical Observation: hematoma is noted at R arm-pit region/chest wall   Edema: moderate      Treatments:     Modalities:      5 min  Modalities  Modalities Used: Yes  Modality 1: Untimed Hotpack    Therapeutic Exercise:   35 min  Therapeutic Exercise  Therapeutic Exercise Performed: Yes  Therapeutic Exercise Activity 1: digit 1-5 E/F and opposition x10 reps each  Therapeutic Exercise Activity 2: Wrist E/F and UD/RD x 10 reps each  Therapeutic Exercise Activity 3: FA sup/pron and elbow  E/F x 10 reps each  Therapeutic Exercise Activity 4: scapular mobs x 10  reps each  Therapeutic Exercise Activity 5: pendulums x 10 reps each  Therapeutic Exercise Activity 6: forward flexion leans x 10 reps each (added to HEP)  Therapeutic Exercise Activity 7: supine PROM with flexion, abduction, and ER x 10 reps each    Manual Therapy:     10 min  Manual Therapy  Manual Therapy Performed: Yes  Manual Therapy Activity 1: R upper trapezius tissue mobilization with use of Biofreeze      Assessment: Improvement is noted with R shoulder flexion this a.m.        Plan: Continue with plan of care.        Erna Wiseman OT  "

## 2023-12-04 ENCOUNTER — TREATMENT (OUTPATIENT)
Dept: OCCUPATIONAL THERAPY | Facility: CLINIC | Age: 73
End: 2023-12-04
Payer: MEDICARE

## 2023-12-04 DIAGNOSIS — M19.011 PRIMARY OSTEOARTHRITIS OF RIGHT SHOULDER: ICD-10-CM

## 2023-12-04 PROCEDURE — 97140 MANUAL THERAPY 1/> REGIONS: CPT | Mod: GO | Performed by: OCCUPATIONAL THERAPIST

## 2023-12-04 PROCEDURE — 97110 THERAPEUTIC EXERCISES: CPT | Mod: GO | Performed by: OCCUPATIONAL THERAPIST

## 2023-12-04 ASSESSMENT — PAIN - FUNCTIONAL ASSESSMENT: PAIN_FUNCTIONAL_ASSESSMENT: 0-10

## 2023-12-04 ASSESSMENT — PAIN SCALES - GENERAL: PAINLEVEL_OUTOF10: 2

## 2023-12-04 NOTE — PROGRESS NOTES
"    Occupational Therapy  Occupational Therapy Treatment    Patient Name: Edda Webb  MRN: [id number]  Today's Date: 12/4/2023         Insurance:  Visit number: 2 of 32  Insurance Type: [facility] Medicare     General:  Reason for visit: R shoulder   Referred by: Andriy     Current Problem  1. Primary osteoarthritis of right shoulder  Follow Up In Occupational Therapy          Precautions   12 weeks=shoulder strengthening       Subjective:   Patient reports \"The most pain I have is a 2-3.\"    Performing HEP?: Yes    Pain  Pain Assessment: 0-10  Pain Score: 2  Pain Type: Surgical pain  Pain Location: Shoulder    Objective:     PROM: R shoulder [id number] with forward leans     Physical Observation: patient presents with guarded positioning.     Sensory: intact    Treatments:     Modalities:      5 min  Modalities  Modalities Used: Yes  Modality 1: Untimed Hotpack    Therapeutic Exercise:   40 min  Therapeutic Exercise  Therapeutic Exercise Performed: Yes  Therapeutic Exercise Activity 3: FA sup/pron x  Therapeutic Exercise Activity 4: scapular mobs x 20 reps each  Therapeutic Exercise Activity 5: pendulums x 10 reps each  Therapeutic Exercise Activity 6: forward flexion leans x 10 reps  Therapeutic Exercise Activity 7: supien PROM  with flexion, abduction, and ER  Therapeutic Exercise Activity 8: ergometer x 5 mins  Therapeutic Exercise Activity 9: overhead pulleys x 10 reps with flexion    Manual Therapy:     10 min  Manual Therapy  Manual Therapy Performed: Yes  Manual Therapy Activity 1: R upper trapezius and rhomboid tissue mobilization with use of Biofreeze      Assessment:Patient has a poor pain tolerance and benefits from frequent reminders for full effort with TX.        Plan: Continue with plan of care 1-2x/wk       Erna Wiseman OT  "

## 2023-12-08 ENCOUNTER — TREATMENT (OUTPATIENT)
Dept: OCCUPATIONAL THERAPY | Facility: CLINIC | Age: 73
End: 2023-12-08
Payer: MEDICARE

## 2023-12-08 DIAGNOSIS — M19.011 PRIMARY OSTEOARTHRITIS OF RIGHT SHOULDER: ICD-10-CM

## 2023-12-08 DIAGNOSIS — M19.011 ARTHRITIS OF RIGHT SHOULDER REGION: Primary | ICD-10-CM

## 2023-12-08 PROCEDURE — 97110 THERAPEUTIC EXERCISES: CPT | Mod: GO | Performed by: OCCUPATIONAL THERAPIST

## 2023-12-08 PROCEDURE — 97140 MANUAL THERAPY 1/> REGIONS: CPT | Mod: GO | Performed by: OCCUPATIONAL THERAPIST

## 2023-12-08 ASSESSMENT — PAIN SCALES - GENERAL: PAINLEVEL_OUTOF10: 2

## 2023-12-08 ASSESSMENT — PAIN - FUNCTIONAL ASSESSMENT: PAIN_FUNCTIONAL_ASSESSMENT: 0-10

## 2023-12-08 NOTE — PROGRESS NOTES
"    Occupational Therapy  Occupational Therapy Treatment    Patient Name: Edda Webb  MRN: [id number]  Today's Date: 12/8/2023         Insurance:  Visit number: 3 of 32  Insurance Type: Medicare     General:  Reason for visit: R RTSR  Referred by: Andriy                   Current Problem  1. Primary osteoarthritis of right shoulder  Follow Up In Occupational Therapy          Precautions   12 weeks=strengthening of shoulder       Subjective:   Patient reports \"I feel like I am moving better.\"    Performing HEP?: Yes    Pain  Pain Assessment: 0-10  Pain Score: 2  Pain Type: Surgical pain  Pain Location: Shoulder  Pain Orientation: Right    Objective:     PROM: R shoulder [id number] with forward leans     Physical Observation: hematoma is still noticeable in R arm pit region.     Treatments:     Modalities:      5 min  Modalities  Modalities Used: No  Modality 1: Untimed Hotpack    Therapeutic Exercise:   40 min  Therapeutic Exercise  Therapeutic Exercise Performed: Yes  Therapeutic Exercise Activity 3: FA sup/pron and elbow E/f x 15 reps each  Therapeutic Exercise Activity 4: scapular mobs x 15 reps each  Therapeutic Exercise Activity 5: pendulums x 15 reps each  Therapeutic Exercise Activity 6: forward flexion leans x 10  Therapeutic Exercise Activity 8: ergometer x 6 mins  Therapeutic Exercise Activity 9: overhead pulleys x 10 reps with flex and ab    Manual Therapy:     10 min  Manual Therapy  Manual Therapy Performed: Yes  Manual Therapy Activity 1: R upper trapezius tissue mobilization with use of Biofreeze.    Assessment: Improvement is noted PROM with forward flexion this p.m.        Plan: Continue with plan of care.        Erna Wiseman OT  "

## 2023-12-11 ENCOUNTER — TREATMENT (OUTPATIENT)
Dept: OCCUPATIONAL THERAPY | Facility: CLINIC | Age: 73
End: 2023-12-11
Payer: MEDICARE

## 2023-12-11 DIAGNOSIS — M19.011 PRIMARY OSTEOARTHRITIS OF RIGHT SHOULDER: ICD-10-CM

## 2023-12-11 DIAGNOSIS — M19.011 ARTHRITIS OF RIGHT SHOULDER REGION: Primary | ICD-10-CM

## 2023-12-11 PROCEDURE — 97140 MANUAL THERAPY 1/> REGIONS: CPT | Mod: GO | Performed by: OCCUPATIONAL THERAPIST

## 2023-12-11 PROCEDURE — 97110 THERAPEUTIC EXERCISES: CPT | Mod: GO | Performed by: OCCUPATIONAL THERAPIST

## 2023-12-11 ASSESSMENT — PAIN - FUNCTIONAL ASSESSMENT: PAIN_FUNCTIONAL_ASSESSMENT: 0-10

## 2023-12-11 ASSESSMENT — PAIN SCALES - GENERAL: PAINLEVEL_OUTOF10: 2

## 2023-12-11 NOTE — PROGRESS NOTES
"    Occupational Therapy  Occupational Therapy Treatment    Patient Name: Edda Webb  MRN: [id number]  Today's Date: 12/11/2023         Insurance:  Visit number: 4 of 32  Insurance Type: [facility]    General:  Reason for visit: R shoulder pain and stiffness   Referred by: Andriy     Current Problem  1. Arthritis of right shoulder region        2. Primary osteoarthritis of right shoulder  Follow Up In Occupational Therapy          Precautions  12 weeks=strengthening        Subjective:   Patient reports \"I had a hard time sleeping last night.\"    Performing HEP?: Yes    Pain  Pain Assessment: 0-10  Pain Score: 2  Pain Type: Surgical pain  Pain Location: Shoulder  Pain Orientation: Right    Objective:     AAROM: R shoulder [id number] with forward leans     Physical Observation: minimal pocket/hematoma is noted at R arm pit region.   Edema: mild     Treatments:     Modalities:      5 min  Modalities  Modalities Used: Yes  Modality 1: Untimed Hotpack    Therapeutic Exercise:   40 min  Therapeutic Exercise  Therapeutic Exercise Performed: Yes  Therapeutic Exercise Activity 4: scapular mobs x 20 reps each  Therapeutic Exercise Activity 6: forward flexion leans x 15  Therapeutic Exercise Activity 8: ergometer x 6 mins  Therapeutic Exercise Activity 9: overhead pulleys x 10 reps with flexion and scaption    Manual Therapy:     10 min  Manual Therapy  Manual Therapy Performed: Yes  Manual Therapy Activity 1: R upper trapezius tissue mobilization        Assessment: Good tolerance with tx progression.        Plan: Continue with plan of care 1-2x/wk        Erna Wiseman OT  "

## 2023-12-12 ENCOUNTER — HOSPITAL ENCOUNTER (OUTPATIENT)
Dept: CARDIOLOGY | Facility: HOSPITAL | Age: 73
Discharge: HOME | End: 2023-12-12
Payer: MEDICARE

## 2023-12-12 DIAGNOSIS — Z95.0 PRESENCE OF CARDIAC PACEMAKER: ICD-10-CM

## 2023-12-12 DIAGNOSIS — R55 SYNCOPE AND COLLAPSE: ICD-10-CM

## 2023-12-12 DIAGNOSIS — R55 SYNCOPE AND COLLAPSE: Primary | ICD-10-CM

## 2023-12-14 ENCOUNTER — TREATMENT (OUTPATIENT)
Dept: OCCUPATIONAL THERAPY | Facility: CLINIC | Age: 73
End: 2023-12-14
Payer: MEDICARE

## 2023-12-14 DIAGNOSIS — M19.011 ARTHRITIS OF RIGHT SHOULDER REGION: Primary | ICD-10-CM

## 2023-12-14 DIAGNOSIS — M19.011 PRIMARY OSTEOARTHRITIS OF RIGHT SHOULDER: ICD-10-CM

## 2023-12-14 PROCEDURE — 97110 THERAPEUTIC EXERCISES: CPT | Mod: GO | Performed by: OCCUPATIONAL THERAPIST

## 2023-12-14 PROCEDURE — 97140 MANUAL THERAPY 1/> REGIONS: CPT | Mod: GO | Performed by: OCCUPATIONAL THERAPIST

## 2023-12-14 ASSESSMENT — PAIN SCALES - GENERAL: PAINLEVEL_OUTOF10: 1

## 2023-12-14 ASSESSMENT — PAIN - FUNCTIONAL ASSESSMENT: PAIN_FUNCTIONAL_ASSESSMENT: 0-10

## 2023-12-14 NOTE — PROGRESS NOTES
"    Occupational Therapy  Occupational Therapy Treatment    Patient Name: Edda Webb  MRN: [id number]  Today's Date: 12/14/2023         Insurance:  Visit number: 5 of 32  Insurance Type: [facility] Medicare     General:  Reason for visit: R shoulder pain and stiffness  Referred by: Andriy     Current Problem  1. Arthritis of right shoulder region        2. Primary osteoarthritis of right shoulder  Follow Up In Occupational Therapy          Precautions  12 weeks=strengthening        Subjective:   Patient reports \"I can tell that I am getting better.\"    Performing HEP?: Yes    Pain  Pain Assessment: 0-10  Pain Score: 1  Pain Type: Surgical pain  Pain Location: Shoulder  Pain Orientation: Right    Objective:     AAROM: R shoulder abduction=90 with pulleys     Physical Observation: no significant abnormalities    Sensory: intact     Treatments:     Modalities:      5 min  Modalities  Modalities Used: Yes  Modality 1: Untimed Hotpack    Therapeutic Exercise:   45 min  Therapeutic Exercise  Therapeutic Exercise Performed: Yes  Therapeutic Exercise Activity 4: scapular mobs x 20 reps each  Therapeutic Exercise Activity 6: forward flexion leans x 15  Therapeutic Exercise Activity 7: table top shoulder flex, abd, and ER x 10 reps each  Therapeutic Exercise Activity 8: ergometer x 6 mins  Therapeutic Exercise Activity 9: overhead pulleys x 15 reps each with flex and scaption    Manual Therapy:     10 min  Manual Therapy  Manual Therapy Performed: Yes  Manual Therapy Activity 1: R  upper trapezius tissue mobilization      Assessment: Improved ease with ROM is noted this a.m./p.m.       Plan: Continue with plan of care 1-2x/wk       Erna Wiseman OT  "

## 2023-12-19 ENCOUNTER — TREATMENT (OUTPATIENT)
Dept: OCCUPATIONAL THERAPY | Facility: CLINIC | Age: 73
End: 2023-12-19
Payer: MEDICARE

## 2023-12-19 DIAGNOSIS — M19.011 PRIMARY OSTEOARTHRITIS, RIGHT SHOULDER: ICD-10-CM

## 2023-12-19 PROCEDURE — 97140 MANUAL THERAPY 1/> REGIONS: CPT | Mod: GO | Performed by: OCCUPATIONAL THERAPIST

## 2023-12-19 PROCEDURE — 97110 THERAPEUTIC EXERCISES: CPT | Mod: GO | Performed by: OCCUPATIONAL THERAPIST

## 2023-12-19 ASSESSMENT — PAIN - FUNCTIONAL ASSESSMENT: PAIN_FUNCTIONAL_ASSESSMENT: 0-10

## 2023-12-19 ASSESSMENT — PAIN SCALES - GENERAL: PAINLEVEL_OUTOF10: 3

## 2023-12-19 NOTE — PROGRESS NOTES
"Occupational Therapy Treatment    Patient Name: Edda Webb  MRN: [id number]  Today's Date: 12/19/2023    Time Calculation  Start Time: 1405  Stop Time: 1503  Time Calculation (min): 58 min  OT Therapeutic Procedures Time Entry  Manual Therapy Time Entry: 8  Therapeutic Exercise Time Entry: 45    Insurance:  Visit number: 6 of 32  Insurance Type: [facility] Medicare     Subjective   Current Problem/Reason for visit:  R RTSR     Referred by: Dr. Bear     Patient reports \"It's stiff and sore today.\"    Performing HEP?: Yes    Pain:  Pain Assessment  Pain Assessment: 0-10  Pain Score: 3  Pain Type: Surgical pain  Pain Location: Shoulder  Pain Orientation: Right    Objective     R shoulder flexion in supine with dowel semaj ther ex: 120 degrees    Physical Observation: No significant abnormalities  Sensory: +Intact     Treatment:    Modalities: Moist heat to R shoulder x 5 minutes    Therapeutic Exercise:  Scapular mobilization elevation/depression, protraction/retraction, CW/CCW x20 reps  Ergometer x6 minutes; 3 FW/3 BW  Overhead pulleys for shoulder flexion and scaption x15 reps each; encouraged prolonged hold at end range  Countertop leans x15 reps  Tabletop towel slides for flexion, abduction, ER x15 reps  Supine dowel semaj ther ex for shoulder flexion x10 reps      Manual: Soft tissue mobilization and trigger point release in conjunction with BioFreeze to R upper trap and medial scapular border and along anterior aspect of R shoulder and along humeral shaft x8 minutes       Post-tx pain: 1/10    Assessment/Plan Patient demonstrates good tolerance for all intervention this date; progressing slowly, but steadily, will continue to advance intervention as indicated/tolerated.      OP EDUCATION:  Education  Individual(s) Educated: Patient  Home Program: AAROM, PROM, Modalities    Goals:  Active       OT Goals       1. Patient will increase R elbow AROM extension to 0 to increase functional use of RUE.       Start:  11/22/23  "   Expected End:  12/05/23            2. Patient will increase R shoulder AAROM flexion to 120 degrees to increase functional use of RUE.       Start:  11/22/23    Expected End:  12/19/23            3. Patient will increase R shoulder AROM flexion/abduction to at least 130 degrees to increase functional use of RUE.       Start:  11/22/23    Expected End:  01/30/24            4. Patient will increase R shoulder AROM ER to 60 degrees to increase functional use of RUE.        Start:  11/22/23    Expected End:  01/30/24            5. Patient will increase R shoulder strength to 4/5 grossly per MMT to increase functional use of RUE.       Start:  11/22/23    Expected End:  03/12/24            6. Patient will increase overall functional independence AEB UEFI score of 70/80 to return to PLOF.       Start:  11/22/23    Expected End:  03/12/24

## 2023-12-26 ENCOUNTER — TREATMENT (OUTPATIENT)
Dept: OCCUPATIONAL THERAPY | Facility: CLINIC | Age: 73
End: 2023-12-26
Payer: MEDICARE

## 2023-12-26 DIAGNOSIS — M19.011 ARTHRITIS OF RIGHT SHOULDER REGION: Primary | ICD-10-CM

## 2023-12-26 DIAGNOSIS — M19.011 PRIMARY OSTEOARTHRITIS OF RIGHT SHOULDER: ICD-10-CM

## 2023-12-26 PROBLEM — M75.31 CALCIFIC TENDINITIS OF RIGHT SHOULDER: Status: RESOLVED | Noted: 2023-08-31 | Resolved: 2023-12-26

## 2023-12-26 PROBLEM — R73.09 ELEVATED GLUCOSE: Status: RESOLVED | Noted: 2023-08-31 | Resolved: 2023-12-26

## 2023-12-26 PROBLEM — K52.9 COLITIS: Status: RESOLVED | Noted: 2023-08-31 | Resolved: 2023-12-26

## 2023-12-26 PROBLEM — L65.9 HAIR LOSS: Status: RESOLVED | Noted: 2023-08-31 | Resolved: 2023-12-26

## 2023-12-26 PROBLEM — R94.39 ABNORMAL STRESS ECHOCARDIOGRAM: Status: RESOLVED | Noted: 2023-08-31 | Resolved: 2023-12-26

## 2023-12-26 PROBLEM — I25.10 CORONARY ARTERY DISEASE INVOLVING NATIVE CORONARY ARTERY OF NATIVE HEART WITHOUT ANGINA PECTORIS: Status: ACTIVE | Noted: 2023-12-26

## 2023-12-26 PROBLEM — J18.9 PNEUMONIA: Status: RESOLVED | Noted: 2023-08-31 | Resolved: 2023-12-26

## 2023-12-26 PROBLEM — R20.0 NUMBNESS: Status: RESOLVED | Noted: 2023-08-31 | Resolved: 2023-12-26

## 2023-12-26 PROBLEM — M80.80XA PATHOLOGICAL FRACTURE DUE TO OSTEOPOROSIS: Status: RESOLVED | Noted: 2019-01-10 | Resolved: 2023-12-26

## 2023-12-26 PROBLEM — R07.9 CHEST PAIN: Status: RESOLVED | Noted: 2023-08-31 | Resolved: 2023-12-26

## 2023-12-26 PROBLEM — S52.509A FRACTURE OF DISTAL END OF RADIUS: Status: RESOLVED | Noted: 2018-11-01 | Resolved: 2023-12-26

## 2023-12-26 PROBLEM — R06.02 CHRONIC SHORTNESS OF BREATH: Status: RESOLVED | Noted: 2023-08-31 | Resolved: 2023-12-26

## 2023-12-26 PROBLEM — E78.00 ELEVATED CHOLESTEROL: Status: RESOLVED | Noted: 2023-08-31 | Resolved: 2023-12-26

## 2023-12-26 PROCEDURE — 97140 MANUAL THERAPY 1/> REGIONS: CPT | Mod: GO | Performed by: OCCUPATIONAL THERAPIST

## 2023-12-26 PROCEDURE — 97110 THERAPEUTIC EXERCISES: CPT | Mod: GO | Performed by: OCCUPATIONAL THERAPIST

## 2023-12-26 ASSESSMENT — PAIN SCALES - GENERAL: PAINLEVEL_OUTOF10: 2

## 2023-12-26 ASSESSMENT — PAIN - FUNCTIONAL ASSESSMENT: PAIN_FUNCTIONAL_ASSESSMENT: 0-10

## 2023-12-26 NOTE — PROGRESS NOTES
"    Occupational Therapy  Occupational Therapy Treatment    Patient Name: Edda Webb  MRN: [id number]  Today's Date: 12/26/2023         Insurance:  Visit number: 7 of 32  Authorization info: $20 co-pay  Insurance Type: [facility] Medicare    General:  Reason for visit: R RTSR  Referred by: Andriy     Current Problem  1. Arthritis of right shoulder region        2. Primary osteoarthritis of right shoulder  Follow Up In Occupational Therapy          Precautions  12 weeks=strengthening        Subjective:   Patient reports \"I feel like I can use it more.\"    Performing HEP?: Yes    Pain  Pain Assessment: 0-10  Pain Score: 2  Pain Type: Chronic pain  Pain Location: Shoulder  Pain Orientation: Right    Objective:     AAROM: R shoulder ER=45 with dowel semaj while sitting.     Physical Observation: SX incisions are intact   Edema: none     Sensory: intact     Treatments:     Modalities:      5 min  Modalities  Modalities Used: Yes  Modality 1: Untimed Hotpack    Therapeutic Exercise:   45min  Therapeutic Exercise  Therapeutic Exercise Performed: Yes  Therapeutic Exercise Activity 1: scapular mobs x 20 reps each  Therapeutic Exercise Activity 2: ergometer x 6 mins  Therapeutic Exercise Activity 3: overhead pulleys x 15 reps each  Therapeutic Exercise Activity 4: supine dowel semaj flexion and add/abd x 10 reps each  Therapeutic Exercise Activity 5: Pendulums x 15 reps each  Therapeutic Exercise Activity 6: dowel semaj ER x 10    Manual Therapy:     10 min  Manual Therapy  Manual Therapy Performed: Yes  Manual Therapy Activity 1: R anterior shoulder capsule        Assessment: Progress is noted with AROM tolerance        Plan: Continue with plan of care 1-2x/wk       Erna Wiseman OT  "

## 2023-12-27 ENCOUNTER — OFFICE VISIT (OUTPATIENT)
Dept: PRIMARY CARE | Facility: CLINIC | Age: 73
End: 2023-12-27
Payer: MEDICARE

## 2023-12-27 VITALS
OXYGEN SATURATION: 98 % | RESPIRATION RATE: 18 BRPM | TEMPERATURE: 97 F | WEIGHT: 194.2 LBS | SYSTOLIC BLOOD PRESSURE: 116 MMHG | HEART RATE: 68 BPM | BODY MASS INDEX: 28.76 KG/M2 | HEIGHT: 69 IN | DIASTOLIC BLOOD PRESSURE: 74 MMHG

## 2023-12-27 DIAGNOSIS — G62.89 OTHER POLYNEUROPATHY: ICD-10-CM

## 2023-12-27 DIAGNOSIS — R91.8 PULMONARY NODULES: ICD-10-CM

## 2023-12-27 DIAGNOSIS — R73.03 PREDIABETES: ICD-10-CM

## 2023-12-27 DIAGNOSIS — K22.70 BARRETT'S ESOPHAGUS WITHOUT DYSPLASIA: ICD-10-CM

## 2023-12-27 DIAGNOSIS — I10 BENIGN ESSENTIAL HYPERTENSION: ICD-10-CM

## 2023-12-27 DIAGNOSIS — Z23 ENCOUNTER FOR IMMUNIZATION: ICD-10-CM

## 2023-12-27 DIAGNOSIS — Z00.00 ROUTINE GENERAL MEDICAL EXAMINATION AT HEALTH CARE FACILITY: Primary | ICD-10-CM

## 2023-12-27 DIAGNOSIS — E88.810 DYSMETABOLIC SYNDROME: ICD-10-CM

## 2023-12-27 DIAGNOSIS — E55.9 VITAMIN D DEFICIENCY: ICD-10-CM

## 2023-12-27 LAB — POC HEMOGLOBIN A1C: 5.5 % (ref 4.2–6.5)

## 2023-12-27 PROCEDURE — 1159F MED LIST DOCD IN RCRD: CPT | Performed by: FAMILY MEDICINE

## 2023-12-27 PROCEDURE — 3074F SYST BP LT 130 MM HG: CPT | Performed by: FAMILY MEDICINE

## 2023-12-27 PROCEDURE — 1170F FXNL STATUS ASSESSED: CPT | Performed by: FAMILY MEDICINE

## 2023-12-27 PROCEDURE — 1126F AMNT PAIN NOTED NONE PRSNT: CPT | Performed by: FAMILY MEDICINE

## 2023-12-27 PROCEDURE — 1036F TOBACCO NON-USER: CPT | Performed by: FAMILY MEDICINE

## 2023-12-27 PROCEDURE — 99215 OFFICE O/P EST HI 40 MIN: CPT | Mod: 25 | Performed by: FAMILY MEDICINE

## 2023-12-27 PROCEDURE — 3078F DIAST BP <80 MM HG: CPT | Performed by: FAMILY MEDICINE

## 2023-12-27 PROCEDURE — G0439 PPPS, SUBSEQ VISIT: HCPCS | Performed by: FAMILY MEDICINE

## 2023-12-27 PROCEDURE — 1160F RVW MEDS BY RX/DR IN RCRD: CPT | Performed by: FAMILY MEDICINE

## 2023-12-27 PROCEDURE — 90662 IIV NO PRSV INCREASED AG IM: CPT | Performed by: FAMILY MEDICINE

## 2023-12-27 ASSESSMENT — ACTIVITIES OF DAILY LIVING (ADL)
EATING: INDEPENDENT
JUDGMENT_ADEQUATE_SAFELY_COMPLETE_DAILY_ACTIVITIES: YES
BATHING: INDEPENDENT
TOILETING: INDEPENDENT
TAKING MEDICATION: INDEPENDENT
GROCERY_SHOPPING: INDEPENDENT
ADEQUATE_TO_COMPLETE_ADL: YES
PILL BOX USED: NO
BATHING: INDEPENDENT
NEEDS ASSISTANCE WITH FOOD: INDEPENDENT
DOING HOUSEWORK: INDEPENDENT
MANAGING_FINANCES: INDEPENDENT
USING TRANSPORTATION: INDEPENDENT
DRESSING: INDEPENDENT
TAKING_MEDICATION: INDEPENDENT
DRESSING: INDEPENDENT
DOING_HOUSEWORK: INDEPENDENT
MANAGING FINANCES: INDEPENDENT
STIL DRIVING: YES
FEEDING: INDEPENDENT
GROCERY SHOPPING: INDEPENDENT
PREPARING MEALS: INDEPENDENT
USING TELEPHONE: INDEPENDENT

## 2023-12-27 ASSESSMENT — ENCOUNTER SYMPTOMS
DEPRESSION: 0
LOSS OF SENSATION IN FEET: 0
OCCASIONAL FEELINGS OF UNSTEADINESS: 0

## 2023-12-27 ASSESSMENT — PATIENT HEALTH QUESTIONNAIRE - PHQ9
SUM OF ALL RESPONSES TO PHQ9 QUESTIONS 1 AND 2: 0
2. FEELING DOWN, DEPRESSED OR HOPELESS: NOT AT ALL
1. LITTLE INTEREST OR PLEASURE IN DOING THINGS: NOT AT ALL

## 2023-12-27 ASSESSMENT — COLUMBIA-SUICIDE SEVERITY RATING SCALE - C-SSRS
1. IN THE PAST MONTH, HAVE YOU WISHED YOU WERE DEAD OR WISHED YOU COULD GO TO SLEEP AND NOT WAKE UP?: NO
6. HAVE YOU EVER DONE ANYTHING, STARTED TO DO ANYTHING, OR PREPARED TO DO ANYTHING TO END YOUR LIFE?: NO
2. HAVE YOU ACTUALLY HAD ANY THOUGHTS OF KILLING YOURSELF?: NO

## 2023-12-27 NOTE — PATIENT INSTRUCTIONS
Here for physical.  Order for blood work prior to next visit.  A1c is 5.5. - normal. Blood pressure well controlled.     For Barretts - referral to Dr. Quintanilla.  Continue pantoprazole.      For shoulder replacement - continue with therapy.     For pulmonary nodules - await CT scan.

## 2023-12-27 NOTE — PROGRESS NOTES
"Subjective   Reason for Visit: Edda Webb is an 73 y.o. female here for a Medicare Wellness visit.     Past Medical, Surgical, and Family History reviewed and updated in chart.    Reviewed all medications by prescribing practitioner or clinical pharmacist (such as prescriptions, OTCs, herbal therapies and supplements) and documented in the medical record.    Here for physical.     Shoulder replacement - doing well.  Doing therapy twice a week, home exercises.  Patient has been sleeping better.  Able to sleep in bed.      GERD:  -F/U: doing well.    -Dysphagia: none   -Hx of EGD: Dr. Quintanilla -2016  -Changes in Bowels: none  -Blood in stool:  none    Neuropathy  -using gabapentin - working well.  Tolerating well.  Symptoms manageable.            Patient Care Team:  Lui Lock DO as PCP - General  Rome Bear MD as Consulting Physician (Orthopaedic Surgery)  Flor Quintanilla MD MPH as Surgeon (Bariatrics)     Review of Systems    Objective   Vitals:  /74   Pulse 68   Temp 36.1 °C (97 °F)   Resp 18   Ht 1.753 m (5' 9\")   Wt 88.1 kg (194 lb 3.2 oz)   SpO2 98%   BMI 28.68 kg/m²       Physical Exam  Vitals reviewed.   Constitutional:       General: She is not in acute distress.  Cardiovascular:      Rate and Rhythm: Normal rate and regular rhythm.   Pulmonary:      Effort: Pulmonary effort is normal.      Breath sounds: No wheezing or rhonchi.   Musculoskeletal:      Right lower leg: No edema.      Left lower leg: No edema.   Lymphadenopathy:      Cervical: No cervical adenopathy.   Neurological:      Mental Status: She is alert.         Assessment/Plan   Problem List Items Addressed This Visit       Mayers's esophagus    Benign essential hypertension    Dysmetabolic syndrome    Peripheral neuropathy     Other Visit Diagnoses       Routine general medical examination at health care facility    -  Primary    Relevant Orders    1 Year Follow Up In Primary Care - Wellness Exam    Prediabetes        " Relevant Orders    POCT glycosylated hemoglobin (Hb A1C) manually resulted (Completed)    Pulmonary nodules              Patient Instructions   Here for physical.  Order for blood work prior to next visit.  A1c is 5.5. - normal. Blood pressure well controlled.     For Barretts - referral to Dr. Quintanilla.  Continue pantoprazole.      For shoulder replacement - continue with therapy.     For pulmonary nodules - await CT scan.       Follow up in 3 months - blood work the week prior.

## 2023-12-28 ENCOUNTER — APPOINTMENT (OUTPATIENT)
Dept: OCCUPATIONAL THERAPY | Facility: CLINIC | Age: 73
End: 2023-12-28
Payer: MEDICARE

## 2023-12-29 ENCOUNTER — HOSPITAL ENCOUNTER (OUTPATIENT)
Dept: RADIOLOGY | Facility: HOSPITAL | Age: 73
Discharge: HOME | End: 2023-12-29
Payer: MEDICARE

## 2023-12-29 DIAGNOSIS — R91.8 OTHER NONSPECIFIC ABNORMAL FINDING OF LUNG FIELD: ICD-10-CM

## 2023-12-29 PROCEDURE — 71250 CT THORAX DX C-: CPT | Performed by: RADIOLOGY

## 2023-12-29 PROCEDURE — 71250 CT THORAX DX C-: CPT

## 2024-01-02 ENCOUNTER — TREATMENT (OUTPATIENT)
Dept: OCCUPATIONAL THERAPY | Facility: CLINIC | Age: 74
End: 2024-01-02
Payer: MEDICARE

## 2024-01-02 DIAGNOSIS — M19.011 PRIMARY OSTEOARTHRITIS OF RIGHT SHOULDER: ICD-10-CM

## 2024-01-02 DIAGNOSIS — M19.011 ARTHRITIS OF RIGHT SHOULDER REGION: Primary | ICD-10-CM

## 2024-01-02 PROCEDURE — 97110 THERAPEUTIC EXERCISES: CPT | Mod: GO | Performed by: OCCUPATIONAL THERAPIST

## 2024-01-02 PROCEDURE — 97140 MANUAL THERAPY 1/> REGIONS: CPT | Mod: GO | Performed by: OCCUPATIONAL THERAPIST

## 2024-01-02 ASSESSMENT — PAIN - FUNCTIONAL ASSESSMENT: PAIN_FUNCTIONAL_ASSESSMENT: 0-10

## 2024-01-02 ASSESSMENT — PAIN SCALES - GENERAL: PAINLEVEL_OUTOF10: 1

## 2024-01-02 NOTE — PROGRESS NOTES
"    Occupational Therapy  Occupational Therapy Treatment    Patient Name: Edda Webb  MRN: [id number]  Today's Date: 1/2/2024         Insurance:  Visit number: 8 of 32  Authorization info: $20 co-pay   Insurance Type: [facility] Medicare    General:  Reason for visit: R RTSR   Referred by: Andriy     Current Problem  1. Arthritis of right shoulder region            Precautions   12 weeks=strenghtening      Subjective:   Patient reports \"I still get achey when I reach across my chest.\"    Performing HEP?: Yes    Pain  Pain Assessment: 0-10  Pain Score: 1  Pain Type: Chronic pain  Pain Location: Shoulder  Pain Orientation: Right    Objective:   AROM: R shoulder flexion=65 following tx.       Physical Observation: intact   Edema: mild     Sensory: intact     Treatments:     Modalities:      5 min  Modalities  Modalities Used: Yes    Therapeutic Exercise:   40 min  Therapeutic Exercise  Therapeutic Exercise Performed: Yes  Therapeutic Exercise Activity 1: scapular mobs x 20 reps each  Therapeutic Exercise Activity 2: ergometer x 6 mins  Therapeutic Exercise Activity 3: overhead pulleys x 20 reps each  Therapeutic Exercise Activity 4: supine dowel semaj exercises x 10 reps each  Therapeutic Exercise Activity 6: dowel semaj ER x 10 reps each  Therapeutic Exercise Activity 7: wall washes x 10 reps each    Manual Therapy:     10 min  Manual Therapy  Manual Therapy Performed: Yes  Manual Therapy Activity 1: R anterior shoulder capsule tissue mobilization    Assessment: Progress is noted with TX tolerance.        Plan: Continue with plan of care 1-2x/wk       Erna Wiseman OT  "

## 2024-01-03 ENCOUNTER — TELEPHONE (OUTPATIENT)
Dept: PRIMARY CARE | Facility: CLINIC | Age: 74
End: 2024-01-03
Payer: MEDICARE

## 2024-01-03 NOTE — TELEPHONE ENCOUNTER
Patient calling stating she has osteopenia and wants to know if she should take otc calcium? Please advise.TM

## 2024-01-05 ENCOUNTER — TREATMENT (OUTPATIENT)
Dept: OCCUPATIONAL THERAPY | Facility: CLINIC | Age: 74
End: 2024-01-05
Payer: MEDICARE

## 2024-01-05 DIAGNOSIS — M19.011 PRIMARY OSTEOARTHRITIS OF RIGHT SHOULDER: ICD-10-CM

## 2024-01-05 PROCEDURE — 97110 THERAPEUTIC EXERCISES: CPT | Mod: GO | Performed by: OCCUPATIONAL THERAPIST

## 2024-01-05 PROCEDURE — 97140 MANUAL THERAPY 1/> REGIONS: CPT | Mod: GO | Performed by: OCCUPATIONAL THERAPIST

## 2024-01-05 ASSESSMENT — PAIN - FUNCTIONAL ASSESSMENT: PAIN_FUNCTIONAL_ASSESSMENT: 0-10

## 2024-01-05 ASSESSMENT — PAIN SCALES - GENERAL: PAINLEVEL_OUTOF10: 1

## 2024-01-05 NOTE — PROGRESS NOTES
"    Occupational Therapy  Occupational Therapy Orthopedic Progress Note    Patient Name: Edda Webb  MRN: [id number]  Today's Date: 1/5/2024       Insurance:  Visit number: 10 of 32 including evaluation   Authorization info: $20 co-pay  Insurance Type: [facility]       General:  Reason for visit: R RTSR  Referred by: Andriy     Current Problem  1. Primary osteoarthritis of right shoulder  Follow Up In Occupational Therapy          Precautions: 12 weeks=shoulder strengthening          Subjective:  Patient reports \"The laying down exercises make me more sore.\"    Current Condition:  Better    Pain:  Pain Assessment: 0-10  Pain Score: 1  Pain Type: Chronic pain  Pain Location: Shoulder  Pain Orientation: Right    Aggravating Factors: above shoulder height activity   Relieving Factors:  Rest, Heat, and Stretching     Self Reported Function (0-100%) = 60%  - 100% being back to PLOF    Objective:    AROM: R shoulder flexion=85, abduction=85, and ER=50    Outcome Measures: Updated 1/5/2024  UEFI: 27/80    EDUCATION: home exercise program, plan of care, activity modifications, pain management, and injury pathology       Goals: Updated 1/5/2024      Plan of care was developed with input and agreement by the patient    Treatments:     Modalities:      5 min  Modalities  Modalities Used: Yes  Modality 1: Untimed Hotpack    Therapeutic Exercise:    40 min  Therapeutic Exercise  Therapeutic Exercise Performed: Yes  Therapeutic Exercise Activity 1: scapular mobs x 20 reps each  Therapeutic Exercise Activity 2: ergometer x 6 mins  Therapeutic Exercise Activity 3: overhead pulleys x 20 reps each  Therapeutic Exercise Activity 4: supine dowel semaj exercises x 10 reps each  Therapeutic Exercise Activity 6: dowel semaj ER x 15 reps each  Therapeutic Exercise Activity 7: wall washes x 10 reps each    Manual Therapy:     10 min  Manual Therapy  Manual Therapy Performed: Yes  Manual Therapy Activity 1: R anterior " shoulder        Assessment: Progress is noted in all areas of AROM, pain tolerance, and independence with daily function.        Plan:      Planned Interventions include: therapeutic exercise, therapeutic activity, self-care home management, manual therapy, therapeutic activities, gait training, neuromuscular coordination, vasopneumatic, dry needling, aquatic therapy, electric stimulation, fluidotherapy, ultrasound, kinesiotaping, orthosis fabrication, wound care  Frequency: 1-2 x Week  Duration: 8 Weeks    Erna Wiseman OT

## 2024-01-09 ENCOUNTER — OFFICE VISIT (OUTPATIENT)
Dept: ORTHOPEDIC SURGERY | Facility: CLINIC | Age: 74
End: 2024-01-09
Payer: MEDICARE

## 2024-01-09 ENCOUNTER — TREATMENT (OUTPATIENT)
Dept: OCCUPATIONAL THERAPY | Facility: CLINIC | Age: 74
End: 2024-01-09
Payer: MEDICARE

## 2024-01-09 ENCOUNTER — ANCILLARY PROCEDURE (OUTPATIENT)
Dept: RADIOLOGY | Facility: CLINIC | Age: 74
End: 2024-01-09
Payer: MEDICARE

## 2024-01-09 DIAGNOSIS — M19.011 ARTHRITIS OF RIGHT SHOULDER REGION: Primary | ICD-10-CM

## 2024-01-09 DIAGNOSIS — M19.011 OSTEOARTHRITIS OF RIGHT SHOULDER, UNSPECIFIED OSTEOARTHRITIS TYPE: ICD-10-CM

## 2024-01-09 DIAGNOSIS — M19.011 PRIMARY OSTEOARTHRITIS OF RIGHT SHOULDER: ICD-10-CM

## 2024-01-09 PROCEDURE — 1159F MED LIST DOCD IN RCRD: CPT | Performed by: ORTHOPAEDIC SURGERY

## 2024-01-09 PROCEDURE — 73030 X-RAY EXAM OF SHOULDER: CPT | Mod: RIGHT SIDE | Performed by: RADIOLOGY

## 2024-01-09 PROCEDURE — 1160F RVW MEDS BY RX/DR IN RCRD: CPT | Performed by: ORTHOPAEDIC SURGERY

## 2024-01-09 PROCEDURE — 97140 MANUAL THERAPY 1/> REGIONS: CPT | Mod: GO | Performed by: OCCUPATIONAL THERAPIST

## 2024-01-09 PROCEDURE — 97110 THERAPEUTIC EXERCISES: CPT | Mod: GO | Performed by: OCCUPATIONAL THERAPIST

## 2024-01-09 PROCEDURE — 1126F AMNT PAIN NOTED NONE PRSNT: CPT | Performed by: ORTHOPAEDIC SURGERY

## 2024-01-09 PROCEDURE — 1036F TOBACCO NON-USER: CPT | Performed by: ORTHOPAEDIC SURGERY

## 2024-01-09 PROCEDURE — 99024 POSTOP FOLLOW-UP VISIT: CPT | Performed by: ORTHOPAEDIC SURGERY

## 2024-01-09 PROCEDURE — 73030 X-RAY EXAM OF SHOULDER: CPT | Mod: RT

## 2024-01-09 ASSESSMENT — PAIN - FUNCTIONAL ASSESSMENT
PAIN_FUNCTIONAL_ASSESSMENT: 0-10
PAIN_FUNCTIONAL_ASSESSMENT: 0-10

## 2024-01-09 ASSESSMENT — PAIN SCALES - GENERAL
PAINLEVEL_OUTOF10: 2
PAINLEVEL_OUTOF10: 2

## 2024-01-09 NOTE — PROGRESS NOTES
"    Occupational Therapy  Occupational Therapy Treatment    Patient Name: Edda Webb  MRN: [id number]  Today's Date: 1/9/2024         Insurance:  Visit number: 10 of 32  Insurance Type: [facility]     General:  Reason for visit: R RTSR   Referred by: Andriy    Current Problem  1. Arthritis of right shoulder region        2. Primary osteoarthritis of right shoulder  Follow Up In Occupational Therapy          Precautions   12 weeks=strengthening       Subjective:   Patient reports \"I can tell that I am moving more.\"    Performing HEP?: Yes    Pain  Pain Assessment: 0-10  Pain Score: 2  Pain Type: Chronic pain  Pain Location: Shoulder  Pain Orientation: Right    Objective:     AROM: R shoulder [id number] while supine     Physical Observation: intact   Edema: none     Sensory: intact   Numbness/Tingling: none     Treatments:     Modalities:      5 min  Modalities  Modalities Used: Yes  Modality 1: Untimed Hotpack    Therapeutic Exercise:   45 min  Therapeutic Exercise  Therapeutic Exercise Performed: Yes  Therapeutic Exercise Activity 1: scapular mobs x 20 reps  Therapeutic Exercise Activity 2: ergometer x 6 mins  Therapeutic Exercise Activity 3: overhead pulleys x 15 reps each  Therapeutic Exercise Activity 4: supine dowel semaj exercises x 10 reps  Therapeutic Exercise Activity 5: supine single flexion and sidelying abd and ER x 10 reps ech  Therapeutic Exercise Activity 7: Wall washes x 15 reps each    Manual Therapy:     10 min  Manual Therapy  Manual Therapy Performed: Yes  Manual Therapy Activity 1: R anterior shoulder tissue mobilization x 10 reps      Assessment: Improvement is noted with AROM while supine.        Plan: Continue with plan of care 1-2x/wk       Erna Wiseman OT  "

## 2024-01-09 NOTE — PROGRESS NOTES
Reason for Appointment  Chief Complaint   Patient presents with    Right Shoulder - Follow-up     History of Present Illness  Patient is here today almost 2 months s/p a right reverse shoulder replacement on 11/15/23. Patient is doing well overall. X-rays show excellent alignment and no loosening. She is progressing slowly and up to 90 degrees of forward flexion. Incision looks excellent. Deltoid is functional. Follow-up in 8 weeks with repeat X-ray.     Assessment   Encounter Diagnosis   Name Primary?    Osteoarthritis of right shoulder, unspecified osteoarthritis type      I, Twila Davis, attest that this documentation has been prepared under the direction and in the presence of Rome Bear MD. By signing below, I, Rome Bear MD, personally performed the services described in this documentation. All medical record entries made by the scribe were at my direction and in my presence. I have reviewed the chart and agree that the record reflects my personal performance and is accurate and complete. 01/09/24

## 2024-01-12 ENCOUNTER — TREATMENT (OUTPATIENT)
Dept: OCCUPATIONAL THERAPY | Facility: CLINIC | Age: 74
End: 2024-01-12
Payer: MEDICARE

## 2024-01-12 DIAGNOSIS — M19.011 PRIMARY OSTEOARTHRITIS OF RIGHT SHOULDER: ICD-10-CM

## 2024-01-12 DIAGNOSIS — M19.011 ARTHRITIS OF RIGHT SHOULDER REGION: Primary | ICD-10-CM

## 2024-01-12 PROCEDURE — 97140 MANUAL THERAPY 1/> REGIONS: CPT | Mod: GO | Performed by: OCCUPATIONAL THERAPIST

## 2024-01-12 PROCEDURE — 97110 THERAPEUTIC EXERCISES: CPT | Mod: GO | Performed by: OCCUPATIONAL THERAPIST

## 2024-01-12 ASSESSMENT — PAIN - FUNCTIONAL ASSESSMENT: PAIN_FUNCTIONAL_ASSESSMENT: 0-10

## 2024-01-12 ASSESSMENT — PAIN SCALES - GENERAL: PAINLEVEL_OUTOF10: 0 - NO PAIN

## 2024-01-12 NOTE — PROGRESS NOTES
"    Occupational Therapy  Occupational Therapy Treatment    Patient Name: Edda Webb  MRN: [id number]  Today's Date: 1/12/2024  Time Calculation  Start Time: 1030  Stop Time: 1125  Time Calculation (min): 55 min      Insurance:  Visit number: 11 of 32    Insurance Type: Medicare     General:  Reason for visit: R RTSR  Referred by: Andriy     Current Problem  1. Arthritis of right shoulder region        2. Primary osteoarthritis of right shoulder  Follow Up In Occupational Therapy          Precautions   12 weeks=strengthening       Subjective:   Patient reports \"I am really working hard on my exercises at home.\"    Performing HEP?: Yes    Pain  Pain Assessment: 0-10  Pain Score: 0 - No pain    Objective:     AROM: R shoulder abduction with [id number]    Physical Observation: intact   Edema: none    Sensory: no abnormalities   Numbness/Tingling: none    Treatments:     Modalities:      5 min  Modalities  Modalities Used: Yes  Modality 1: Untimed Hotpack    Therapeutic Exercise:   45 min  Therapeutic Exercise  Therapeutic Exercise Performed: Yes  Therapeutic Exercise Activity 1: scapular mob x 10 reps each  Therapeutic Exercise Activity 2: ergometer x 6 mins  Therapeutic Exercise Activity 3: overhead pulleys x 15 reps each  Therapeutic Exercise Activity 4: supine dowel semaj exercises x 10 reps each  Therapeutic Exercise Activity 5: supine single flexion, sidelying abd, and sidelying ER x 10 reps each  Therapeutic Exercise Activity 7: Wall washes x 15 reps each    Manual Therapy:     10 min  Manual Therapy  Manual Therapy Performed: Yes        Assessment: Good tolerance with exercises with increased AROM noted.        Plan: Continue with plan of care 1-2x/wk.       Erna Wiseman OT  "

## 2024-01-15 ENCOUNTER — APPOINTMENT (OUTPATIENT)
Dept: OCCUPATIONAL THERAPY | Facility: CLINIC | Age: 74
End: 2024-01-15
Payer: MEDICARE

## 2024-01-16 ENCOUNTER — APPOINTMENT (OUTPATIENT)
Dept: PULMONOLOGY | Facility: HOSPITAL | Age: 74
End: 2024-01-16
Payer: MEDICARE

## 2024-01-18 ENCOUNTER — TREATMENT (OUTPATIENT)
Dept: OCCUPATIONAL THERAPY | Facility: CLINIC | Age: 74
End: 2024-01-18
Payer: MEDICARE

## 2024-01-18 ENCOUNTER — TELEPHONE (OUTPATIENT)
Dept: ORTHOPEDIC SURGERY | Facility: CLINIC | Age: 74
End: 2024-01-18

## 2024-01-18 DIAGNOSIS — M19.011 PRIMARY OSTEOARTHRITIS OF RIGHT SHOULDER: ICD-10-CM

## 2024-01-18 DIAGNOSIS — M19.011 ARTHRITIS OF RIGHT SHOULDER REGION: Primary | ICD-10-CM

## 2024-01-18 PROCEDURE — 97110 THERAPEUTIC EXERCISES: CPT | Mod: GO | Performed by: OCCUPATIONAL THERAPIST

## 2024-01-18 PROCEDURE — 97140 MANUAL THERAPY 1/> REGIONS: CPT | Mod: GO | Performed by: OCCUPATIONAL THERAPIST

## 2024-01-18 ASSESSMENT — PAIN SCALES - GENERAL: PAINLEVEL_OUTOF10: 0 - NO PAIN

## 2024-01-18 ASSESSMENT — PAIN - FUNCTIONAL ASSESSMENT: PAIN_FUNCTIONAL_ASSESSMENT: 0-10

## 2024-01-18 NOTE — TELEPHONE ENCOUNTER
Please call her, She can have dental procedures starting over the next month and she should get antibiotics prior to dental procedure, either dentist or we can give her antibiotics

## 2024-01-18 NOTE — TELEPHONE ENCOUNTER
Patient was here this morning. She had reverse shoulder surgery with Dr. Bear on 11/15/23. She is asking when she should be able to get a dental procedure and if she needs antibiotics before every procedure.

## 2024-01-18 NOTE — PROGRESS NOTES
"    Occupational Therapy  Occupational Therapy Treatment    Patient Name: Edda Webb  MRN: [id number]  Today's Date: 1/18/2024         Insurance:  Visit number: 12 of 32  Insurance Type: [employer] Medicare     General:  Reason for visit: R RTSR   Referred by: Andriy     Current Problem  1. Arthritis of right shoulder region        2. Primary osteoarthritis of right shoulder  Follow Up In Occupational Therapy          Precautions   12 weeks=strengthening      Subjective:   Patient reports \"I was so sick for almost a week that I didn't have much time to do my exercises.\"    Performing HEP?: Yes    Pain  Pain Assessment: 0-10  Pain Score: 0 - No pain    Objective:     AROM: R shoulder [id number] with sidelying     Physical Observation: intact   Edema: none     Sensory: intact   Numbness/Tingling: none    Treatments:     Modalities:      5 min  Modalities  Modalities Used: Yes  Modality 1: Untimed Hotpack    Therapeutic Exercise:   45 min  Therapeutic Exercise  Therapeutic Exercise Performed: Yes  Therapeutic Exercise Activity 1: scapular mobs  Therapeutic Exercise Activity 2: ergometer x 6 mins  Therapeutic Exercise Activity 3: overhead pulleys x 15 reps each  Therapeutic Exercise Activity 4: supine dowel semaj exercises x 10  Therapeutic Exercise Activity 5: supine single exercises x 15 reps each  Therapeutic Exercise Activity 7: Wall washes x 15 reps each    Manual Therapy:     10 min  Manual Therapy  Manual Therapy Performed: Yes  Manual Therapy Activity 1: R anterior shoulder capsule soft tissue mobilization.      Assessment: Improvement is noted with AROM of R shoulder.        Plan: Continue with plan of care 1-2x/wk.       Erna Wiseman OT  "

## 2024-01-22 ENCOUNTER — TREATMENT (OUTPATIENT)
Dept: OCCUPATIONAL THERAPY | Facility: CLINIC | Age: 74
End: 2024-01-22
Payer: MEDICARE

## 2024-01-22 DIAGNOSIS — M19.011 ARTHRITIS OF RIGHT SHOULDER REGION: Primary | ICD-10-CM

## 2024-01-22 DIAGNOSIS — M19.011 PRIMARY OSTEOARTHRITIS OF RIGHT SHOULDER: ICD-10-CM

## 2024-01-22 PROCEDURE — 97140 MANUAL THERAPY 1/> REGIONS: CPT | Mod: GO | Performed by: OCCUPATIONAL THERAPIST

## 2024-01-22 PROCEDURE — 97110 THERAPEUTIC EXERCISES: CPT | Mod: GO | Performed by: OCCUPATIONAL THERAPIST

## 2024-01-22 ASSESSMENT — PAIN SCALES - GENERAL: PAINLEVEL_OUTOF10: 0 - NO PAIN

## 2024-01-22 ASSESSMENT — PAIN - FUNCTIONAL ASSESSMENT: PAIN_FUNCTIONAL_ASSESSMENT: 0-10

## 2024-01-22 NOTE — PROGRESS NOTES
"Counseling:  The patient was counseled regarding diagnostic results, instructions for management, risk factor reductions, prognosis, patient and family education, impressions, risks and benefits of treatment options and importance of compliance with treatment.      Chief Complaint:   The patient presents today for 6-month followup of CAD and syncope/presumed heart block.      History Of Present Illness:    Edda Webb is a 74 year old female patient who presents today in the company of her  for 6-month followup of CAD and syncope/presumed heart block. Her PMH is significant for situational syncope (presumed heart block) s/p pacemaker insertion approximately 21 years ago, GERD s/p Nissen fundoplication and subsequent revision, Mayers's esophagus, HTN, COPD, dysmetabolic syndrome, hyperlipidemia, and peripheral neuropathy. Over the past 6 months, the patient states that she has done well from a cardiac standpoint. She denies any CP, chest discomfort or SOB. BP has been stable. The patient is compliant with her prescribed medications.     Last Recorded Vitals:  Vitals:    01/29/24 1001   BP: 128/70   BP Location: Left arm   Pulse: 64   Weight: 86.2 kg (190 lb)   Height: 1.753 m (5' 9\")       Past Surgical History:  She has a past surgical history that includes Hysterectomy (02/02/2016); pacemaker placement (Left); Laparoscopic Nissen fundoplication; Wrist fracture surgery (Left); Appendectomy; Breast cyst excision (Left); and Cardiac catheterization.      Social History:  She reports that she has quit smoking. Her smoking use included cigarettes. She has a 43.00 pack-year smoking history. She has never used smokeless tobacco. She reports current alcohol use of about 17.0 standard drinks of alcohol per week. She reports that she does not use drugs.    Family History:  Family History   Problem Relation Name Age of Onset    Heart disease Mother      Hypertension Mother      Other (cardiac disorder) Mother      " Diabetes Mother      Lung cancer Father      Hypertension Sister      Diabetes Sister      Cancer Sister      Cancer Brother      Breast cancer Sibling          Allergies:  Codeine    Outpatient Medications:  Current Outpatient Medications   Medication Instructions    acetaminophen (TYLENOL 8 HOUR) 1,300 mg, oral, As needed, Do not crush, chew, or split.    albuterol (Proventil HFA) 90 mcg/actuation inhaler 2 puffs, Every 6 hours PRN    aspirin 81 mg, oral, Daily    atorvastatin (LIPITOR) 10 mg, oral, Daily    biotin 5 mg capsule oral, Every 24 hours    DOCOSAHEXAENOIC ACID ORAL oral, Daily RT    doxycycline (Monodox) 100 mg capsule oral    gabapentin (NEURONTIN) 600 mg, oral, 3 times daily, One tab at BF and Lunch and 2 at HS    lisinopril 10 mg, oral, Daily    loratadine (CLARITIN) 10 mg, oral, Daily    melatonin 3 mg, oral, Daily PRN    multivit-minerals/folic acid (CENTRUM ADULT 50 PLUS ORAL) 1 tablet, oral, Daily    naloxone (NARCAN) 0.2 mg, intravenous    omega 3-dha-epa-fish oil (Fish OiL) 1,000 mg (120 mg-180 mg) capsule 1 capsule, oral, Daily    oxyCODONE (ROXICODONE) 10 mg, oral, Every 4 hours PRN    pantoprazole (PROTONIX) 40 mg, oral, Daily    PAPAYA ENZYME ORAL 2 tablets, oral, 2 times daily    simvastatin (ZOCOR) 20 mg, oral, Nightly    triamcinolone (Kenalog) 0.1 % cream 1 Application, Topical, 2 times daily PRN, Apply to affected area      Review of Systems   All other systems reviewed and are negative.     Physical Exam:  Constitutional:       Appearance: Healthy appearance. Not in distress.   Neck:      Vascular: No JVR. JVD normal.   Pulmonary:      Effort: Pulmonary effort is normal.      Breath sounds: Normal breath sounds. No wheezing. No rhonchi. No rales.   Chest:      Chest wall: Not tender to palpatation.   Cardiovascular:      PMI at left midclavicular line. Normal rate. Regular rhythm. Normal S1. Normal S2.       Murmurs: There is no murmur.      No gallop.  No click. No rub.   Pulses:      Intact distal pulses.   Edema:     Peripheral edema absent.   Abdominal:      General: Bowel sounds are normal.      Palpations: Abdomen is soft.      Tenderness: There is no abdominal tenderness.   Musculoskeletal: Normal range of motion.         General: No tenderness. Skin:     General: Skin is warm and dry.   Neurological:      General: No focal deficit present.      Mental Status: Alert and oriented to person, place and time.          Last Labs:  CBC -  Lab Results   Component Value Date    WBC 8.0 11/16/2023    HGB 10.8 (L) 11/16/2023    HCT 33.7 (L) 11/16/2023    MCV 92 11/16/2023     11/16/2023       CMP -  Lab Results   Component Value Date    CALCIUM 8.7 11/16/2023    PROT 7.0 11/08/2023    ALBUMIN 4.5 11/08/2023    AST 19 11/08/2023    ALT 26 11/08/2023    ALKPHOS 51 11/08/2023    BILITOT 0.3 11/08/2023       LIPID PANEL -   Lab Results   Component Value Date    CHOL 163 12/05/2022    TRIG 120 12/05/2022    HDL 70.0 12/05/2022    CHHDL 2.3 12/05/2022    CHHDL 2.4 11/05/2021    LDLF 69 12/05/2022    VLDL 24 12/05/2022       RENAL FUNCTION PANEL -   Lab Results   Component Value Date    GLUCOSE 106 (H) 11/16/2023     11/16/2023    K 4.1 11/16/2023     11/16/2023    CO2 25 11/16/2023    ANIONGAP 12 11/16/2023    BUN 19 11/16/2023    CREATININE 0.81 11/16/2023    CALCIUM 8.7 11/16/2023    ALBUMIN 4.5 11/08/2023        Lab Results   Component Value Date    HGBA1C 5.5 12/27/2023       Last Cardiology Tests:  06/22/2023 - Cardiac Catheterization (LH)  1. Non-obstructive coronary artery disease.  2. LVEDP Normal of 13mmHg.    06/05/2023 - Exercise Stress Echo  1. No ECG changes from baseline.  2. Adequate level of stress achieved.  3. At peak, there are stress-induced regional wall motion abnormalities.  4. Stage 2: Impost: Mid and apical anterior septum and mid and apical inferior septum are abnormal.  5. Abnormal with stress induced wall motion abnormalities seen in the septal wall on peak  stress imaging concerning for ischemia.     05/19/2023 - TTE  Left ventricular systolic function is normal with a 65-70% estimated ejection fraction.     Lab review: I have personally reviewed the laboratory result(s).    Assessment/Plan   1) CAD  On ASA 81 mg daily, lisinopril 10 mg daily, simvastatin 20 mg daily   Lipid panel 12/05/2022 with LDL of 69  TTE 05/19/2023 with LVEF 65-70%   Stress testing 06/05/2023 abnormal with stress-induced regional wall motion abnormalities concerning for ischemia   [facility] 06/22/2023 with non-obstructive coronary artery disease  Doing well - denies CP, chest discomfort/pressure or SOB  F/U 1 year      2) Syncope (Presumed Heart Block) s/p PPM  Followed by [facility]  F/U 1 year      3) Hypertension  Stable  On lisinopril 10 mg daily   F/U 1 year       Scribe Attestation  By signing my name below, I, Malika Goodman, Scribe   attest that this documentation has been prepared under the direction and in the presence of Bandar Valdes MD.

## 2024-01-22 NOTE — PROGRESS NOTES
"    Occupational Therapy  Occupational Therapy Treatment    Patient Name: Edda Webb  MRN: [id number]  Today's Date: 1/22/2024         Insurance:  Visit number: 13 of 32  Insurance Type: [employer] Medicare     General:  Reason for visit: R RTSR  Referred by: Andriy     Current Problem  1. Arthritis of right shoulder region        2. Primary osteoarthritis of right shoulder  Follow Up In Occupational Therapy          Precautions   12 weeks=strengthening       Subjective:   Patient reports \"It always hurts in the same spot.\"    Performing HEP?: Yes    Pain  Pain Assessment: 0-10  Pain Score: 0 - No pain    Objective:     AAROM: R shoulder [id number] with dowel semaj.     Physical Observation: intact   Edema: none    Sensory: intact   Numbness/Tingling: none     Treatments:     Modalities:      5 min  Modalities  Modalities Used: Yes  Modality 1: Untimed Hotpack    Therapeutic Exercise:   45 min  Therapeutic Exercise  Therapeutic Exercise Performed: Yes  Therapeutic Exercise Activity 2: ergometer x 6 mins  Therapeutic Exercise Activity 3: overhead pulleys x 20 reps each  Therapeutic Exercise Activity 4: supine dowel semaj exercises x 10 reps each  Therapeutic Exercise Activity 5: supine single exercises x 15 reps each  Therapeutic Exercise Activity 6: wall washes x 15 reps each    Manual Therapy:     10 min  Manual Therapy  Manual Therapy Performed: Yes  Manual Therapy Activity 1: R anterior shoulder capsule tissue mobilization      Assessment: Progress is noted with ease of all exercises completion.       Plan: Continue with plan of care 1-2x/wk        Erna Wiseman OT  "

## 2024-01-23 ENCOUNTER — HOSPITAL ENCOUNTER (OUTPATIENT)
Dept: CARDIOLOGY | Facility: HOSPITAL | Age: 74
Discharge: HOME | End: 2024-01-23
Payer: MEDICARE

## 2024-01-23 DIAGNOSIS — R55 SYNCOPE AND COLLAPSE: Primary | ICD-10-CM

## 2024-01-23 DIAGNOSIS — R55 SYNCOPE AND COLLAPSE: ICD-10-CM

## 2024-01-23 DIAGNOSIS — Z95.0 PRESENCE OF CARDIAC PACEMAKER: ICD-10-CM

## 2024-01-23 PROCEDURE — 93296 REM INTERROG EVL PM/IDS: CPT

## 2024-01-23 PROCEDURE — 93294 REM INTERROG EVL PM/LDLS PM: CPT | Performed by: INTERNAL MEDICINE

## 2024-01-25 ENCOUNTER — APPOINTMENT (OUTPATIENT)
Dept: OCCUPATIONAL THERAPY | Facility: CLINIC | Age: 74
End: 2024-01-25
Payer: MEDICARE

## 2024-01-25 RX ORDER — ATORVASTATIN CALCIUM 20 MG/1
10 TABLET, FILM COATED ORAL DAILY
COMMUNITY
Start: 2023-11-15 | End: 2024-01-29 | Stop reason: WASHOUT

## 2024-01-25 RX ORDER — OXYCODONE HYDROCHLORIDE 5 MG/1
10 TABLET ORAL EVERY 4 HOURS PRN
COMMUNITY
Start: 2023-11-15 | End: 2024-04-03 | Stop reason: ALTCHOICE

## 2024-01-25 RX ORDER — ELECTROLYTES/DEXTROSE
SOLUTION, ORAL ORAL EVERY 24 HOURS
COMMUNITY
End: 2024-04-03 | Stop reason: ALTCHOICE

## 2024-01-25 RX ORDER — NALOXONE HYDROCHLORIDE 0.4 MG/ML
0.2 INJECTION, SOLUTION INTRAMUSCULAR; INTRAVENOUS; SUBCUTANEOUS
COMMUNITY
Start: 2023-11-15 | End: 2024-04-03 | Stop reason: ALTCHOICE

## 2024-01-25 RX ORDER — TALC
3 POWDER (GRAM) TOPICAL DAILY PRN
COMMUNITY
Start: 2023-11-15 | End: 2024-04-03 | Stop reason: ALTCHOICE

## 2024-01-25 RX ORDER — DOXYCYCLINE 100 MG/1
CAPSULE ORAL
COMMUNITY
Start: 2023-08-18 | End: 2024-04-03 | Stop reason: ALTCHOICE

## 2024-01-29 ENCOUNTER — OFFICE VISIT (OUTPATIENT)
Dept: CARDIOLOGY | Facility: CLINIC | Age: 74
End: 2024-01-29
Payer: MEDICARE

## 2024-01-29 VITALS
SYSTOLIC BLOOD PRESSURE: 128 MMHG | DIASTOLIC BLOOD PRESSURE: 70 MMHG | BODY MASS INDEX: 28.14 KG/M2 | WEIGHT: 190 LBS | HEART RATE: 64 BPM | HEIGHT: 69 IN

## 2024-01-29 DIAGNOSIS — I10 BENIGN ESSENTIAL HYPERTENSION: ICD-10-CM

## 2024-01-29 DIAGNOSIS — Z95.0 CARDIAC PACEMAKER: Primary | ICD-10-CM

## 2024-01-29 PROCEDURE — 99213 OFFICE O/P EST LOW 20 MIN: CPT | Performed by: INTERNAL MEDICINE

## 2024-01-29 PROCEDURE — 1126F AMNT PAIN NOTED NONE PRSNT: CPT | Performed by: INTERNAL MEDICINE

## 2024-01-29 PROCEDURE — 3078F DIAST BP <80 MM HG: CPT | Performed by: INTERNAL MEDICINE

## 2024-01-29 PROCEDURE — 1159F MED LIST DOCD IN RCRD: CPT | Performed by: INTERNAL MEDICINE

## 2024-01-29 PROCEDURE — 1160F RVW MEDS BY RX/DR IN RCRD: CPT | Performed by: INTERNAL MEDICINE

## 2024-01-29 PROCEDURE — 1036F TOBACCO NON-USER: CPT | Performed by: INTERNAL MEDICINE

## 2024-01-29 PROCEDURE — 3074F SYST BP LT 130 MM HG: CPT | Performed by: INTERNAL MEDICINE

## 2024-01-29 ASSESSMENT — ENCOUNTER SYMPTOMS
OCCASIONAL FEELINGS OF UNSTEADINESS: 0
LOSS OF SENSATION IN FEET: 1
DEPRESSION: 0

## 2024-01-29 NOTE — PATIENT INSTRUCTIONS
Continue all current medications as prescribed.   Followup with Dr. Valdes in 1 year, sooner should any issues or concerns arise before then.     If you have any questions or cardiac concerns, please call our office at [phone].

## 2024-01-30 ENCOUNTER — TREATMENT (OUTPATIENT)
Dept: OCCUPATIONAL THERAPY | Facility: CLINIC | Age: 74
End: 2024-01-30
Payer: MEDICARE

## 2024-01-30 DIAGNOSIS — M19.011 ARTHRITIS OF RIGHT SHOULDER REGION: Primary | ICD-10-CM

## 2024-01-30 DIAGNOSIS — M19.011 PRIMARY OSTEOARTHRITIS OF RIGHT SHOULDER: ICD-10-CM

## 2024-01-30 PROCEDURE — 97110 THERAPEUTIC EXERCISES: CPT | Mod: GO | Performed by: OCCUPATIONAL THERAPIST

## 2024-01-30 PROCEDURE — 97140 MANUAL THERAPY 1/> REGIONS: CPT | Mod: GO | Performed by: OCCUPATIONAL THERAPIST

## 2024-01-30 ASSESSMENT — PAIN SCALES - GENERAL: PAINLEVEL_OUTOF10: 0 - NO PAIN

## 2024-01-30 ASSESSMENT — PAIN - FUNCTIONAL ASSESSMENT: PAIN_FUNCTIONAL_ASSESSMENT: 0-10

## 2024-01-30 NOTE — PROGRESS NOTES
"    Occupational Therapy  Occupational Therapy Treatment    Patient Name: Edda Webb  MRN: [id number]  Today's Date: 1/30/2024         Insurance:  Visit number: 14 of 32  Insurance Type: [employer] Medicare    General:  Reason for visit: R RTSR   Referred by: Andriy     Current Problem  1. Arthritis of right shoulder region        2. Primary osteoarthritis of right shoulder  Follow Up In Occupational Therapy          Precautions   12 weeks=strengthening      Subjective:   Patient reports \"I get so tired just lifting it up.\"    Performing HEP?: Yes    Pain  Pain Assessment: 0-10  Pain Score: 0 - No pain    Objective:     Patient is 11 weeks post op    Physical Observation: no abnormalities are noted   Edema: mild     Sensory: intact   Numbness/Tingling: intact     Treatments:     Modalities:      5 min  Modalities  Modalities Used: Yes  Modality 1: Untimed Hotpack    Therapeutic Exercise:   45 min  Therapeutic Exercise  Therapeutic Exercise Performed: Yes  Therapeutic Exercise Activity 2: ergometer x 6 mins  Therapeutic Exercise Activity 3: overhead pulleys x 20 reps each  Therapeutic Exercise Activity 4: supine single exercises x reps each  Therapeutic Exercise Activity 5: wall washes x 15 reps each  Therapeutic Exercise Activity 6: cone reaching x 6 read from waist to chest height    Manual Therapy:     10 min  Manual Therapy  Manual Therapy Performed: Yes  Manual Therapy Activity 1: R anterior shoulder capsule tissue mobilization      Assessment: Progress is noted with against gravity exercises.       Plan: Continue with plan of care.       Erna Wiseman OT  "

## 2024-02-02 ENCOUNTER — TREATMENT (OUTPATIENT)
Dept: OCCUPATIONAL THERAPY | Facility: CLINIC | Age: 74
End: 2024-02-02
Payer: MEDICARE

## 2024-02-02 DIAGNOSIS — M19.011 ARTHRITIS OF RIGHT SHOULDER REGION: Primary | ICD-10-CM

## 2024-02-02 PROCEDURE — 97110 THERAPEUTIC EXERCISES: CPT | Mod: GO | Performed by: OCCUPATIONAL THERAPIST

## 2024-02-02 PROCEDURE — 97140 MANUAL THERAPY 1/> REGIONS: CPT | Mod: GO | Performed by: OCCUPATIONAL THERAPIST

## 2024-02-02 ASSESSMENT — PAIN - FUNCTIONAL ASSESSMENT: PAIN_FUNCTIONAL_ASSESSMENT: 0-10

## 2024-02-02 ASSESSMENT — PAIN SCALES - GENERAL: PAINLEVEL_OUTOF10: 0 - NO PAIN

## 2024-02-02 NOTE — PROGRESS NOTES
"    Occupational Therapy  Occupational Therapy Treatment    Patient Name: Edda Webb  MRN: [id number]  Today's Date: 2/2/2024         Insurance:  Visit number: 15 of 32  Insurance Type: [employer] Medicare     General:  Reason for visit: R RTSR   Referred by: Andriy     Current Problem  1. Arthritis of right shoulder region            Precautions   12 weeks=shoulder strengthening       Subjective:   Patient reports \"I am feeling a little better every day.\"    Performing HEP?: Yes    Pain  Pain Assessment: 0-10  Pain Score: 0 - No pain    Objective:     AROM: R shoulder [id number] with standing    Physical Observation: intact   Edema: none    Sensory: intact  Numbness/Tingling: none    Treatments:     Modalities:      5 min  Modalities  Modalities Used: Yes  Modality 1: Untimed Hotpack    Therapeutic Exercise:   45 min  Therapeutic Exercise  Therapeutic Exercise Performed: Yes  Therapeutic Exercise Activity 1: ergometer x 6 mins  Therapeutic Exercise Activity 2: overhead pulleys x 20 reps each  Therapeutic Exercise Activity 3: supine single exercises x 15 reps each  Therapeutic Exercise Activity 4: cone reaching x 6 reps with 5 cones  Therapeutic Exercise Activity 5: wall washes x 15 reps each    Manual Therapy:     10 min  Manual Therapy  Manual Therapy Performed: Yes  Manual Therapy Activity 1: R anterior shoulder tissue mobilization        Assessment: Progress is noted with AROM of R shoulder flexion this p.m.        Plan: Continue with plan of care 1-2x/wk       Erna Wiseman OT  "

## 2024-02-06 ENCOUNTER — TREATMENT (OUTPATIENT)
Dept: OCCUPATIONAL THERAPY | Facility: CLINIC | Age: 74
End: 2024-02-06
Payer: MEDICARE

## 2024-02-06 DIAGNOSIS — M19.011 PRIMARY OSTEOARTHRITIS, RIGHT SHOULDER: ICD-10-CM

## 2024-02-06 DIAGNOSIS — M19.011 ARTHRITIS OF RIGHT SHOULDER REGION: Primary | ICD-10-CM

## 2024-02-06 PROCEDURE — 97140 MANUAL THERAPY 1/> REGIONS: CPT | Mod: GO | Performed by: OCCUPATIONAL THERAPIST

## 2024-02-06 PROCEDURE — 97110 THERAPEUTIC EXERCISES: CPT | Mod: GO | Performed by: OCCUPATIONAL THERAPIST

## 2024-02-06 ASSESSMENT — PAIN - FUNCTIONAL ASSESSMENT: PAIN_FUNCTIONAL_ASSESSMENT: 0-10

## 2024-02-06 ASSESSMENT — PAIN SCALES - GENERAL: PAINLEVEL_OUTOF10: 0 - NO PAIN

## 2024-02-06 NOTE — PROGRESS NOTES
"    Occupational Therapy  Occupational Therapy Treatment    Patient Name: Edda Webb  MRN: [id number]  Today's Date: 2/6/2024         Insurance:  Visit number: 16 of 32  Insurance Type: [employer] Medicare     General:  Reason for visit: R RTSR   Referred by: Andriy    Current Problem  1. Arthritis of right shoulder region        2. Primary osteoarthritis, right shoulder  Follow Up In Occupational Therapy          Precautions   none      Subjective:   Patient reports \"I am trying to do more.\"    Performing HEP?: Yes    Pain  Pain Assessment: 0-10  Pain Score: 0 - No pain    Objective:     AROM:     Physical Observation: intact   Edema: none     Sensory: intact   Numbness/Tingling: none    Treatments:     Modalities:      5 min  Modalities  Modalities Used: Yes  Modality 1: Untimed Hotpack    Therapeutic Exercise:   45 min  Therapeutic Exercise  Therapeutic Exercise Performed: Yes  Therapeutic Exercise Activity 1: ergometer x 6 mins  Therapeutic Exercise Activity 2: overhead pulleys  Therapeutic Exercise Activity 3: supine single exercises x 20 reps each  Therapeutic Exercise Activity 4: cone reaching  Therapeutic Exercise Activity 5: wall washes x 15 reps each  Therapeutic Exercise Activity 6: biceps series x 20 reps each with    Manual Therapy:     10 min  Manual Therapy  Manual Therapy Performed: Yes  Manual Therapy Activity 1: R anterior shoulder capsule tissue mobilization      Assessment: Patient tolerated light strengthening well.       Plan: Continue with plan of care 1-2x/wk.       Erna Wiseman OT  "

## 2024-02-09 ENCOUNTER — TREATMENT (OUTPATIENT)
Dept: OCCUPATIONAL THERAPY | Facility: CLINIC | Age: 74
End: 2024-02-09
Payer: MEDICARE

## 2024-02-09 DIAGNOSIS — M19.011 ARTHRITIS OF RIGHT SHOULDER REGION: Primary | ICD-10-CM

## 2024-02-09 DIAGNOSIS — M19.011 PRIMARY OSTEOARTHRITIS, RIGHT SHOULDER: ICD-10-CM

## 2024-02-09 PROCEDURE — 97140 MANUAL THERAPY 1/> REGIONS: CPT | Mod: GO | Performed by: OCCUPATIONAL THERAPIST

## 2024-02-09 PROCEDURE — 97110 THERAPEUTIC EXERCISES: CPT | Mod: GO | Performed by: OCCUPATIONAL THERAPIST

## 2024-02-09 ASSESSMENT — PAIN SCALES - GENERAL: PAINLEVEL_OUTOF10: 0 - NO PAIN

## 2024-02-09 ASSESSMENT — PAIN - FUNCTIONAL ASSESSMENT: PAIN_FUNCTIONAL_ASSESSMENT: 0-10

## 2024-02-09 NOTE — PROGRESS NOTES
"    Occupational Therapy  Occupational Therapy Treatment    Patient Name: Edda Webb  MRN: [id number]  Today's Date: 2/9/2024         Insurance:  Visit number: 17 of 32  Insurance Type: Medicare     General:  Reason for visit: R RTSR   Referred by: Andriy     Current Problem  1. Arthritis of right shoulder region        2. Primary osteoarthritis, right shoulder  Follow Up In Occupational Therapy          Precautions   none      Subjective:   Patient reports \"I am trying to use it more.\"    Performing HEP?: Yes    Pain  Pain Assessment: 0-10  Pain Score: 0 - No pain    Objective:     AROM: R shoulder flexion with standing=95    Physical Observation: intact   Edema: none    Sensory: intact  Numbness/Tingling: none    Treatments:     Modalities:      5 min  Modalities  Modalities Used: Yes  Modality 1: Untimed Hotpack    Therapeutic Exercise:   45 min  Therapeutic Exercise  Therapeutic Exercise Performed: Yes  Therapeutic Exercise Activity 1: ergometer x 6 mins  Therapeutic Exercise Activity 2: overhead pulleys x 20 reps each  Therapeutic Exercise Activity 3: supine single exercises x 10 reps with and without a 1# weight.  Therapeutic Exercise Activity 4: cone reaching x 9 reps  Therapeutic Exercise Activity 6: bicep series x 15  reps each    Manual Therapy:     10 min  Manual Therapy  Manual Therapy Performed: Yes  Manual Therapy Activity 1: R anterior shoulder        Assessment: Progress is noted with AROM of R shoulder and patient tolerated strength initiation well.        Plan: Continue with plan of care 1-2x/wk       Erna Wiseman OT  "

## 2024-02-13 ENCOUNTER — TREATMENT (OUTPATIENT)
Dept: OCCUPATIONAL THERAPY | Facility: CLINIC | Age: 74
End: 2024-02-13
Payer: MEDICARE

## 2024-02-13 DIAGNOSIS — M19.011 PRIMARY OSTEOARTHRITIS, RIGHT SHOULDER: ICD-10-CM

## 2024-02-13 PROCEDURE — 97110 THERAPEUTIC EXERCISES: CPT | Mod: GO | Performed by: OCCUPATIONAL THERAPIST

## 2024-02-13 PROCEDURE — 97140 MANUAL THERAPY 1/> REGIONS: CPT | Mod: GO | Performed by: OCCUPATIONAL THERAPIST

## 2024-02-13 ASSESSMENT — PAIN SCALES - GENERAL: PAINLEVEL_OUTOF10: 0 - NO PAIN

## 2024-02-13 ASSESSMENT — PAIN - FUNCTIONAL ASSESSMENT: PAIN_FUNCTIONAL_ASSESSMENT: 0-10

## 2024-02-13 NOTE — PROGRESS NOTES
"    Occupational Therapy  Occupational Therapy Treatment    Patient Name: Edda Webb  MRN: [id number]  Today's Date: 2/13/2024         Insurance:  Visit number: 18 of 32  Insurance Type: [employer] Medicare     General:  Reason for visit: R RTSR  Referred by: Andriy    Current Problem  1. Primary osteoarthritis, right shoulder  Follow Up In Occupational Therapy          Precautions   none      Subjective:   Patient reports \"I get a weird pain every once in awhile.\"    Performing HEP?: Yes    Pain  Pain Assessment: 0-10  Pain Score: 0 - No pain    Objective:     Strength: R elbow flexion=4/5, extension=4/5  Physical Observation: intact  Edema: none    Sensory: intact  Numbness/Tingling: none    Treatments:     Modalities:      5 min  Modalities  Modalities Used: Yes  Modality 1: Untimed Hotpack    Therapeutic Exercise:   45 min  Therapeutic Exercise  Therapeutic Exercise Performed: Yes  Therapeutic Exercise Activity 1: ergometer x 6 mins  Therapeutic Exercise Activity 2: overhead pulleys x 20 reps  Therapeutic Exercise Activity 3: supine single exercises x 10 reps with and without a 1# weight.  Therapeutic Exercise Activity 4: standing B shoulder flexion and scaption x 10 reps each  Therapeutic Exercise Activity 6: biceps series x 10 reps each with 1# weight    Manual Therapy:     10 min  Manual Therapy  Manual Therapy Performed: Yes  Manual Therapy Activity 1: R anterior shoulder capsule soft tissue mobilization with use      Assessment: Good tolerance with strength progression        Plan: Continue with plan of care 1-2x/wk        Erna Wiseman OT  "

## 2024-02-15 ENCOUNTER — TELEPHONE (OUTPATIENT)
Dept: ORTHOPEDIC SURGERY | Facility: HOSPITAL | Age: 74
End: 2024-02-15
Payer: MEDICARE

## 2024-02-15 DIAGNOSIS — Z97.8 ORTHOPEDIC HARDWARE PRESENT: Primary | ICD-10-CM

## 2024-02-15 RX ORDER — AMOXICILLIN 500 MG/1
2000 TABLET, FILM COATED ORAL
Qty: 4 TABLET | Refills: 2 | Status: SHIPPED | OUTPATIENT
Start: 2024-02-15 | End: 2024-02-15

## 2024-02-15 NOTE — TELEPHONE ENCOUNTER
[facility] IN [place]   SHE IS GOING TO GET DENTAL WORK DONE ON MONDAY. SHE HAD SHOULDER REPLACEMENT WANT TO KNOW IF WE CAN CALL HER IN SOME MEDS

## 2024-02-16 ENCOUNTER — TREATMENT (OUTPATIENT)
Dept: OCCUPATIONAL THERAPY | Facility: CLINIC | Age: 74
End: 2024-02-16
Payer: MEDICARE

## 2024-02-16 DIAGNOSIS — M19.011 PRIMARY OSTEOARTHRITIS, RIGHT SHOULDER: ICD-10-CM

## 2024-02-16 DIAGNOSIS — M19.011 ARTHRITIS OF RIGHT SHOULDER REGION: Primary | ICD-10-CM

## 2024-02-16 PROCEDURE — 97140 MANUAL THERAPY 1/> REGIONS: CPT | Mod: GO | Performed by: OCCUPATIONAL THERAPIST

## 2024-02-16 PROCEDURE — 97110 THERAPEUTIC EXERCISES: CPT | Mod: GO | Performed by: OCCUPATIONAL THERAPIST

## 2024-02-16 ASSESSMENT — PAIN SCALES - GENERAL: PAINLEVEL_OUTOF10: 0 - NO PAIN

## 2024-02-16 ASSESSMENT — PAIN - FUNCTIONAL ASSESSMENT: PAIN_FUNCTIONAL_ASSESSMENT: 0-10

## 2024-02-16 NOTE — PROGRESS NOTES
"    Occupational Therapy  Occupational Therapy Orthopedic Progress Note    Patient Name: Edda Webb  MRN: [id number]  Today's Date: 2/16/2024       Insurance:  Visit number: 19 of 32  Authorization info: $20 co-pay  Insurance Type: [facility] Medicare      General:  Reason for visit: R RTSR   Referred by: Andriy    Current Problem  1. Arthritis of right shoulder region        2. Primary osteoarthritis, right shoulder  Follow Up In Occupational Therapy          Precautions: none         Subjective:  Patient reports \"I still feel pretty stiff.\"    Current Condition:  Better    Pain:  Pain Assessment: 0-10  Pain Score: 0 - No pain  Aggravating Factors: overhead reaching   Relieving Factors:  Rest, Heat, and Stretching     Self Reported Function (0-100%) = 75%  - 100% being back to PLOF    Objective:    AROM: R shoulder [id number]=216, abduction=90, and ER=68  Strength: R shoulder flexion=3+/5, abduction=3/5, and ER=3+/5    Outcome Measures: Updated 2/16/2024  UEFI: 48/80    EDUCATION: home exercise program, plan of care, activity modifications, pain management, and injury pathology       Goals: Updated 2/16/2024      Plan of care was developed with input and agreement by the patient    Treatments:     Modalities:      5 min  Modalities  Modalities Used: Yes  Modality 1: Untimed Hotpack    Therapeutic Exercise:    45 min  Therapeutic Exercise  Therapeutic Exercise Performed: Yes  Therapeutic Exercise Activity 1: ergometer x 6 mins  Therapeutic Exercise Activity 2: overhead pulleys x 20 reps each  Therapeutic Exercise Activity 3: supine single exercises x 10 reps with and without a 1# weight  Therapeutic Exercise Activity 4: standing B shoulder flexion and sc  Therapeutic Exercise Activity 6: biceps series x    Manual Therapy:     10 min  Manual Therapy  Manual Therapy Performed: Yes  Manual Therapy Activity 1: R anterior shoulder capsule tissue mobilization.      Assessment: Progress is noted in all areas of AROM, strength, " pain tolerance, and independence with daily function. Recommend OT to continue 1-2x/wk to maximize rehab potential.        Plan:      Planned Interventions include: therapeutic exercise, therapeutic activity, self-care home management, manual therapy, therapeutic activities, gait training, neuromuscular coordination, vasopneumatic, dry needling, aquatic therapy, electric stimulation, fluidotherapy, ultrasound, kinesiotaping, orthosis fabrication, wound care  Frequency: 1-2 x Week  Duration: 4 Weeks    Erna Wiseman, OT

## 2024-02-23 ENCOUNTER — TREATMENT (OUTPATIENT)
Dept: OCCUPATIONAL THERAPY | Facility: CLINIC | Age: 74
End: 2024-02-23
Payer: MEDICARE

## 2024-02-23 DIAGNOSIS — M19.011 PRIMARY OSTEOARTHRITIS, RIGHT SHOULDER: ICD-10-CM

## 2024-02-23 PROCEDURE — 97140 MANUAL THERAPY 1/> REGIONS: CPT | Mod: GO | Performed by: OCCUPATIONAL THERAPIST

## 2024-02-23 PROCEDURE — 97110 THERAPEUTIC EXERCISES: CPT | Mod: GO | Performed by: OCCUPATIONAL THERAPIST

## 2024-02-23 ASSESSMENT — PAIN SCALES - GENERAL: PAINLEVEL_OUTOF10: 0 - NO PAIN

## 2024-02-23 ASSESSMENT — PAIN - FUNCTIONAL ASSESSMENT: PAIN_FUNCTIONAL_ASSESSMENT: 0-10

## 2024-02-23 NOTE — PROGRESS NOTES
"    Occupational Therapy  Occupational Therapy Treatment    Patient Name: Edda Webb  MRN: [id number]  Today's Date: 2/23/2024         Insurance:  Visit number: 20 of 32  Insurance Type: [employer] Medicare     General:  Reason for visit: R RTSR   Referred by: Andriy     Current Problem  1. Primary osteoarthritis, right shoulder  Follow Up In Occupational Therapy          Precautions   none      Subjective:   Patient reports \"I feel like I am getting stronger.\"    Performing HEP?: Yes    Pain  Pain Assessment: 0-10  Pain Score: 0 - No pain    Objective:     Patient is 14 weeks post-op    Physical Observation: intact   Edema: none    Sensory: intact  Numbness/Tingling: none    Treatments:     Modalities:      5 min  Modalities  Modalities Used: Yes  Modality 1: Untimed Hotpack    Therapeutic Exercise:   45 min  Therapeutic Exercise  Therapeutic Exercise Performed: Yes  Therapeutic Exercise Activity 1: ergometer x 6 mins  Therapeutic Exercise Activity 2: overhead pulleys x 20 reps each  Therapeutic Exercise Activity 3: yellow theraband x 10 reps each with shoulder flex, ext, add, abd, IR, and  ER  Therapeutic Exercise Activity 4: supine single exercises x 15 reps each with 1# weight    Manual Therapy:     10 min  Manual Therapy  Manual Therapy Performed: Yes  Manual Therapy Activity 1: R anterior shoulder capsule soft tissue mobilization        Assessment: Good tolerance with strengthening progression.        Plan: Continue with plan of care 1-2x/wk       Erna Wiseman OT  "

## 2024-02-27 ENCOUNTER — HOSPITAL ENCOUNTER (OUTPATIENT)
Dept: CARDIOLOGY | Facility: HOSPITAL | Age: 74
Discharge: HOME | End: 2024-02-27
Payer: MEDICARE

## 2024-02-27 ENCOUNTER — TREATMENT (OUTPATIENT)
Dept: OCCUPATIONAL THERAPY | Facility: CLINIC | Age: 74
End: 2024-02-27
Payer: MEDICARE

## 2024-02-27 DIAGNOSIS — Z95.0 PRESENCE OF CARDIAC PACEMAKER: ICD-10-CM

## 2024-02-27 DIAGNOSIS — M19.011 PRIMARY OSTEOARTHRITIS, RIGHT SHOULDER: ICD-10-CM

## 2024-02-27 DIAGNOSIS — R55 SYNCOPE AND COLLAPSE: ICD-10-CM

## 2024-02-27 PROCEDURE — 97110 THERAPEUTIC EXERCISES: CPT | Mod: GO | Performed by: OCCUPATIONAL THERAPIST

## 2024-02-27 PROCEDURE — 97140 MANUAL THERAPY 1/> REGIONS: CPT | Mod: GO | Performed by: OCCUPATIONAL THERAPIST

## 2024-02-27 ASSESSMENT — PAIN - FUNCTIONAL ASSESSMENT: PAIN_FUNCTIONAL_ASSESSMENT: 0-10

## 2024-02-27 ASSESSMENT — PAIN SCALES - GENERAL: PAINLEVEL_OUTOF10: 0 - NO PAIN

## 2024-02-27 NOTE — PROGRESS NOTES
"    Occupational Therapy  Occupational Therapy Treatment    Patient Name: Edda Webb  MRN: [id number]  Today's Date: 2/27/2024         Insurance:  Visit number: 21 of 32  Authorization info: no authorization  Insurance Type: [facility] Medicare    General:  Reason for visit: R RTSR  Referred by: Andriy    Current Problem  1. Primary osteoarthritis, right shoulder  Follow Up In Occupational Therapy          Precautions   none      Subjective:   Patient reports \"Somethings are definitely easier.\"    Performing HEP?: Yes    Pain  Pain Assessment: 0-10  Pain Score: 0 - No pain  Objective:     Strength: R shoulder flexion=3+/5    Physical Observation: intact  Edema: none    Sensory: intact   Numbness/Tingling: none    Treatments:     Modalities:      5 min  Modalities  Modalities Used: Yes  Modality 1: Untimed Hotpack    Therapeutic Exercise:   45 min  Therapeutic Exercise  Therapeutic Exercise Performed: Yes  Therapeutic Exercise Activity 1: ergometer x 6 mins  Therapeutic Exercise Activity 2: overhead pulleys x 20 reps  Therapeutic Exercise Activity 3: yellow theraband x 10 reps each  Therapeutic Exercise Activity 4: supine single exercises x 5 reps without and x 10 reps with 1# weight    Manual Therapy:     10 min  Manual Therapy  Manual Therapy Performed: Yes  Manual Therapy Activity 1: R anterior shoulder soft tissue mobilization with use of Biofreeze      Assessment: Improved strength is noted this p.m.        Plan: Continue with plan of care 1-2x/wk.        Erna Wiseman OT  "

## 2024-03-01 ENCOUNTER — TREATMENT (OUTPATIENT)
Dept: OCCUPATIONAL THERAPY | Facility: CLINIC | Age: 74
End: 2024-03-01
Payer: MEDICARE

## 2024-03-01 DIAGNOSIS — M19.011 PRIMARY OSTEOARTHRITIS, RIGHT SHOULDER: ICD-10-CM

## 2024-03-01 DIAGNOSIS — M19.011 ARTHRITIS OF RIGHT SHOULDER REGION: Primary | ICD-10-CM

## 2024-03-01 PROCEDURE — 97140 MANUAL THERAPY 1/> REGIONS: CPT | Mod: GO | Performed by: OCCUPATIONAL THERAPIST

## 2024-03-01 PROCEDURE — 97110 THERAPEUTIC EXERCISES: CPT | Mod: GO | Performed by: OCCUPATIONAL THERAPIST

## 2024-03-01 ASSESSMENT — PAIN - FUNCTIONAL ASSESSMENT: PAIN_FUNCTIONAL_ASSESSMENT: 0-10

## 2024-03-01 ASSESSMENT — PAIN SCALES - GENERAL: PAINLEVEL_OUTOF10: 0 - NO PAIN

## 2024-03-01 NOTE — PROGRESS NOTES
"    Occupational Therapy  Occupational Therapy Treatment    Patient Name: Edda Webb  MRN: [id number]  Today's Date: 3/1/2024         Insurance:  Visit number: 22 of 32  Authorization info: no authorization needed  Insurance Type: [facility] Medicare     General:  Reason for visit: R RTSR  Referred by: Andriy    Current Problem  1. Arthritis of right shoulder region        2. Primary osteoarthritis, right shoulder  Follow Up In Occupational Therapy          Precautions   none      Subjective:   Patient reports \"I can do more at home.\"    Performing HEP?: Yes    Pain  Pain Assessment: 0-10  Pain Score: 0 - No pain    Objective:     Strength: R shoulder IR=4/5    Physical Observation: intact  Edema: none    Sensory: intact  Numbness/Tingling: none    Treatments:     Modalities:      5 min  Modalities  Modalities Used: Yes  Modality 1: Untimed Hotpack    Therapeutic Exercise:   45 min  Therapeutic Exercise  Therapeutic Exercise Performed: Yes  Therapeutic Exercise Activity 1: ergometer x 6 mins  Therapeutic Exercise Activity 2: overhead pulleys x 20 reps each  Therapeutic Exercise Activity 3: yellow theraband x15 reps each  Therapeutic Exercise Activity 4: B shoulder flexion and scaption x 10 reps without and with 1# weights.    Manual Therapy:     10 min  Manual Therapy  Manual Therapy Performed: Yes  Manual Therapy Activity 1: R anterior shoulder capsule tissue mobilizatio      Assessment: Improved strength is noted this a.m.        Plan: Continue with plan of care 1-2x/wk.       Erna Wiseman OT  "

## 2024-03-05 ENCOUNTER — TREATMENT (OUTPATIENT)
Dept: OCCUPATIONAL THERAPY | Facility: CLINIC | Age: 74
End: 2024-03-05
Payer: MEDICARE

## 2024-03-05 DIAGNOSIS — M19.011 PRIMARY OSTEOARTHRITIS OF RIGHT SHOULDER: ICD-10-CM

## 2024-03-05 PROCEDURE — 97110 THERAPEUTIC EXERCISES: CPT | Mod: GO | Performed by: OCCUPATIONAL THERAPIST

## 2024-03-05 PROCEDURE — 97140 MANUAL THERAPY 1/> REGIONS: CPT | Mod: GO | Performed by: OCCUPATIONAL THERAPIST

## 2024-03-05 ASSESSMENT — PAIN SCALES - GENERAL: PAINLEVEL_OUTOF10: 0 - NO PAIN

## 2024-03-05 ASSESSMENT — PAIN - FUNCTIONAL ASSESSMENT: PAIN_FUNCTIONAL_ASSESSMENT: 0-10

## 2024-03-05 NOTE — PROGRESS NOTES
"    Occupational Therapy  Occupational Therapy Treatment    Patient Name: Edda Webb  MRN: [id number]  Today's Date: 3/5/2024         Insurance:  Visit number: 23 of 32  Authorization info: no authorization is needed   Insurance Type: [facility] Medicare     General:  Reason for visit: R RTSR   Referred by: Andriy    Current Problem  1. Primary osteoarthritis of right shoulder  Follow Up In Occupational Therapy          Precautions   None       Subjective:   Patient reports \"I am feeling pretty good.\"    Performing HEP?: Yes    Pain  Pain Assessment: 0-10  Pain Score: 0 - No pain    Objective:     Strength: R shoulder flexion=4/5    Physical Observation: intact   Edema: none    Sensory: intact  Numbness/Tingling: none    Treatments:     Modalities:      5 min  Modalities  Modalities Used: Yes  Modality 1: Untimed Hotpack    Therapeutic Exercise:   45 min  Therapeutic Exercise  Therapeutic Exercise Performed: Yes  Therapeutic Exercise Activity 1: ergometer x 6 mins  Therapeutic Exercise Activity 2: overhead pulleys x 20 reps each  Therapeutic Exercise Activity 3: scapular mobs  Therapeutic Exercise Activity 4: yellow & orange theraband x 15 reps each    Manual Therapy:     10 min  Manual Therapy  Manual Therapy Performed: Yes  Manual Therapy Activity 1: R anterior shoulder tissue mobilization        Assessment: Good tolerance with strengthening progression.        Plan: Continue with plan of care1-2x/wk       Erna Wiseman OT  "

## 2024-03-08 ENCOUNTER — TREATMENT (OUTPATIENT)
Dept: OCCUPATIONAL THERAPY | Facility: CLINIC | Age: 74
End: 2024-03-08
Payer: MEDICARE

## 2024-03-08 DIAGNOSIS — M19.011 PRIMARY OSTEOARTHRITIS OF RIGHT SHOULDER: ICD-10-CM

## 2024-03-08 PROCEDURE — 97140 MANUAL THERAPY 1/> REGIONS: CPT | Mod: GO | Performed by: OCCUPATIONAL THERAPIST

## 2024-03-08 PROCEDURE — 97110 THERAPEUTIC EXERCISES: CPT | Mod: GO | Performed by: OCCUPATIONAL THERAPIST

## 2024-03-08 ASSESSMENT — PAIN SCALES - GENERAL: PAINLEVEL_OUTOF10: 0 - NO PAIN

## 2024-03-08 ASSESSMENT — PAIN - FUNCTIONAL ASSESSMENT: PAIN_FUNCTIONAL_ASSESSMENT: 0-10

## 2024-03-11 ENCOUNTER — TELEPHONE (OUTPATIENT)
Dept: PRIMARY CARE | Facility: CLINIC | Age: 74
End: 2024-03-11
Payer: MEDICARE

## 2024-03-11 DIAGNOSIS — R73.09 ELEVATED GLUCOSE: Primary | ICD-10-CM

## 2024-03-12 ENCOUNTER — OFFICE VISIT (OUTPATIENT)
Dept: ORTHOPEDIC SURGERY | Facility: CLINIC | Age: 74
End: 2024-03-12
Payer: MEDICARE

## 2024-03-12 ENCOUNTER — HOSPITAL ENCOUNTER (OUTPATIENT)
Dept: RADIOLOGY | Facility: CLINIC | Age: 74
Discharge: HOME | End: 2024-03-12
Payer: MEDICARE

## 2024-03-12 ENCOUNTER — TREATMENT (OUTPATIENT)
Dept: OCCUPATIONAL THERAPY | Facility: CLINIC | Age: 74
End: 2024-03-12
Payer: MEDICARE

## 2024-03-12 DIAGNOSIS — M19.011 OSTEOARTHRITIS OF RIGHT SHOULDER, UNSPECIFIED OSTEOARTHRITIS TYPE: ICD-10-CM

## 2024-03-12 DIAGNOSIS — M19.011 PRIMARY OSTEOARTHRITIS OF RIGHT SHOULDER: ICD-10-CM

## 2024-03-12 PROCEDURE — 1159F MED LIST DOCD IN RCRD: CPT | Performed by: ORTHOPAEDIC SURGERY

## 2024-03-12 PROCEDURE — 97110 THERAPEUTIC EXERCISES: CPT | Mod: GO | Performed by: OCCUPATIONAL THERAPIST

## 2024-03-12 PROCEDURE — 1036F TOBACCO NON-USER: CPT | Performed by: ORTHOPAEDIC SURGERY

## 2024-03-12 PROCEDURE — 99213 OFFICE O/P EST LOW 20 MIN: CPT | Performed by: ORTHOPAEDIC SURGERY

## 2024-03-12 PROCEDURE — 73030 X-RAY EXAM OF SHOULDER: CPT | Mod: RIGHT SIDE | Performed by: RADIOLOGY

## 2024-03-12 PROCEDURE — 1126F AMNT PAIN NOTED NONE PRSNT: CPT | Performed by: ORTHOPAEDIC SURGERY

## 2024-03-12 PROCEDURE — 1160F RVW MEDS BY RX/DR IN RCRD: CPT | Performed by: ORTHOPAEDIC SURGERY

## 2024-03-12 PROCEDURE — 97140 MANUAL THERAPY 1/> REGIONS: CPT | Mod: GO | Performed by: OCCUPATIONAL THERAPIST

## 2024-03-12 PROCEDURE — 73030 X-RAY EXAM OF SHOULDER: CPT | Mod: RT

## 2024-03-12 ASSESSMENT — ENCOUNTER SYMPTOMS
CHILLS: 0
FATIGUE: 0
ARTHRALGIAS: 1
FEVER: 0
WHEEZING: 0
SHORTNESS OF BREATH: 0

## 2024-03-12 ASSESSMENT — PAIN - FUNCTIONAL ASSESSMENT: PAIN_FUNCTIONAL_ASSESSMENT: 0-10

## 2024-03-12 ASSESSMENT — PAIN SCALES - GENERAL: PAINLEVEL_OUTOF10: 0 - NO PAIN

## 2024-03-12 NOTE — PROGRESS NOTES
"    Occupational Therapy  Occupational Therapy Treatment    Patient Name: Edda Webb  MRN: [id number]  Today's Date: 3/12/2024  Time Calculation  Start Time: 1220  Stop Time: 1320  Time Calculation (min): 60 min      Insurance:    Time:  Time Calculation  Start Time: 1220  Stop Time: 1320  Time Calculation (min): 60 min  OT Therapeutic Procedures Time Entry  Manual Therapy Time Entry: 10  Therapeutic Exercise Time Entry: 45    Insurance:  Visit number: 26 of 26 including evaluation  Authorization info: no authorization   Insurance Type: Payor: [employer] / Plan: [employer]IC[employer] / Product Type: *No Product type* /    General:  Reason for visit: R RTSR   Referred by: Andriy     Current Problem  1. Primary osteoarthritis of right shoulder  Follow Up In Occupational Therapy          Precautions   none      Subjective:   Patient reports \"I feel good.\"    Performing HEP?: Yes    Pain  Pain Assessment: 0-10  Pain Score: 0 - No pain    Objective:   AROM: R shoulder flexion=, abduction=, and ER=Occupational Therapy Discharge      Patient Name: Edda Webb  MRN: [id number]  Today's Date: 3/12/2024    Insurance:  Visit number: 26 of 26 including evaluation  Authorization info: no authorization is needed   Insurance Type: [facility] Medicare     Referred by: Andriy  Referred for: R RTSR       Discharge Information: Date of discharge 3/12/24 and Date of last visit 3/12/24    Therapy Summary: AROM: R shoulder iczuyex=785, nslwoodlw=650, and ER=72           Strength: R shoulder flexion=5/5, abduction=5/5, and ER=5/5    Discharge Status:  is independent with basic ADL's and home exercises. Recommend discharge at this time with follow-up as needed.      Rehab Discharge Reason: Achieved all and/or the most significant goals(s)    Physical Observation: Intact   Edema: none    Sensory: intact   Numbness/Tingling: none    Treatments:     Modalities:      5 min  Modalities  Modalities Used: " Yes  Modality 1: Untimed Hotpack    Therapeutic Exercise:   45 min  Therapeutic Exercise  Therapeutic Exercise Performed: Yes  Therapeutic Exercise Activity 1: ergometer  6 mins  Therapeutic Exercise Activity 2: overhead pulleys x 20 reps  Therapeutic Exercise Activity 3: scapular mobs  Therapeutic Exercise Activity 4: yellow and orange theraband x 15 reps each  Therapeutic Exercise Activity 5: standing B shoulder flexion and scaption x 20 reps each with 1# weights    Manual Therapy:     10 min  Manual Therapy  Manual Therapy Performed: Yes  Manual Therapy Activity 1: R anterior shoulder capsule tissue mobilization      Assessment:  is independent with basic ADL's and home exercises.       Plan: Recommend discharge with follow-up as needed.        Erna Wiseman, OT

## 2024-03-12 NOTE — PROGRESS NOTES
Reason for Appointment  Chief Complaint   Patient presents with    Right Shoulder - Follow-up, Post-op     History of Present Illness  Patient is a 74 y.o. female here today for follow-up evaluation of her right shoulder 4 months s/p a right reverse shoulder replacement. X-rays today look excellent with good alignment. She was seen in therapy and is doing very well with no significant pain. She does have some stiffness in the shoulder which is expected. No other updates to PMH, allergies, or medications.     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    Hyperlipidemia     Hyperlipidemia     Hypertension     Hypertension     Neuropathy     Other abnormal glucose 08/08/2016    Other abnormal glucose    Other conditions influencing health status     No significant past medical history    Pain in right shoulder 02/02/2016    Right shoulder pain    Pain in right shoulder 02/02/2016    Right shoulder pain    Pathological fracture due to osteoporosis 01/10/2019    Peripheral neuropathy     Personal history of diseases of the skin and subcutaneous tissue 08/08/2016    History of solar lentigo    Personal history of other diseases of the nervous system and sense organs 02/05/2016    History of polyneuropathy    Personal history of other specified conditions 08/08/2016    History of shortness of breath    Pneumonia     8/2023    Primary stabbing headache 08/08/2016    Primary stabbing headache       Past Surgical History:   Procedure Laterality Date    APPENDECTOMY      BREAST CYST EXCISION Left     CARDIAC CATHETERIZATION      HYSTERECTOMY  02/02/2016    Hysterectomy     LAPAROSCOPIC NISSEN FUNDOPLICATION      PACEMAKER PLACEMENT Left     WRIST FRACTURE SURGERY Left     with hardware       Medication Documentation Review Audit       Reviewed by Bandar Valdes MD (Physician) on 01/29/24 at 1017      Medication Order Taking? Sig Documenting Provider Last Dose Status   acetaminophen (Tylenol 8 HOUR) 650 mg ER tablet [id number] Yes Take 2 tablets (1,300 mg) by mouth if needed. Do not crush, chew, or split. Historical MD Ciarra Taking Active   albuterol (Proventil HFA) 90 mcg/actuation inhaler [id number] Yes 2 puffs every 6 hours if needed for shortness of breath. Historical Provider, MD Taking Active   aspirin 81 mg EC tablet [id number] Yes Take 1 tablet (81 mg) by mouth once daily. Historical MD Ciarra Taking Active   Discontinued 01/29/24 1017   biotin 5 mg capsule [id number] No Take by mouth once every 24 hours. Historical MD Ciarra Not Taking Flag for Review   DOCOSAHEXAENOIC ACID ORAL [id number] No Take by mouth once daily. Historical MD Ciarra Not Taking Flag for Review   doxycycline (Monodox) 100 mg capsule [id number] No Take by mouth. Historical Provider, MD Not Taking Flag for Review   gabapentin (Neurontin) 600 mg tablet [id number] Yes Take 1 tablet (600 mg) by mouth 3 times a day. One tab at BF and Lunch and 2 at HS Sharon Garcia MD Taking Active   lisinopril 10 mg tablet [id number] Yes Take 1 tablet (10 mg) by mouth once daily. Historical MD Ciarra Taking Active   loratadine (Claritin) 10 mg tablet [id number] No Take 1 tablet (10 mg) by mouth once daily.   Patient not taking: Reported on 1/29/2024    Terri Chino, APRN-CNP Not Taking Flag for Review   melatonin 3 mg tablet [id number] No Take 1 tablet (3 mg) by mouth once daily as needed. Historical MD Ciarra Not Taking Flag for Review   multivit-minerals/folic acid (CENTRUM ADULT 50 PLUS ORAL) [id number] Yes Take 1 tablet by mouth once daily. Historical MD Ciarra Taking Active   naloxone (Narcan) 0.4 mg/mL  injection [id number] No Infuse 0.5 mL (0.2 mg) into a venous catheter. Historical Provider, MD Not Taking Flag for Review   omega 3-dha-epa-fish oil (Fish OiL) 1,000 mg (120 mg-180 mg) capsule [id number] Yes Take 1 capsule (1,000 mg) by mouth once daily. Historical Provider, MD Taking Active   oxyCODONE (Roxicodone) 5 mg immediate release tablet [id number] No Take 2 tablets (10 mg) by mouth every 4 hours if needed. Historical Provider, MD Not Taking Flag for Review   pantoprazole (ProtoNix) 40 mg EC tablet [id number] Yes Take 1 tablet (40 mg) by mouth once daily. Historical Provider, MD Taking Active   PAPAYA ENZYME ORAL [id number] Yes Take 2 tablets by mouth 2 times a day. Historical Provider, MD Taking Active   simvastatin (Zocor) 20 mg tablet [id number] Yes Take 1 tablet (20 mg) by mouth once daily at bedtime. Historical Provider, MD Taking Active   triamcinolone (Kenalog) 0.1 % cream [id number] Yes Apply 1 Application topically 2 times a day as needed. Apply to affected area Historical Provider, MD Taking Active                    Allergies   Allergen Reactions    Codeine Hives and Rash       Review of Systems   Constitutional:  Negative for chills, fatigue and fever.   Respiratory:  Negative for shortness of breath and wheezing.    Cardiovascular:  Negative for chest pain and leg swelling.   Musculoskeletal:  Positive for arthralgias.   All other systems reviewed and are negative.    Exam   On exam of the right arm, she has over 130 degrees of forward flexion, good deltoid function, no tenderness over acromion or scapula, no effusion, incision looks excellent, good pulses, good sensation    Assessment   Encounter Diagnosis   Name Primary?    Osteoarthritis of right shoulder, unspecified osteoarthritis type        Plan   She is doing very well and we discussed returning to normal daily activities with the arm. I cautioned her against heavy lifting. Follow-up in 3 months with repeat X-rays. She will call with any new  symptoms.     I, Twila Davis, attest that this documentation has been prepared under the direction and in the presence of Rome Bear MD. By signing below, I, Rome Bear MD, personally performed the services described in this documentation. All medical record entries made by the scribe were at my direction and in my presence. I have reviewed the chart and agree that the record reflects my personal performance and is accurate and complete. 03/12/24

## 2024-03-15 ENCOUNTER — APPOINTMENT (OUTPATIENT)
Dept: OCCUPATIONAL THERAPY | Facility: CLINIC | Age: 74
End: 2024-03-15
Payer: MEDICARE

## 2024-03-25 ENCOUNTER — LAB (OUTPATIENT)
Dept: LAB | Facility: LAB | Age: 74
End: 2024-03-25
Payer: MEDICARE

## 2024-03-25 DIAGNOSIS — R73.03 PREDIABETES: ICD-10-CM

## 2024-03-25 DIAGNOSIS — Z00.00 ROUTINE GENERAL MEDICAL EXAMINATION AT HEALTH CARE FACILITY: ICD-10-CM

## 2024-03-25 DIAGNOSIS — I10 BENIGN ESSENTIAL HYPERTENSION: ICD-10-CM

## 2024-03-25 DIAGNOSIS — E55.9 VITAMIN D DEFICIENCY: ICD-10-CM

## 2024-03-25 DIAGNOSIS — R73.09 ELEVATED GLUCOSE: ICD-10-CM

## 2024-03-25 DIAGNOSIS — E88.810 DYSMETABOLIC SYNDROME: ICD-10-CM

## 2024-03-25 LAB
25(OH)D3 SERPL-MCNC: 56 NG/ML (ref 30–100)
ALBUMIN SERPL BCP-MCNC: 4.2 G/DL (ref 3.4–5)
ALP SERPL-CCNC: 59 U/L (ref 33–136)
ALT SERPL W P-5'-P-CCNC: 18 U/L (ref 7–45)
ANION GAP SERPL CALC-SCNC: 10 MMOL/L (ref 10–20)
AST SERPL W P-5'-P-CCNC: 15 U/L (ref 9–39)
BASOPHILS # BLD AUTO: 0.03 X10*3/UL (ref 0–0.1)
BASOPHILS NFR BLD AUTO: 0.5 %
BILIRUB SERPL-MCNC: 0.4 MG/DL (ref 0–1.2)
BUN SERPL-MCNC: 18 MG/DL (ref 6–23)
CALCIUM SERPL-MCNC: 9.5 MG/DL (ref 8.6–10.3)
CHLORIDE SERPL-SCNC: 103 MMOL/L (ref 98–107)
CHOLEST SERPL-MCNC: 152 MG/DL (ref 0–199)
CHOLESTEROL/HDL RATIO: 2.5
CO2 SERPL-SCNC: 31 MMOL/L (ref 21–32)
CREAT SERPL-MCNC: 0.91 MG/DL (ref 0.5–1.05)
EGFRCR SERPLBLD CKD-EPI 2021: 67 ML/MIN/1.73M*2
EOSINOPHIL # BLD AUTO: 0.23 X10*3/UL (ref 0–0.4)
EOSINOPHIL NFR BLD AUTO: 4.1 %
ERYTHROCYTE [DISTWIDTH] IN BLOOD BY AUTOMATED COUNT: 14.5 % (ref 11.5–14.5)
EST. AVERAGE GLUCOSE BLD GHB EST-MCNC: 143 MG/DL
GLUCOSE SERPL-MCNC: 95 MG/DL (ref 74–99)
HBA1C MFR BLD: 6.6 %
HCT VFR BLD AUTO: 41.2 % (ref 36–46)
HDLC SERPL-MCNC: 62 MG/DL
HGB BLD-MCNC: 13 G/DL (ref 12–16)
IMM GRANULOCYTES # BLD AUTO: 0.01 X10*3/UL (ref 0–0.5)
IMM GRANULOCYTES NFR BLD AUTO: 0.2 % (ref 0–0.9)
LDLC SERPL CALC-MCNC: 65 MG/DL
LYMPHOCYTES # BLD AUTO: 1.53 X10*3/UL (ref 0.8–3)
LYMPHOCYTES NFR BLD AUTO: 27.5 %
MCH RBC QN AUTO: 28.9 PG (ref 26–34)
MCHC RBC AUTO-ENTMCNC: 31.6 G/DL (ref 32–36)
MCV RBC AUTO: 92 FL (ref 80–100)
MONOCYTES # BLD AUTO: 0.74 X10*3/UL (ref 0.05–0.8)
MONOCYTES NFR BLD AUTO: 13.3 %
NEUTROPHILS # BLD AUTO: 3.03 X10*3/UL (ref 1.6–5.5)
NEUTROPHILS NFR BLD AUTO: 54.4 %
NON HDL CHOLESTEROL: 90 MG/DL (ref 0–149)
NRBC BLD-RTO: 0 /100 WBCS (ref 0–0)
PLATELET # BLD AUTO: 297 X10*3/UL (ref 150–450)
POTASSIUM SERPL-SCNC: 4.5 MMOL/L (ref 3.5–5.3)
PROT SERPL-MCNC: 6.9 G/DL (ref 6.4–8.2)
RBC # BLD AUTO: 4.5 X10*6/UL (ref 4–5.2)
SODIUM SERPL-SCNC: 139 MMOL/L (ref 136–145)
TRIGL SERPL-MCNC: 126 MG/DL (ref 0–149)
TSH SERPL-ACNC: 2.07 MIU/L (ref 0.44–3.98)
VLDL: 25 MG/DL (ref 0–40)
WBC # BLD AUTO: 5.6 X10*3/UL (ref 4.4–11.3)

## 2024-03-25 PROCEDURE — 84443 ASSAY THYROID STIM HORMONE: CPT

## 2024-03-25 PROCEDURE — 80053 COMPREHEN METABOLIC PANEL: CPT

## 2024-03-25 PROCEDURE — 83036 HEMOGLOBIN GLYCOSYLATED A1C: CPT

## 2024-03-25 PROCEDURE — 36415 COLL VENOUS BLD VENIPUNCTURE: CPT

## 2024-03-25 PROCEDURE — 82306 VITAMIN D 25 HYDROXY: CPT

## 2024-03-25 PROCEDURE — 80061 LIPID PANEL: CPT

## 2024-03-25 PROCEDURE — 85025 COMPLETE CBC W/AUTO DIFF WBC: CPT

## 2024-03-26 ENCOUNTER — HOSPITAL ENCOUNTER (OUTPATIENT)
Dept: CARDIOLOGY | Facility: HOSPITAL | Age: 74
Discharge: HOME | End: 2024-03-26
Payer: MEDICARE

## 2024-03-26 DIAGNOSIS — R55 SYNCOPE AND COLLAPSE: ICD-10-CM

## 2024-03-26 DIAGNOSIS — Z95.0 PRESENCE OF CARDIAC PACEMAKER: ICD-10-CM

## 2024-03-29 ENCOUNTER — TELEPHONE (OUTPATIENT)
Dept: PRIMARY CARE | Facility: CLINIC | Age: 74
End: 2024-03-29
Payer: MEDICARE

## 2024-03-29 DIAGNOSIS — Z12.31 ENCOUNTER FOR SCREENING MAMMOGRAM FOR BREAST CANCER: ICD-10-CM

## 2024-03-29 NOTE — TELEPHONE ENCOUNTER
Is due for her Mammogram. She is asking for it to be sent to [facility].     Fax: [phone]

## 2024-03-30 DIAGNOSIS — K21.9 GERD WITHOUT ESOPHAGITIS: Primary | ICD-10-CM

## 2024-03-30 DIAGNOSIS — I10 BENIGN ESSENTIAL HYPERTENSION: ICD-10-CM

## 2024-03-31 RX ORDER — PANTOPRAZOLE SODIUM 40 MG/1
40 TABLET, DELAYED RELEASE ORAL DAILY
Qty: 90 TABLET | Refills: 3 | Status: SHIPPED | OUTPATIENT
Start: 2024-03-31

## 2024-03-31 RX ORDER — LISINOPRIL 10 MG/1
10 TABLET ORAL DAILY
Qty: 90 TABLET | Refills: 3 | Status: SHIPPED | OUTPATIENT
Start: 2024-03-31

## 2024-04-03 ENCOUNTER — OFFICE VISIT (OUTPATIENT)
Dept: PRIMARY CARE | Facility: CLINIC | Age: 74
End: 2024-04-03
Payer: MEDICARE

## 2024-04-03 VITALS
WEIGHT: 188 LBS | HEIGHT: 69 IN | DIASTOLIC BLOOD PRESSURE: 70 MMHG | BODY MASS INDEX: 27.85 KG/M2 | OXYGEN SATURATION: 95 % | HEART RATE: 75 BPM | SYSTOLIC BLOOD PRESSURE: 114 MMHG

## 2024-04-03 DIAGNOSIS — E11.9 NEW ONSET TYPE 2 DIABETES MELLITUS (MULTI): Primary | ICD-10-CM

## 2024-04-03 DIAGNOSIS — K22.70 BARRETT'S ESOPHAGUS WITHOUT DYSPLASIA: ICD-10-CM

## 2024-04-03 DIAGNOSIS — M25.551 BILATERAL HIP PAIN: ICD-10-CM

## 2024-04-03 DIAGNOSIS — M25.552 BILATERAL HIP PAIN: ICD-10-CM

## 2024-04-03 DIAGNOSIS — I10 BENIGN ESSENTIAL HYPERTENSION: ICD-10-CM

## 2024-04-03 DIAGNOSIS — I73.9 PERIPHERAL VASCULAR DISEASE (CMS-HCC): ICD-10-CM

## 2024-04-03 DIAGNOSIS — G62.89 OTHER POLYNEUROPATHY: ICD-10-CM

## 2024-04-03 PROBLEM — J44.9 CHRONIC OBSTRUCTIVE PULMONARY DISEASE (MULTI): Status: RESOLVED | Noted: 2023-08-31 | Resolved: 2024-04-03

## 2024-04-03 PROCEDURE — 3074F SYST BP LT 130 MM HG: CPT | Performed by: FAMILY MEDICINE

## 2024-04-03 PROCEDURE — 1160F RVW MEDS BY RX/DR IN RCRD: CPT | Performed by: FAMILY MEDICINE

## 2024-04-03 PROCEDURE — 3048F LDL-C <100 MG/DL: CPT | Performed by: FAMILY MEDICINE

## 2024-04-03 PROCEDURE — 4010F ACE/ARB THERAPY RXD/TAKEN: CPT | Performed by: FAMILY MEDICINE

## 2024-04-03 PROCEDURE — 3044F HG A1C LEVEL LT 7.0%: CPT | Performed by: FAMILY MEDICINE

## 2024-04-03 PROCEDURE — 99214 OFFICE O/P EST MOD 30 MIN: CPT | Performed by: FAMILY MEDICINE

## 2024-04-03 PROCEDURE — 1159F MED LIST DOCD IN RCRD: CPT | Performed by: FAMILY MEDICINE

## 2024-04-03 PROCEDURE — 1126F AMNT PAIN NOTED NONE PRSNT: CPT | Performed by: FAMILY MEDICINE

## 2024-04-03 PROCEDURE — 3078F DIAST BP <80 MM HG: CPT | Performed by: FAMILY MEDICINE

## 2024-04-03 RX ORDER — TRIAMCINOLONE ACETONIDE 1 MG/G
1 CREAM TOPICAL 2 TIMES DAILY PRN
Qty: 80 G | Refills: 1 | Status: SHIPPED | OUTPATIENT
Start: 2024-04-03

## 2024-04-03 RX ORDER — METFORMIN HYDROCHLORIDE 500 MG/1
500 TABLET, EXTENDED RELEASE ORAL
Qty: 90 TABLET | Refills: 1 | Status: SHIPPED | OUTPATIENT
Start: 2024-04-03 | End: 2025-04-03

## 2024-04-03 ASSESSMENT — PAIN SCALES - GENERAL: PAINLEVEL: 0-NO PAIN

## 2024-04-03 ASSESSMENT — PATIENT HEALTH QUESTIONNAIRE - PHQ9
SUM OF ALL RESPONSES TO PHQ9 QUESTIONS 1 AND 2: 0
1. LITTLE INTEREST OR PLEASURE IN DOING THINGS: NOT AT ALL
2. FEELING DOWN, DEPRESSED OR HOPELESS: NOT AT ALL

## 2024-04-03 NOTE — PATIENT INSTRUCTIONS
New onset diabetic.  A1c is 6.6.      Pt is here for discussion regarding preventative care for diabetes.   -Recommend yearly foot exams to evaluate for neuropathy (numbness, decreased sensation feet).   -Recommend yearly eye exanms to evaluate for retinopathy (diabetic eye disease).   -Please get routine dental exams - chronic dental conditions can elevated blood glucose. -Recommend to have pneumonia vaccine.     For hip pain - concern for hip arthritis.      For foot - decreased circulation - will need PVR in future.      Blood pressure controlled on lisinopril.      Continue pantoprazole - will need EGD in future for barretts.      Yearly CT of lungs.      Follow up in 3 months.

## 2024-04-03 NOTE — PROGRESS NOTES
"Subjective   Patient ID: Edda Webb is a 74 y.o. female who presents for Diabetes.    Here for follow up.  Sister past away in Dec.  Sister had acute femur fracture.  Work up showed metastatic lung cancer.  Dad had lung cancer.  Sister has hormone negative cancer.  PT had CT of chest 8 months ago - getting yearly CT lung CT.     Pt is having bilateral hip pain - L>R.  Pain in left buttocks and into groin and thigh.   Taking NSAID at times    Hx of shoulder replacement.  ROM is improved.  Pain improved.  Watching heavy lifting.      For DM2:  -A1c: 6.6  -Follow up:   -Hypoglycemic Episodes:  -Glucose monitoring:     GERD:  -F/U: doing well.  No new changes.  No recent follow up with Dr. Quintanilla  -Dysphagia: none   -Hx of EGD: Dr. Quintanilla -2016  -Changes in Bowels: none  -Blood in stool:  none    Neuropathy  -using gabapentin - working well.  Tolerating well.  Symptoms manageable.  No new changes.        Diabetes         Review of Systems    Objective   /70 (BP Location: Right arm, Patient Position: Sitting, BP Cuff Size: Small adult)   Pulse 75   Ht 1.753 m (5' 9\")   Wt 85.3 kg (188 lb)   SpO2 95%   BMI 27.76 kg/m²     Physical Exam  Vitals reviewed.   Constitutional:       General: She is not in acute distress.  Cardiovascular:      Rate and Rhythm: Normal rate and regular rhythm.      Pulses:           Dorsalis pedis pulses are 2+ on the right side and 2+ on the left side.        Posterior tibial pulses are 2+ on the right side and 0 on the left side.   Pulmonary:      Effort: Pulmonary effort is normal.      Breath sounds: No wheezing or rhonchi.   Musculoskeletal:      Right hip: Normal.      Left hip: Decreased range of motion.      Right lower leg: No edema.      Left lower leg: No edema.      Right foot: No deformity.      Left foot: No deformity.      Comments: Pain with int rotation on right   Feet:      Right foot:      Protective Sensation: 5 sites tested.  5 sites sensed.      Skin " integrity: No ulcer.      Left foot:      Protective Sensation: 5 sites tested.  5 sites sensed.      Skin integrity: No ulcer.      Comments: Decreased monofilament bilaterally, Decreased capillary refill on right  Lymphadenopathy:      Cervical: No cervical adenopathy.   Neurological:      Mental Status: She is alert.         Assessment/Plan   Diagnoses and all orders for this visit:  New onset type 2 diabetes mellitus (CMS/HCC)  Bilateral hip pain  Benign essential hypertension  Peripheral vascular disease (CMS/HCC)  Mayers's esophagus without dysplasia  Other orders  -     Follow Up In Primary Care - Established    Patient Instructions   New onset diabetic.  A1c is 6.6.      Pt is here for discussion regarding preventative care for diabetes.   -Recommend yearly foot exams to evaluate for neuropathy (numbness, decreased sensation feet).   -Recommend yearly eye exanms to evaluate for retinopathy (diabetic eye disease).   -Please get routine dental exams - chronic dental conditions can elevated blood glucose. -Recommend to have pneumonia vaccine.     For hip pain - concern for hip arthritis.      For foot - decreased circulation - will need PVR in future.      Blood pressure controlled on lisinopril.      Continue pantoprazole - will need EGD in future for barretts.      Yearly CT of lungs.      Follow up in 3 months.

## 2024-04-04 ENCOUNTER — HOSPITAL ENCOUNTER (OUTPATIENT)
Dept: RADIOLOGY | Facility: HOSPITAL | Age: 74
Discharge: HOME | End: 2024-04-04
Payer: MEDICARE

## 2024-04-04 VITALS — BODY MASS INDEX: 27.85 KG/M2 | WEIGHT: 188 LBS | HEIGHT: 69 IN

## 2024-04-04 DIAGNOSIS — M25.551 BILATERAL HIP PAIN: ICD-10-CM

## 2024-04-04 DIAGNOSIS — M25.552 BILATERAL HIP PAIN: ICD-10-CM

## 2024-04-04 DIAGNOSIS — Z12.31 ENCOUNTER FOR SCREENING MAMMOGRAM FOR BREAST CANCER: ICD-10-CM

## 2024-04-04 PROCEDURE — 73523 X-RAY EXAM HIPS BI 5/> VIEWS: CPT | Mod: RIGHT SIDE | Performed by: RADIOLOGY

## 2024-04-04 PROCEDURE — 73502 X-RAY EXAM HIP UNI 2-3 VIEWS: CPT | Mod: RT

## 2024-04-04 PROCEDURE — 77067 SCR MAMMO BI INCL CAD: CPT | Performed by: RADIOLOGY

## 2024-04-04 PROCEDURE — 77067 SCR MAMMO BI INCL CAD: CPT

## 2024-04-04 PROCEDURE — 73502 X-RAY EXAM HIP UNI 2-3 VIEWS: CPT | Mod: LT

## 2024-04-04 PROCEDURE — 77063 BREAST TOMOSYNTHESIS BI: CPT | Performed by: RADIOLOGY

## 2024-04-05 ENCOUNTER — APPOINTMENT (OUTPATIENT)
Dept: RADIOLOGY | Facility: HOSPITAL | Age: 74
End: 2024-04-05
Payer: MEDICARE

## 2024-04-08 DIAGNOSIS — M25.551 BILATERAL HIP PAIN: Primary | ICD-10-CM

## 2024-04-08 DIAGNOSIS — M25.552 BILATERAL HIP PAIN: Primary | ICD-10-CM

## 2024-04-11 DIAGNOSIS — G60.3 IDIOPATHIC PROGRESSIVE NEUROPATHY: ICD-10-CM

## 2024-04-12 ENCOUNTER — APPOINTMENT (OUTPATIENT)
Dept: RADIOLOGY | Facility: HOSPITAL | Age: 74
End: 2024-04-12
Payer: MEDICARE

## 2024-04-12 RX ORDER — GABAPENTIN 600 MG/1
600 TABLET ORAL 3 TIMES DAILY
Qty: 270 TABLET | Refills: 3 | Status: SHIPPED | OUTPATIENT
Start: 2024-04-12

## 2024-04-29 DIAGNOSIS — E78.2 MIXED HYPERLIPIDEMIA: Primary | ICD-10-CM

## 2024-04-30 ENCOUNTER — HOSPITAL ENCOUNTER (OUTPATIENT)
Dept: CARDIOLOGY | Facility: HOSPITAL | Age: 74
Discharge: HOME | End: 2024-04-30
Payer: MEDICARE

## 2024-04-30 DIAGNOSIS — Z95.0 PRESENCE OF CARDIAC PACEMAKER: ICD-10-CM

## 2024-04-30 DIAGNOSIS — R55 SYNCOPE AND COLLAPSE: ICD-10-CM

## 2024-04-30 PROCEDURE — 93296 REM INTERROG EVL PM/IDS: CPT

## 2024-04-30 RX ORDER — SIMVASTATIN 20 MG/1
20 TABLET, FILM COATED ORAL EVERY EVENING
Qty: 90 TABLET | Refills: 3 | Status: SHIPPED | OUTPATIENT
Start: 2024-04-30

## 2024-05-10 ENCOUNTER — OFFICE VISIT (OUTPATIENT)
Dept: ORTHOPEDIC SURGERY | Facility: CLINIC | Age: 74
End: 2024-05-10
Payer: MEDICARE

## 2024-05-10 DIAGNOSIS — M79.89 LEFT LEG SWELLING: Primary | ICD-10-CM

## 2024-05-10 DIAGNOSIS — M25.551 BILATERAL HIP PAIN: ICD-10-CM

## 2024-05-10 DIAGNOSIS — M25.552 BILATERAL HIP PAIN: ICD-10-CM

## 2024-05-10 PROCEDURE — 1036F TOBACCO NON-USER: CPT | Performed by: ORTHOPAEDIC SURGERY

## 2024-05-10 PROCEDURE — 99204 OFFICE O/P NEW MOD 45 MIN: CPT | Performed by: ORTHOPAEDIC SURGERY

## 2024-05-10 PROCEDURE — 1159F MED LIST DOCD IN RCRD: CPT | Performed by: ORTHOPAEDIC SURGERY

## 2024-05-10 PROCEDURE — 1160F RVW MEDS BY RX/DR IN RCRD: CPT | Performed by: ORTHOPAEDIC SURGERY

## 2024-05-10 RX ORDER — MELOXICAM 15 MG/1
15 TABLET ORAL DAILY
Qty: 60 TABLET | Refills: 0 | Status: SHIPPED | OUTPATIENT
Start: 2024-05-10 | End: 2024-07-09

## 2024-05-10 NOTE — PROGRESS NOTES
This is a consultation from Dr. Lui Lock DO for   Chief Complaint   Patient presents with    Right Hip - Pain    Left Hip - Pain       This is a 74 y.o. female who presents for evaluation of bilateral hip pain left worse than right.  This is a chronic issue for this patient, has been going on for a while but is been recently exacerbated.  She complains of sharp pain in the groin and the posterior hip and buttock.  It radiates into the anterior and lateral thigh.  More significant in her left leg.  No numbness or tingling no fevers or chills.  She has never had any surgery or injections.  She is try taking Tylenol which is not helping much.  She is never done therapy.    Physical Exam    There has been no interval change in this patient's past medical, surgical, medications, allergies, family history or social history since the most recent visit to a provider within our department. 14 point review of systems was performed, reviewed, and negative except for pertinent positives documented in the history of present illness.     Constitutional: well developed, well nourished female in no acute distress  Psychiatric: normal mood, appropriate affect  Eyes: sclera anicteric  HENT: normocephalic/atraumatic  CV: regular rate and rhythm   Respiratory: non labored breathing  Integumentary: no rash  Neurological: moves all extremities    Bilateral hip exam: skin normal, no abrasions, wounds, or lacerations. nttp over greater trochanter. negative log roll, negative chai's test. flexion to 90 degrees without pain. no pain with flexion abduction and external rotation, reproduction of pain with flexion adduction and internal rotation. neurovascularly intact distally        Xrays were ordered by me, they were reviewed and independently interpreted by me today, they show moderate to severe degenerative changes with significant osteophyte formation and protrusio    Procedures      Impression/Plan: This is a 74 y.o. female  with bilateral hip arthritis.  I had an in depth discussion with the patient regarding treatment options for arthritis and their relative risks and benefits. We reviewed surgical and nonsurgical option for treatment. Treatments include anti inflammatory medications, physical therapy, weight loss, activity modification, use of assistive devices, injection therapies. We discussed current prescriptions and risks and benefits of continuation of prescription medication as apporpriate. We discussed that arthritis is often progressive over time, an in end stage arthritis surgical interventions can be considered, including arthroplasty. All questions were answered and the patient voiced their understanding.  Will get her set up with physical therapy and prescription anti-inflammatory.  Discussed the possibility of injections patient would like to defer for now.  I will see her back as needed    BMI Readings from Last 1 Encounters:   04/04/24 27.76 kg/m²      Lab Results   Component Value Date    CREATININE 0.91 03/25/2024     Tobacco Use: Medium Risk (5/10/2024)    Patient History     Smoking Tobacco Use: Former     Smokeless Tobacco Use: Never     Passive Exposure: Not on file      Computed MELD 3.0 unavailable. One or more values for this score either were not found within the given timeframe or did not fit some other criterion.  Computed MELD-Na unavailable. One or more values for this score either were not found within the given timeframe or did not fit some other criterion.       Lab Results   Component Value Date    HGBA1C 6.6 (H) 03/25/2024     Lab Results   Component Value Date    STAPHMRSASCR No Staphylococcus aureus isolated 11/08/2023

## 2024-05-14 ENCOUNTER — OFFICE VISIT (OUTPATIENT)
Dept: CARDIOLOGY | Facility: CLINIC | Age: 74
End: 2024-05-14
Payer: MEDICARE

## 2024-05-14 VITALS
WEIGHT: 188 LBS | DIASTOLIC BLOOD PRESSURE: 76 MMHG | SYSTOLIC BLOOD PRESSURE: 125 MMHG | HEIGHT: 69 IN | HEART RATE: 64 BPM | BODY MASS INDEX: 27.85 KG/M2

## 2024-05-14 DIAGNOSIS — I83.90 ASYMPTOMATIC VARICOSE VEINS: Primary | ICD-10-CM

## 2024-05-14 PROCEDURE — 1159F MED LIST DOCD IN RCRD: CPT | Performed by: INTERNAL MEDICINE

## 2024-05-14 PROCEDURE — 1036F TOBACCO NON-USER: CPT | Performed by: INTERNAL MEDICINE

## 2024-05-14 PROCEDURE — 3078F DIAST BP <80 MM HG: CPT | Performed by: INTERNAL MEDICINE

## 2024-05-14 PROCEDURE — 1160F RVW MEDS BY RX/DR IN RCRD: CPT | Performed by: INTERNAL MEDICINE

## 2024-05-14 PROCEDURE — 3074F SYST BP LT 130 MM HG: CPT | Performed by: INTERNAL MEDICINE

## 2024-05-14 PROCEDURE — 99213 OFFICE O/P EST LOW 20 MIN: CPT | Performed by: INTERNAL MEDICINE

## 2024-05-14 PROCEDURE — 1126F AMNT PAIN NOTED NONE PRSNT: CPT | Performed by: INTERNAL MEDICINE

## 2024-05-14 RX ORDER — AMMONIUM LACTATE 12 G/100G
LOTION TOPICAL AS NEEDED
COMMUNITY
Start: 2024-04-09

## 2024-05-14 ASSESSMENT — PAIN SCALES - GENERAL: PAINLEVEL: 0-NO PAIN

## 2024-05-14 NOTE — PROGRESS NOTES
"Chantel Webb is a 74 y.o. female with hx of DM, Neuropathy, and GERD presented with skin color changes in the lower extremities. The patient noticed purple discoloration of her feet after standing on her feet. She has had varicose veins for many years. She previously worked as a . The patient has associated lower extremity ankle swelling present. Denies any pain in her calves, ulcers/open wounds, or pain over any of her varicosities. The patient was concerned of her foot discoloration which prompted her to see a Vascular specialist.     She had sclerotherapy injections at a Vein Clinic in [place], [place] during the 1990s. She was unable to complete the procedure due to her fear of needles and ended up having a syncopal event. The patient has infrequently worn compression stockings from the Drug store. At night, she sleeps in a bed, but she doesn't elevate her legs.    PMH: DM, Neuropathy, and GERD   PSH: Hysterectomy (02/02/2016); pacemaker placement (Left); Laparoscopic Nissen fundoplication; Wrist fracture surgery (Left); Appendectomy; Breast cyst excision (Left); and Cardiac catheterization  FH: Mother and 3 sisters have varicose veins  SH: denies smoking    Review of Systems  No fevers, chills, weight is stable  No shortness of breath, cough  No chest pain, palpitations  No Abdominal pain, diarrhea, nausea, vomiting,  No headaches, seizures, syncopal events  No hematuria, hematochezia, or melena  +ankle swelling in bilateral lower extremities, +skin discoloration in feet  No paresthesias       Objective   Physical Exam  /76 (BP Location: Right arm, Patient Position: Sitting, BP Cuff Size: Large adult)   Pulse 64   Ht 1.753 m (5' 9\")   Wt 85.3 kg (188 lb)   BMI 27.76 kg/m²      General:  In no acute distress  Neuro: alert and oriented x3  CV:  RRR  Lungs: CTA bilaterally  Abd:  Soft, non-tender   Psych:  Appropriate affect  Upper extremities: No swelling, +2 radial   Lower " extremities: Minimal lower extremity edema around the ankles.  +2 DP; no tenderness to palpation  Skin:  No open ulcers. Reticular and varicose veins present over both lower extremities       Lab Review   Lab Results   Component Value Date     03/25/2024    K 4.5 03/25/2024     03/25/2024    CO2 31 03/25/2024    BUN 18 03/25/2024    CREATININE 0.91 03/25/2024    GLUCOSE 95 03/25/2024    CALCIUM 9.5 03/25/2024     Lab Results   Component Value Date    WBC 5.6 03/25/2024    HGB 13.0 03/25/2024    HCT 41.2 03/25/2024    MCV 92 03/25/2024     03/25/2024         Assessment/Plan   Edda Webb is a 74 y.o. female with hx of varicose veins presented with increased foot discoloration bilaterally. The patient noticed increased foot discoloration while standing which is likely due to blood pooling from incompetent veins. She doesn't wear compression stockings currently. Advised to purchase compression stockings of 15-20 mmHg from [employer]. Will need to exercise to improve her calf muscle function.    Plan:  - Advised to purchase compression stockings of 15-20 mmHg from [employer]  - Start wearing compression stockings during the day and take them off at night.  - Elevate lower extremities at night  - Continue to exercise    Jennifer Navarro  Vascular Medicine Fellow

## 2024-05-14 NOTE — PATIENT INSTRUCTIONS
Please start wearing compressions stockings of 15-20 mmHg. Wear compression stockings during the day and take them off at night.  At night, elevate your legs in the bed.  Continue to exercise  When sitting down, try to keep your legs elevated to prevent leg swelling.  Compression stockings can be bought at [employer]. [facility].

## 2024-05-28 ENCOUNTER — HOSPITAL ENCOUNTER (OUTPATIENT)
Dept: CARDIOLOGY | Facility: HOSPITAL | Age: 74
Discharge: HOME | End: 2024-05-28
Payer: MEDICARE

## 2024-05-28 DIAGNOSIS — R55 SYNCOPE AND COLLAPSE: ICD-10-CM

## 2024-05-28 DIAGNOSIS — Z95.0 PRESENCE OF CARDIAC PACEMAKER: ICD-10-CM

## 2024-05-28 DIAGNOSIS — R55 SYNCOPE AND COLLAPSE: Primary | ICD-10-CM

## 2024-06-11 ENCOUNTER — HOSPITAL ENCOUNTER (OUTPATIENT)
Dept: RADIOLOGY | Facility: CLINIC | Age: 74
Discharge: HOME | End: 2024-06-11
Payer: MEDICARE

## 2024-06-11 ENCOUNTER — OFFICE VISIT (OUTPATIENT)
Dept: ORTHOPEDIC SURGERY | Facility: CLINIC | Age: 74
End: 2024-06-11
Payer: MEDICARE

## 2024-06-11 DIAGNOSIS — M19.011 OSTEOARTHRITIS OF RIGHT SHOULDER, UNSPECIFIED OSTEOARTHRITIS TYPE: ICD-10-CM

## 2024-06-11 PROCEDURE — 1160F RVW MEDS BY RX/DR IN RCRD: CPT | Performed by: ORTHOPAEDIC SURGERY

## 2024-06-11 PROCEDURE — 99213 OFFICE O/P EST LOW 20 MIN: CPT | Performed by: ORTHOPAEDIC SURGERY

## 2024-06-11 PROCEDURE — 73030 X-RAY EXAM OF SHOULDER: CPT | Mod: RIGHT SIDE | Performed by: RADIOLOGY

## 2024-06-11 PROCEDURE — 73030 X-RAY EXAM OF SHOULDER: CPT | Mod: RT

## 2024-06-11 PROCEDURE — 1036F TOBACCO NON-USER: CPT | Performed by: ORTHOPAEDIC SURGERY

## 2024-06-11 PROCEDURE — 1159F MED LIST DOCD IN RCRD: CPT | Performed by: ORTHOPAEDIC SURGERY

## 2024-06-11 PROCEDURE — 1125F AMNT PAIN NOTED PAIN PRSNT: CPT | Performed by: ORTHOPAEDIC SURGERY

## 2024-06-11 ASSESSMENT — ENCOUNTER SYMPTOMS
WHEEZING: 0
FATIGUE: 0
RHINORRHEA: 0
TROUBLE SWALLOWING: 0
COLOR CHANGE: 0
SHORTNESS OF BREATH: 0
CHILLS: 0
ARTHRALGIAS: 1
FEVER: 0

## 2024-06-11 ASSESSMENT — PAIN - FUNCTIONAL ASSESSMENT: PAIN_FUNCTIONAL_ASSESSMENT: 0-10

## 2024-06-11 ASSESSMENT — PAIN SCALES - GENERAL: PAINLEVEL_OUTOF10: 3

## 2024-06-11 NOTE — PROGRESS NOTES
Reason for Appointment  Chief Complaint   Patient presents with    Right Shoulder - Follow-up, Post-op     History of Present Illness  Patient is a 74 y.o. female here today for follow-up evaluation of 7 months status post right reverse shoulder replacement.  She is doing well, just some stiffness with reaching overhead.  X-rays taken today are reviewed and look excellent, no loosening of hardware.  She continues to work on stretching and home exercises and is slowly doing more with this arm.  No other changes in her past medical history, allergies, or medications.     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    Hyperlipidemia     Hyperlipidemia     Hypertension     Hypertension     Neuropathy     Other abnormal glucose 08/08/2016    Other abnormal glucose    Other conditions influencing health status     No significant past medical history    Pain in right shoulder 02/02/2016    Right shoulder pain    Pain in right shoulder 02/02/2016    Right shoulder pain    Pathological fracture due to osteoporosis 01/10/2019    Peripheral neuropathy     Personal history of diseases of the skin and subcutaneous tissue 08/08/2016    History of solar lentigo    Personal history of other diseases of the nervous system and sense organs 02/05/2016    History of polyneuropathy    Personal history of other specified conditions 08/08/2016    History of shortness of breath    Pneumonia     8/2023    Primary stabbing headache 08/08/2016    Primary stabbing headache       Past Surgical History:   Procedure Laterality Date    APPENDECTOMY      BREAST CYST EXCISION Left      CARDIAC CATHETERIZATION      HYSTERECTOMY  02/02/2016    Hysterectomy    LAPAROSCOPIC NISSEN FUNDOPLICATION      PACEMAKER PLACEMENT Left     TOTAL SHOULDER ARTHROPLASTY Right 11/2023    WRIST FRACTURE SURGERY Left     with hardware       Medication Documentation Review Audit       Reviewed by Debi Stover PA-C (Physician Assistant) on 06/11/24 at 1343      Medication Order Taking? Sig Documenting Provider Last Dose Status   acetaminophen (Tylenol 8 HOUR) 650 mg ER tablet [id number] Yes Take 2 tablets (1,300 mg) by mouth if needed. Do not crush, chew, or split. Historical Provider, MD Taking Active   albuterol (Proventil HFA) 90 mcg/actuation inhaler [id number] Yes 2 puffs every 6 hours if needed for shortness of breath. Historical Provider, MD Taking Active   ammonium lactate (Lac-Hydrin) 12 % lotion [id number] Yes Apply topically if needed. Historical Provider, MD Taking Active   aspirin 81 mg EC tablet [id number] Yes Take 1 tablet (81 mg) by mouth once daily. Historical Provider, MD Taking Active   gabapentin (Neurontin) 600 mg tablet [id number] Yes TAKE 1 TABLET BY MOUTH 3 TIMES  DAILY Lui Lock, DO Taking Active   lisinopril 10 mg tablet [id number] Yes TAKE 1 TABLET BY MOUTH DAILY Lui Lock, DO Taking Active   loratadine (Claritin) 10 mg tablet [id number] Yes Take 1 tablet (10 mg) by mouth once daily. Terri Chino, APRN-CNP Taking Active   meloxicam (Mobic) 15 mg tablet [id number] Yes Take 1 tablet (15 mg) by mouth once daily. Edil Stewart MD Taking Active   metFORMIN XR (Glucophage-XR) 500 mg 24 hr tablet [id number] Yes Take 1 tablet (500 mg) by mouth once daily with breakfast. Do not crush, chew, or split. Lui Lock, DO Taking Active   multivit-minerals/folic acid (CENTRUM ADULT 50 PLUS ORAL) [id number] Yes Take 1 tablet by mouth once daily. Historical Provider, MD Taking Active   omega 3-dha-epa-fish oil (Fish OiL) 1,000 mg (120 mg-180 mg) capsule [id number] Yes Take 1 capsule (1,000  mg) by mouth once daily. Historical Provider, MD Taking Active   pantoprazole (ProtoNix) 40 mg EC tablet [id number] Yes TAKE 1 TABLET BY MOUTH DAILY Lui Lock, DO Taking Active   PAPAYA ENZYME ORAL [id number] Yes Take 2 tablets by mouth 2 times a day. Historical Provider, MD Taking Active   simvastatin (Zocor) 20 mg tablet [id number] Yes TAKE 1 TABLET BY MOUTH ONCE  DAILY IN THE EVENING Lui Lock, DO Taking Active   triamcinolone (Kenalog) 0.1 % cream [id number] Yes Apply 1 Application topically 2 times a day as needed for irritation. Apply to affected area Lui Lock, DO Taking Active                    Allergies   Allergen Reactions    Codeine Hives and Rash       Review of Systems   Constitutional:  Negative for chills, fatigue and fever.   HENT:  Negative for ear pain, rhinorrhea and trouble swallowing.    Respiratory:  Negative for shortness of breath and wheezing.    Cardiovascular:  Negative for chest pain and leg swelling.   Musculoskeletal:  Positive for arthralgias.   Skin:  Negative for color change and pallor.     Exam   On exam the right shoulder shows a well-healed wound with no swelling.  She has stiffness reaching over 110 degrees of active forward flexion.  Deltoid is functional, no crepitation movement of the shoulder.  Good pulses and sensation in the upper extremity.    Assessment   Encounter Diagnosis   Name Primary?    Osteoarthritis of right shoulder, unspecified osteoarthritis type        Plan   She can continue to work on stretching and motion.  She understands slowly increasing activity and being careful with heavy lifting.  She can follow-up with us at her 1 year anniversary.    Written by Debi Bear saw, evaluated, and treated the patient with the PA

## 2024-07-03 ENCOUNTER — HOSPITAL ENCOUNTER (OUTPATIENT)
Dept: CARDIOLOGY | Facility: HOSPITAL | Age: 74
Discharge: HOME | End: 2024-07-03
Payer: MEDICARE

## 2024-07-03 DIAGNOSIS — R55 SYNCOPE AND COLLAPSE: ICD-10-CM

## 2024-07-26 ENCOUNTER — APPOINTMENT (OUTPATIENT)
Dept: PRIMARY CARE | Facility: CLINIC | Age: 74
End: 2024-07-26
Payer: MEDICARE

## 2024-07-30 ENCOUNTER — OFFICE VISIT (OUTPATIENT)
Dept: PRIMARY CARE | Facility: CLINIC | Age: 74
End: 2024-07-30
Payer: MEDICARE

## 2024-07-30 VITALS
SYSTOLIC BLOOD PRESSURE: 122 MMHG | DIASTOLIC BLOOD PRESSURE: 80 MMHG | TEMPERATURE: 98.2 F | HEART RATE: 59 BPM | OXYGEN SATURATION: 98 % | BODY MASS INDEX: 27.2 KG/M2 | WEIGHT: 184.2 LBS

## 2024-07-30 DIAGNOSIS — E11.9 NEW ONSET TYPE 2 DIABETES MELLITUS (MULTI): Primary | ICD-10-CM

## 2024-07-30 DIAGNOSIS — M16.0 BILATERAL PRIMARY OSTEOARTHRITIS OF HIP: ICD-10-CM

## 2024-07-30 DIAGNOSIS — K22.70 BARRETT'S ESOPHAGUS WITHOUT DYSPLASIA: ICD-10-CM

## 2024-07-30 DIAGNOSIS — L82.1 SEBORRHEIC KERATOSIS: ICD-10-CM

## 2024-07-30 DIAGNOSIS — I10 BENIGN ESSENTIAL HYPERTENSION: ICD-10-CM

## 2024-07-30 LAB — POC HEMOGLOBIN A1C: 6 % (ref 4.2–6.5)

## 2024-07-30 PROCEDURE — 3079F DIAST BP 80-89 MM HG: CPT | Performed by: FAMILY MEDICINE

## 2024-07-30 PROCEDURE — 1126F AMNT PAIN NOTED NONE PRSNT: CPT | Performed by: FAMILY MEDICINE

## 2024-07-30 PROCEDURE — 3074F SYST BP LT 130 MM HG: CPT | Performed by: FAMILY MEDICINE

## 2024-07-30 PROCEDURE — 83036 HEMOGLOBIN GLYCOSYLATED A1C: CPT | Performed by: FAMILY MEDICINE

## 2024-07-30 PROCEDURE — 1123F ACP DISCUSS/DSCN MKR DOCD: CPT | Performed by: FAMILY MEDICINE

## 2024-07-30 PROCEDURE — 99214 OFFICE O/P EST MOD 30 MIN: CPT | Performed by: FAMILY MEDICINE

## 2024-07-30 PROCEDURE — 3048F LDL-C <100 MG/DL: CPT | Performed by: FAMILY MEDICINE

## 2024-07-30 PROCEDURE — 3044F HG A1C LEVEL LT 7.0%: CPT | Performed by: FAMILY MEDICINE

## 2024-07-30 PROCEDURE — 1036F TOBACCO NON-USER: CPT | Performed by: FAMILY MEDICINE

## 2024-07-30 PROCEDURE — 4010F ACE/ARB THERAPY RXD/TAKEN: CPT | Performed by: FAMILY MEDICINE

## 2024-07-30 PROCEDURE — 1159F MED LIST DOCD IN RCRD: CPT | Performed by: FAMILY MEDICINE

## 2024-07-30 PROCEDURE — 1158F ADVNC CARE PLAN TLK DOCD: CPT | Performed by: FAMILY MEDICINE

## 2024-07-30 RX ORDER — CYCLOBENZAPRINE HCL 5 MG
5-10 TABLET ORAL NIGHTLY PRN
Qty: 60 TABLET | Refills: 2 | Status: SHIPPED | OUTPATIENT
Start: 2024-07-30 | End: 2024-09-28

## 2024-07-30 ASSESSMENT — LIFESTYLE VARIABLES
HOW OFTEN DO YOU HAVE SIX OR MORE DRINKS ON ONE OCCASION: NEVER
HOW OFTEN DO YOU HAVE A DRINK CONTAINING ALCOHOL: 4 OR MORE TIMES A WEEK
AUDIT-C TOTAL SCORE: 4
HOW MANY STANDARD DRINKS CONTAINING ALCOHOL DO YOU HAVE ON A TYPICAL DAY: 1 OR 2
SKIP TO QUESTIONS 9-10: 1

## 2024-07-30 ASSESSMENT — ENCOUNTER SYMPTOMS
LOSS OF SENSATION IN FEET: 0
DEPRESSION: 0
OCCASIONAL FEELINGS OF UNSTEADINESS: 0

## 2024-07-30 ASSESSMENT — PAIN SCALES - GENERAL: PAINLEVEL: 0-NO PAIN

## 2024-07-30 NOTE — PROGRESS NOTES
Subjective   Patient ID: Edda Webb is a 74 y.o. female who presents for Diabetes.    Today:   Vascular  -Pt saw vascular specialist - pt was told due to veins.  Pt was told to wear compression stockings.  No other visits.     Hip  - Hip pain - no role for surgery.  Recommend physical therapy.  Did not start anti-inflammatory.    -Specialist: Dr. Stewart    Shoulder:  Hx of shoulder replacement.  ROM is improved.  Pain improved.   -Specialist: Dr. Bear    For DM2:  -A1c: 6  -Follow up: Doing well.  Down 4 pounds.  Tolerating metformin well.  Neuropathy controlled with gabapentin  -Hypoglycemic Episodes:  -Glucose monitoring:     GERD:  -F/U: doing well.  No new changes.  No recent follow up with Dr. Quintanilla  -Dysphagia: none   -Hx of EGD: Dr. Quintanilla -2016  -Changes in Bowels: none  -Blood in stool:  none    Neuropathy  -using gabapentin - working well.  Tolerating well.  Symptoms manageable.  No new changes.        Diabetes         Review of Systems    Objective   /80 (BP Location: Right arm, Patient Position: Sitting)   Pulse 59   Temp 36.8 °C (98.2 °F) (Temporal)   Wt 83.6 kg (184 lb 3.2 oz)   SpO2 98%   BMI 27.20 kg/m²     Physical Exam  Vitals reviewed.   Constitutional:       General: She is not in acute distress.  Cardiovascular:      Rate and Rhythm: Normal rate and regular rhythm.   Pulmonary:      Effort: Pulmonary effort is normal.      Breath sounds: No wheezing or rhonchi.   Musculoskeletal:      Right lower leg: No edema.      Left lower leg: No edema.   Lymphadenopathy:      Cervical: No cervical adenopathy.   Neurological:      Mental Status: She is alert.         Assessment/Plan   Diagnoses and all orders for this visit:  New onset type 2 diabetes mellitus (Multi)  -     POCT glycosylated hemoglobin (Hb A1C) manually resulted  Seborrheic keratosis  Mayers's esophagus without dysplasia  Benign essential hypertension  Bilateral primary osteoarthritis of hip    Patient Instructions    For Dm2 - A1c 6.0 - continue metformin    For shoulder - improving.    For barretts - continue pantoprazole.  Consider EGD next year.     For hip pain - arthritis in hips.  Recommend therapy in the fall.  Recommend flexeril at night.  Continue gabapentin.  Continue tylenol.      Blood pressure well controlled.  Continue lisionpril.    Recheck in 4 months.

## 2024-07-30 NOTE — PATIENT INSTRUCTIONS
For Dm2 - A1c 6.0 - continue metformin    For shoulder - improving.    For barretts - continue pantoprazole.  Consider EGD next year.     For hip pain - arthritis in hips.  Recommend therapy in the fall.  Recommend flexeril at night.  Continue gabapentin.  Continue tylenol.      Blood pressure well controlled.  Continue lisionpril.    Recheck in 4 months.

## 2024-08-07 ENCOUNTER — APPOINTMENT (OUTPATIENT)
Dept: CARDIOLOGY | Facility: HOSPITAL | Age: 74
End: 2024-08-07
Payer: MEDICARE

## 2024-08-08 ENCOUNTER — HOSPITAL ENCOUNTER (OUTPATIENT)
Dept: CARDIOLOGY | Facility: HOSPITAL | Age: 74
Discharge: HOME | End: 2024-08-08
Payer: MEDICARE

## 2024-08-08 DIAGNOSIS — R55 SYNCOPE AND COLLAPSE: Primary | ICD-10-CM

## 2024-08-08 DIAGNOSIS — R55 SYNCOPE AND COLLAPSE: ICD-10-CM

## 2024-08-08 PROCEDURE — 93296 REM INTERROG EVL PM/IDS: CPT

## 2024-08-12 ENCOUNTER — HOSPITAL ENCOUNTER (OUTPATIENT)
Dept: CARDIOLOGY | Facility: HOSPITAL | Age: 74
Discharge: HOME | End: 2024-08-12
Payer: MEDICARE

## 2024-08-12 DIAGNOSIS — R55 SYNCOPE AND COLLAPSE: ICD-10-CM

## 2024-08-22 DIAGNOSIS — E11.9 NEW ONSET TYPE 2 DIABETES MELLITUS (MULTI): ICD-10-CM

## 2024-08-23 RX ORDER — METFORMIN HYDROCHLORIDE 500 MG/1
TABLET, EXTENDED RELEASE ORAL
Qty: 90 TABLET | Refills: 3 | Status: SHIPPED | OUTPATIENT
Start: 2024-08-23

## 2024-09-10 ENCOUNTER — HOSPITAL ENCOUNTER (OUTPATIENT)
Dept: CARDIOLOGY | Facility: HOSPITAL | Age: 74
Discharge: HOME | End: 2024-09-10
Payer: MEDICARE

## 2024-09-10 DIAGNOSIS — R55 SYNCOPE AND COLLAPSE: ICD-10-CM

## 2024-09-10 DIAGNOSIS — Z95.0 PRESENCE OF CARDIAC PACEMAKER: ICD-10-CM

## 2024-10-01 ENCOUNTER — TELEPHONE (OUTPATIENT)
Dept: PRIMARY CARE | Facility: CLINIC | Age: 74
End: 2024-10-01
Payer: MEDICARE

## 2024-10-01 NOTE — TELEPHONE ENCOUNTER
Pt is calling about fungus on R/ L great toe she had a virtual appt and was told to call her pcp for an appt to have her toes scraped and sent out to test for liver damage, Do we do that or does she need a referral  to Podiatry  [phone]

## 2024-10-09 ENCOUNTER — HOSPITAL ENCOUNTER (OUTPATIENT)
Dept: CARDIOLOGY | Facility: HOSPITAL | Age: 74
Discharge: HOME | End: 2024-10-09
Payer: MEDICARE

## 2024-10-09 DIAGNOSIS — R55 SYNCOPE AND COLLAPSE: ICD-10-CM

## 2024-11-12 ENCOUNTER — APPOINTMENT (OUTPATIENT)
Dept: ORTHOPEDIC SURGERY | Facility: CLINIC | Age: 74
End: 2024-11-12
Payer: MEDICARE

## 2024-11-13 ENCOUNTER — HOSPITAL ENCOUNTER (OUTPATIENT)
Dept: CARDIOLOGY | Facility: HOSPITAL | Age: 74
Discharge: HOME | End: 2024-11-13
Payer: MEDICARE

## 2024-11-13 DIAGNOSIS — R55 SYNCOPE AND COLLAPSE: ICD-10-CM

## 2024-11-13 PROCEDURE — 93296 REM INTERROG EVL PM/IDS: CPT

## 2024-11-19 ENCOUNTER — HOSPITAL ENCOUNTER (OUTPATIENT)
Dept: RADIOLOGY | Facility: CLINIC | Age: 74
Discharge: HOME | End: 2024-11-19
Payer: MEDICARE

## 2024-11-19 ENCOUNTER — OFFICE VISIT (OUTPATIENT)
Dept: ORTHOPEDIC SURGERY | Facility: CLINIC | Age: 74
End: 2024-11-19
Payer: MEDICARE

## 2024-11-19 DIAGNOSIS — M19.011 OSTEOARTHRITIS OF RIGHT SHOULDER, UNSPECIFIED OSTEOARTHRITIS TYPE: ICD-10-CM

## 2024-11-19 PROCEDURE — 4010F ACE/ARB THERAPY RXD/TAKEN: CPT | Performed by: ORTHOPAEDIC SURGERY

## 2024-11-19 PROCEDURE — 1159F MED LIST DOCD IN RCRD: CPT | Performed by: ORTHOPAEDIC SURGERY

## 2024-11-19 PROCEDURE — 3044F HG A1C LEVEL LT 7.0%: CPT | Performed by: ORTHOPAEDIC SURGERY

## 2024-11-19 PROCEDURE — 1036F TOBACCO NON-USER: CPT | Performed by: ORTHOPAEDIC SURGERY

## 2024-11-19 PROCEDURE — 3048F LDL-C <100 MG/DL: CPT | Performed by: ORTHOPAEDIC SURGERY

## 2024-11-19 PROCEDURE — 99213 OFFICE O/P EST LOW 20 MIN: CPT | Performed by: ORTHOPAEDIC SURGERY

## 2024-11-19 PROCEDURE — 73030 X-RAY EXAM OF SHOULDER: CPT | Mod: RIGHT SIDE | Performed by: RADIOLOGY

## 2024-11-19 PROCEDURE — 1125F AMNT PAIN NOTED PAIN PRSNT: CPT | Performed by: ORTHOPAEDIC SURGERY

## 2024-11-19 PROCEDURE — 73030 X-RAY EXAM OF SHOULDER: CPT | Mod: RT

## 2024-11-19 PROCEDURE — 1160F RVW MEDS BY RX/DR IN RCRD: CPT | Performed by: ORTHOPAEDIC SURGERY

## 2024-11-19 ASSESSMENT — ENCOUNTER SYMPTOMS
BRUISES/BLEEDS EASILY: 0
SHORTNESS OF BREATH: 0
FATIGUE: 0
WHEEZING: 0
CHILLS: 0
ARTHRALGIAS: 1
FEVER: 0

## 2024-11-19 ASSESSMENT — PAIN - FUNCTIONAL ASSESSMENT: PAIN_FUNCTIONAL_ASSESSMENT: 0-10

## 2024-11-19 ASSESSMENT — PAIN SCALES - GENERAL: PAINLEVEL_OUTOF10: 2

## 2024-11-19 NOTE — PROGRESS NOTES
Reason for Appointment  Chief Complaint   Patient presents with    Right Shoulder - Follow-up, Post-op     History of Present Illness  Patient is a 74 y.o. female here today for follow-up evaluation of the right shoulder. We last saw her on 6/11/24 when she was 7 months status post right reverse shoulder replacement and she was doing well at that time with just some overhead stiffness and we had her continue to work on stretching and motion. X-rays today were reviewed and look excellent and show no signs of loosening. Today she reports some anterior pain. She sleeps on the right side, if she doesn't her right side aches. No recent falls or injuries. No other changes in past medical history, allergies, or medications.      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    Hyperlipidemia     Hyperlipidemia     Hypertension     Hypertension     Neuropathy     Other abnormal glucose 08/08/2016    Other abnormal glucose    Other conditions influencing health status     No significant past medical history    Pain in right shoulder 02/02/2016    Right shoulder pain    Pain in right shoulder 02/02/2016    Right shoulder pain    Pathological fracture due to osteoporosis 01/10/2019    Peripheral neuropathy     Personal history of diseases of the skin and subcutaneous tissue 08/08/2016    History of solar lentigo    Personal history of other diseases of the nervous system and sense organs 02/05/2016    History of polyneuropathy    Personal history of other specified conditions 08/08/2016    History of shortness of breath    Pneumonia     8/2023    Primary stabbing headache  2016    Primary stabbing headache       Past Surgical History:   Procedure Laterality Date    APPENDECTOMY      BREAST CYST EXCISION Left     CARDIAC CATHETERIZATION      HYSTERECTOMY  2016    Hysterectomy    LAPAROSCOPIC NISSEN FUNDOPLICATION      PACEMAKER PLACEMENT Left     TOTAL SHOULDER ARTHROPLASTY Right 2023    WRIST FRACTURE SURGERY Left     with hardware       Medication Documentation Review Audit       Reviewed by Kim Alvarado MA (Medical Assistant) on 24 at 1000      Medication Order Taking? Sig Documenting Provider Last Dose Status   acetaminophen (Tylenol 8 HOUR) 650 mg ER tablet [id number] Yes Take 2 tablets (1,300 mg) by mouth if needed. Do not crush, chew, or split. Historical Provider, MD  Active   albuterol (Proventil HFA) 90 mcg/actuation inhaler [id number] Yes 2 puffs every 6 hours if needed for shortness of breath. Historical Provider, MD  Active   ammonium lactate (Lac-Hydrin) 12 % lotion [id number] Yes Apply topically if needed. Historical Provider, MD  Active   aspirin 81 mg EC tablet [id number] Yes Take 1 tablet (81 mg) by mouth once daily. Historical Provider, MD  Active   gabapentin (Neurontin) 600 mg tablet [id number] Yes TAKE 1 TABLET BY MOUTH 3 TIMES  DAILY Lui Lock, DO  Active   lisinopril 10 mg tablet [id number] Yes TAKE 1 TABLET BY MOUTH DAILY Lui Lock, DO  Active   loratadine (Claritin) 10 mg tablet [id number]  Take 1 tablet (10 mg) by mouth once daily. Terri Chino, APRN-CNP   10/02/24 2359   metFORMIN  mg 24 hr tablet [id number] Yes TAKE 1 TABLET BY MOUTH ONCE  DAILY WITH BREAKFAST DO NOT  CRUSH, CHEW, OR SPLIT Lui Lock, DO  Active   multivit-minerals/folic acid (CENTRUM ADULT 50 PLUS ORAL) [id number] Yes Take 1 tablet by mouth once daily. Historical Provider, MD  Active   omega 3-dha-epa-fish oil (Fish OiL) 1,000 mg (120 mg-180 mg) capsule [id number] Yes Take 1 capsule (1,000 mg) by mouth once daily. Historical Provider, MD   Active   pantoprazole (ProtoNix) 40 mg EC tablet [id number] Yes TAKE 1 TABLET BY MOUTH DAILY Lui Lock, DO  Active   PAPAYA ENZYME ORAL [id number] Yes Take 2 tablets by mouth 2 times a day. Historical Provider, MD  Active   simvastatin (Zocor) 20 mg tablet [id number] Yes TAKE 1 TABLET BY MOUTH ONCE  DAILY IN THE EVENING Lui Lock, DO  Active   triamcinolone (Kenalog) 0.1 % cream [id number] Yes Apply 1 Application topically 2 times a day as needed for irritation. Apply to affected area Luialden Lock DO  Active                    Allergies   Allergen Reactions    Codeine Hives and Rash       Review of Systems   Constitutional:  Negative for chills, fatigue and fever.   Respiratory:  Negative for shortness of breath and wheezing.    Cardiovascular:  Negative for chest pain and leg swelling.   Musculoskeletal:  Positive for arthralgias.   Allergic/Immunologic: Negative for immunocompromised state.   Hematological:  Does not bruise/bleed easily.       Exam   No acromial or scapular tenderness. Some anterior tenderness over the incision site. No effusion. Elevates over 140 degrees. Good deltoid function. Good pulses and sensation.    Assessment   Encounter Diagnosis   Name Primary?    Osteoarthritis of right shoulder, unspecified osteoarthritis type        Plan     We discussed using Voltaren gel. We discussed sitting with an abduction pillow. We discussed sleeping with a body pillow and sleeping on the left side. Follow up in 6 months      I, Erna Ham, attest that this documentation has been prepared under the direction and in the presence of Rome Bear MD.   By signing below, Rome FORDE MD, personally performed the services described in this documentation. All medical record entries made by the scribe were at my direction and in my presence. I have reviewed the chart and agree that the record reflects my personal performance and is accurate and complete.

## 2024-11-22 DIAGNOSIS — I10 BENIGN ESSENTIAL HYPERTENSION: ICD-10-CM

## 2024-11-22 DIAGNOSIS — K21.9 GERD WITHOUT ESOPHAGITIS: ICD-10-CM

## 2024-11-24 RX ORDER — PANTOPRAZOLE SODIUM 40 MG/1
40 TABLET, DELAYED RELEASE ORAL DAILY
Qty: 100 TABLET | Refills: 2 | Status: SHIPPED | OUTPATIENT
Start: 2024-11-24

## 2024-11-24 RX ORDER — LISINOPRIL 10 MG/1
10 TABLET ORAL DAILY
Qty: 100 TABLET | Refills: 2 | Status: SHIPPED | OUTPATIENT
Start: 2024-11-24

## 2024-11-30 DIAGNOSIS — E78.2 MIXED HYPERLIPIDEMIA: ICD-10-CM

## 2024-12-01 RX ORDER — SIMVASTATIN 20 MG/1
20 TABLET, FILM COATED ORAL EVERY EVENING
Qty: 100 TABLET | Refills: 2 | Status: SHIPPED | OUTPATIENT
Start: 2024-12-01

## 2024-12-11 ENCOUNTER — HOSPITAL ENCOUNTER (OUTPATIENT)
Dept: CARDIOLOGY | Facility: HOSPITAL | Age: 74
Discharge: HOME | End: 2024-12-11
Payer: MEDICARE

## 2024-12-11 DIAGNOSIS — R55 SYNCOPE AND COLLAPSE: ICD-10-CM

## 2024-12-12 DIAGNOSIS — G60.3 IDIOPATHIC PROGRESSIVE NEUROPATHY: ICD-10-CM

## 2024-12-13 RX ORDER — GABAPENTIN 600 MG/1
600 TABLET ORAL 3 TIMES DAILY
Qty: 300 TABLET | Refills: 2 | Status: SHIPPED | OUTPATIENT
Start: 2024-12-13

## 2024-12-20 ENCOUNTER — APPOINTMENT (OUTPATIENT)
Dept: PRIMARY CARE | Facility: CLINIC | Age: 74
End: 2024-12-20
Payer: MEDICARE

## 2024-12-30 ENCOUNTER — OFFICE VISIT (OUTPATIENT)
Dept: PRIMARY CARE | Facility: CLINIC | Age: 74
End: 2024-12-30
Payer: MEDICARE

## 2024-12-30 VITALS
OXYGEN SATURATION: 95 % | BODY MASS INDEX: 27.44 KG/M2 | SYSTOLIC BLOOD PRESSURE: 120 MMHG | HEART RATE: 76 BPM | TEMPERATURE: 98.4 F | WEIGHT: 185.8 LBS | DIASTOLIC BLOOD PRESSURE: 78 MMHG

## 2024-12-30 DIAGNOSIS — I10 BENIGN ESSENTIAL HYPERTENSION: ICD-10-CM

## 2024-12-30 DIAGNOSIS — E11.9 TYPE 2 DIABETES MELLITUS WITHOUT COMPLICATION, WITHOUT LONG-TERM CURRENT USE OF INSULIN (MULTI): ICD-10-CM

## 2024-12-30 DIAGNOSIS — K22.70 BARRETT'S ESOPHAGUS WITHOUT DYSPLASIA: ICD-10-CM

## 2024-12-30 DIAGNOSIS — M16.0 BILATERAL PRIMARY OSTEOARTHRITIS OF HIP: ICD-10-CM

## 2024-12-30 DIAGNOSIS — Z00.00 ROUTINE GENERAL MEDICAL EXAMINATION AT HEALTH CARE FACILITY: Primary | ICD-10-CM

## 2024-12-30 LAB — POC HEMOGLOBIN A1C: 5.9 % (ref 4.2–6.5)

## 2024-12-30 PROCEDURE — 4010F ACE/ARB THERAPY RXD/TAKEN: CPT | Performed by: FAMILY MEDICINE

## 2024-12-30 PROCEDURE — 1036F TOBACCO NON-USER: CPT | Performed by: FAMILY MEDICINE

## 2024-12-30 PROCEDURE — 1126F AMNT PAIN NOTED NONE PRSNT: CPT | Performed by: FAMILY MEDICINE

## 2024-12-30 PROCEDURE — G0439 PPPS, SUBSEQ VISIT: HCPCS | Performed by: FAMILY MEDICINE

## 2024-12-30 PROCEDURE — 1159F MED LIST DOCD IN RCRD: CPT | Performed by: FAMILY MEDICINE

## 2024-12-30 PROCEDURE — 99214 OFFICE O/P EST MOD 30 MIN: CPT | Performed by: FAMILY MEDICINE

## 2024-12-30 PROCEDURE — 1170F FXNL STATUS ASSESSED: CPT | Performed by: FAMILY MEDICINE

## 2024-12-30 PROCEDURE — 3044F HG A1C LEVEL LT 7.0%: CPT | Performed by: FAMILY MEDICINE

## 2024-12-30 PROCEDURE — 3074F SYST BP LT 130 MM HG: CPT | Performed by: FAMILY MEDICINE

## 2024-12-30 PROCEDURE — 99215 OFFICE O/P EST HI 40 MIN: CPT | Performed by: FAMILY MEDICINE

## 2024-12-30 PROCEDURE — 1160F RVW MEDS BY RX/DR IN RCRD: CPT | Performed by: FAMILY MEDICINE

## 2024-12-30 PROCEDURE — 3078F DIAST BP <80 MM HG: CPT | Performed by: FAMILY MEDICINE

## 2024-12-30 PROCEDURE — 83036 HEMOGLOBIN GLYCOSYLATED A1C: CPT | Performed by: FAMILY MEDICINE

## 2024-12-30 PROCEDURE — 3048F LDL-C <100 MG/DL: CPT | Performed by: FAMILY MEDICINE

## 2024-12-30 RX ORDER — TRAMADOL HYDROCHLORIDE 50 MG/1
50 TABLET ORAL DAILY PRN
Qty: 15 TABLET | Refills: 1 | Status: SHIPPED | OUTPATIENT
Start: 2024-12-30 | End: 2025-01-14

## 2024-12-30 ASSESSMENT — ACTIVITIES OF DAILY LIVING (ADL)
MANAGING_FINANCES: INDEPENDENT
GROCERY_SHOPPING: INDEPENDENT
TAKING_MEDICATION: INDEPENDENT
DOING_HOUSEWORK: INDEPENDENT
DRESSING: INDEPENDENT
BATHING: INDEPENDENT

## 2024-12-30 ASSESSMENT — ENCOUNTER SYMPTOMS
LOSS OF SENSATION IN FEET: 0
DEPRESSION: 0
OCCASIONAL FEELINGS OF UNSTEADINESS: 0

## 2024-12-30 ASSESSMENT — LIFESTYLE VARIABLES
SKIP TO QUESTIONS 9-10: 1
HOW OFTEN DO YOU HAVE A DRINK CONTAINING ALCOHOL: 4 OR MORE TIMES A WEEK
HOW OFTEN DO YOU HAVE SIX OR MORE DRINKS ON ONE OCCASION: NEVER
HOW MANY STANDARD DRINKS CONTAINING ALCOHOL DO YOU HAVE ON A TYPICAL DAY: 1 OR 2
AUDIT-C TOTAL SCORE: 4

## 2024-12-30 ASSESSMENT — PAIN SCALES - GENERAL: PAINLEVEL_OUTOF10: 0-NO PAIN

## 2024-12-30 NOTE — PATIENT INSTRUCTIONS
Here for physical.  Recommend influenza and RSV.  Recommend blood work prior to next visit    For diabetes - A1c is 5.9.  Continue metformin    For barretts - avoid ibuprofen and aspirin.  Continue pantoprazole    For hip arthritis - discussed options.  Recommend tramadol as needed for pain.  Discussed risk and benefits - this would be safer for the stomach and kidneys    Follow up in 6 months.

## 2024-12-30 NOTE — PROGRESS NOTES
Subjective   Reason for Visit: Edda Webb is an 74 y.o. female here for a Medicare Wellness visit.     Past Medical, Surgical, and Family History reviewed and updated in chart.    Reviewed all medications by prescribing practitioner or clinical pharmacist (such as prescriptions, OTCs, herbal therapies and supplements) and documented in the medical record.    Patient is here medicare physical.      Hip Pain  -Sx's: Patient is having worsening symptoms.  Limiting her walking.  Pt is considering virtual therapy.   -Duration: chronic   -Evaluation: xrays  -Treatment:  Tylenol, aspirin - helped.    -Specialist:    For DM2:  -A1c: 5.9  -Follow up: metformin is working well.  Tolerating well.  Still has neuropathy - improved.    -Hypoglycemic Episodes: none  -Glucose monitoring:      GERD:  -F/U: doing well.  No new changes.  No recent follow up with Dr. Quintanilla  -Dysphagia: none   -Hx of EGD: Dr. Quintanilla -2016  -Changes in Bowels: none  -Blood in stool:  none          Diabetes        Patient Care Team:  Lui Lock DO as PCP - General  Lui Lock DO as PCP - [employer] PCP  Rome Bear MD as Consulting Physician (Orthopaedic Surgery)  Flor Quintanilla MD MPH as Surgeon (Bariatrics)  Darya Trejo MD as Referring Physician (Dermatology)     Review of Systems    Objective   Vitals:  /78 (BP Location: Right arm, Patient Position: Sitting)   Pulse 76   Temp 36.9 °C (98.4 °F) (Temporal)   Wt 84.3 kg (185 lb 12.8 oz)   SpO2 95%   BMI 27.44 kg/m²       Physical Exam  Vitals reviewed.   Constitutional:       General: She is not in acute distress.  Cardiovascular:      Rate and Rhythm: Normal rate and regular rhythm.   Pulmonary:      Effort: Pulmonary effort is normal.      Breath sounds: No wheezing or rhonchi.   Musculoskeletal:      Right lower leg: No edema.      Left lower leg: No edema.   Lymphadenopathy:      Cervical: No cervical adenopathy.   Neurological:      Mental Status:  She is alert.         Assessment & Plan  Routine general medical examination at health care facility         Type 2 diabetes mellitus without complication, without long-term current use of insulin (Multi)    Orders:    POCT glycosylated hemoglobin (Hb A1C) manually resulted       Patient Instructions   Here for physical.  Recommend influenza and RSV.  Recommend blood work prior to next visit    For diabetes - A1c is 5.9.  Continue metformin    For barretts - avoid ibuprofen and aspirin.  Continue pantoprazole    For hip arthritis - discussed options.  Recommend tramadol as needed for pain.  Discussed risk and benefits - this would be safer for the stomach and kidneys    Follow up in 6 months.

## 2025-01-05 DIAGNOSIS — G60.3 IDIOPATHIC PROGRESSIVE NEUROPATHY: ICD-10-CM

## 2025-01-05 DIAGNOSIS — E11.9 NEW ONSET TYPE 2 DIABETES MELLITUS (MULTI): ICD-10-CM

## 2025-01-05 DIAGNOSIS — E78.2 MIXED HYPERLIPIDEMIA: ICD-10-CM

## 2025-01-05 DIAGNOSIS — K21.9 GERD WITHOUT ESOPHAGITIS: ICD-10-CM

## 2025-01-05 DIAGNOSIS — I10 BENIGN ESSENTIAL HYPERTENSION: ICD-10-CM

## 2025-01-05 DIAGNOSIS — M16.0 BILATERAL PRIMARY OSTEOARTHRITIS OF HIP: ICD-10-CM

## 2025-01-06 RX ORDER — LISINOPRIL 10 MG/1
10 TABLET ORAL DAILY
Qty: 90 TABLET | Refills: 3 | Status: SHIPPED | OUTPATIENT
Start: 2025-01-06 | End: 2026-01-06

## 2025-01-06 RX ORDER — METFORMIN HYDROCHLORIDE 500 MG/1
500 TABLET, EXTENDED RELEASE ORAL
Qty: 90 TABLET | Refills: 3 | Status: SHIPPED | OUTPATIENT
Start: 2025-01-06 | End: 2026-01-06

## 2025-01-06 RX ORDER — SIMVASTATIN 20 MG/1
20 TABLET, FILM COATED ORAL NIGHTLY
Qty: 90 TABLET | Refills: 3 | Status: SHIPPED | OUTPATIENT
Start: 2025-01-06 | End: 2026-01-06

## 2025-01-06 RX ORDER — GABAPENTIN 600 MG/1
600 TABLET ORAL 3 TIMES DAILY
Qty: 270 TABLET | Refills: 3 | Status: SHIPPED | OUTPATIENT
Start: 2025-01-06 | End: 2026-01-06

## 2025-01-06 RX ORDER — TRAMADOL HYDROCHLORIDE 50 MG/1
50 TABLET ORAL EVERY 8 HOURS PRN
Qty: 21 TABLET | Refills: 0 | Status: SHIPPED | OUTPATIENT
Start: 2025-01-06 | End: 2025-01-13

## 2025-01-06 RX ORDER — PANTOPRAZOLE SODIUM 40 MG/1
40 TABLET, DELAYED RELEASE ORAL
Qty: 90 TABLET | Refills: 3 | Status: SHIPPED | OUTPATIENT
Start: 2025-01-06 | End: 2026-01-06

## 2025-01-06 RX ORDER — CYCLOBENZAPRINE HCL 5 MG
5 TABLET ORAL 2 TIMES DAILY PRN
Qty: 60 TABLET | Refills: 2 | Status: SHIPPED | OUTPATIENT
Start: 2025-01-06

## 2025-01-14 ENCOUNTER — HOSPITAL ENCOUNTER (OUTPATIENT)
Dept: CARDIOLOGY | Facility: HOSPITAL | Age: 75
Discharge: HOME | End: 2025-01-14
Payer: MEDICARE

## 2025-01-14 DIAGNOSIS — R55 SYNCOPE AND COLLAPSE: ICD-10-CM

## 2025-01-14 DIAGNOSIS — R55 SYNCOPE AND COLLAPSE: Primary | ICD-10-CM

## 2025-01-22 ENCOUNTER — HOSPITAL ENCOUNTER (EMERGENCY)
Facility: HOSPITAL | Age: 75
Discharge: HOME | End: 2025-01-22
Attending: EMERGENCY MEDICINE
Payer: MEDICARE

## 2025-01-22 VITALS
RESPIRATION RATE: 18 BRPM | DIASTOLIC BLOOD PRESSURE: 87 MMHG | HEART RATE: 76 BPM | TEMPERATURE: 97.9 F | WEIGHT: 185 LBS | SYSTOLIC BLOOD PRESSURE: 178 MMHG | HEIGHT: 69 IN | OXYGEN SATURATION: 97 % | BODY MASS INDEX: 27.4 KG/M2

## 2025-01-22 DIAGNOSIS — M54.42 ACUTE LEFT-SIDED LOW BACK PAIN WITH LEFT-SIDED SCIATICA: Primary | ICD-10-CM

## 2025-01-22 LAB
APPEARANCE UR: CLEAR
BILIRUB UR STRIP.AUTO-MCNC: NEGATIVE MG/DL
COLOR UR: NORMAL
GLUCOSE UR STRIP.AUTO-MCNC: NORMAL MG/DL
HOLD SPECIMEN: NORMAL
KETONES UR STRIP.AUTO-MCNC: NEGATIVE MG/DL
LEUKOCYTE ESTERASE UR QL STRIP.AUTO: NEGATIVE
NITRITE UR QL STRIP.AUTO: NEGATIVE
PH UR STRIP.AUTO: 5.5 [PH]
PROT UR STRIP.AUTO-MCNC: NEGATIVE MG/DL
RBC # UR STRIP.AUTO: NEGATIVE /UL
SP GR UR STRIP.AUTO: 1.01
UROBILINOGEN UR STRIP.AUTO-MCNC: NORMAL MG/DL

## 2025-01-22 PROCEDURE — 81003 URINALYSIS AUTO W/O SCOPE: CPT | Performed by: EMERGENCY MEDICINE

## 2025-01-22 PROCEDURE — 2500000004 HC RX 250 GENERAL PHARMACY W/ HCPCS (ALT 636 FOR OP/ED): Performed by: EMERGENCY MEDICINE

## 2025-01-22 PROCEDURE — 96372 THER/PROPH/DIAG INJ SC/IM: CPT | Performed by: EMERGENCY MEDICINE

## 2025-01-22 PROCEDURE — 99284 EMERGENCY DEPT VISIT MOD MDM: CPT | Performed by: EMERGENCY MEDICINE

## 2025-01-22 RX ORDER — IBUPROFEN 600 MG/1
600 TABLET ORAL EVERY 8 HOURS PRN
Qty: 15 TABLET | Refills: 0 | Status: SHIPPED | OUTPATIENT
Start: 2025-01-22 | End: 2025-01-27

## 2025-01-22 RX ORDER — ORPHENADRINE CITRATE 30 MG/ML
60 INJECTION INTRAMUSCULAR; INTRAVENOUS ONCE
Status: COMPLETED | OUTPATIENT
Start: 2025-01-22 | End: 2025-01-22

## 2025-01-22 RX ORDER — KETOROLAC TROMETHAMINE 30 MG/ML
15 INJECTION, SOLUTION INTRAMUSCULAR; INTRAVENOUS ONCE
Status: COMPLETED | OUTPATIENT
Start: 2025-01-22 | End: 2025-01-22

## 2025-01-22 RX ORDER — CYCLOBENZAPRINE HCL 5 MG
5 TABLET ORAL 3 TIMES DAILY PRN
Qty: 15 TABLET | Refills: 0 | Status: SHIPPED | OUTPATIENT
Start: 2025-01-22 | End: 2025-01-27

## 2025-01-22 RX ADMIN — KETOROLAC TROMETHAMINE 15 MG: 30 INJECTION, SOLUTION INTRAMUSCULAR at 12:27

## 2025-01-22 RX ADMIN — ORPHENADRINE CITRATE 60 MG: 60 INJECTION INTRAMUSCULAR; INTRAVENOUS at 12:28

## 2025-01-22 ASSESSMENT — PAIN DESCRIPTION - FREQUENCY: FREQUENCY: CONSTANT/CONTINUOUS

## 2025-01-22 ASSESSMENT — PAIN - FUNCTIONAL ASSESSMENT: PAIN_FUNCTIONAL_ASSESSMENT: 0-10

## 2025-01-22 ASSESSMENT — PAIN DESCRIPTION - LOCATION: LOCATION: BACK

## 2025-01-22 ASSESSMENT — COLUMBIA-SUICIDE SEVERITY RATING SCALE - C-SSRS
6. HAVE YOU EVER DONE ANYTHING, STARTED TO DO ANYTHING, OR PREPARED TO DO ANYTHING TO END YOUR LIFE?: NO
2. HAVE YOU ACTUALLY HAD ANY THOUGHTS OF KILLING YOURSELF?: NO
1. IN THE PAST MONTH, HAVE YOU WISHED YOU WERE DEAD OR WISHED YOU COULD GO TO SLEEP AND NOT WAKE UP?: NO

## 2025-01-22 ASSESSMENT — PAIN SCALES - GENERAL: PAINLEVEL_OUTOF10: 8

## 2025-01-22 NOTE — ED PROVIDER NOTES
HPI   Chief Complaint   Patient presents with    Back Pain       Patient presents emergency department secondary to back pain.  Patient has had back pain since Friday.  She says it has gradually worsened.  It is left-sided back pain.  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  She has no loss of bowel or bladder control.  Some urinary frequency but no urgency or dysuria.  No blood in the urine.  No calf pain or swelling.  No history of similar symptoms.  No direct falls or traumas.  No paresthesia.  No chest pain or shortness of breath.  No cough or congestion.  No abdominal pain.  No nausea or vomiting.  No change in bowel movements.  No other complaints at this time.                        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    Hyperlipidemia     Hyperlipidemia     Hypertension     Hypertension     Neuropathy     Other abnormal glucose 08/08/2016    Other abnormal glucose    Other conditions influencing health status     No significant past medical history    Pain in right shoulder 02/02/2016    Right shoulder pain    Pain in right shoulder 02/02/2016    Right shoulder pain    Pathological fracture due to osteoporosis 01/10/2019    Peripheral neuropathy     Personal history of diseases of the skin and subcutaneous tissue 08/08/2016    History of solar lentigo    Personal history of other diseases of the nervous system and sense organs 02/05/2016    History of polyneuropathy    Personal history of other specified  conditions 08/08/2016    History of shortness of breath    Pneumonia     8/2023    Primary stabbing headache 08/08/2016    Primary stabbing headache     Past Surgical History:   Procedure Laterality Date    APPENDECTOMY      BREAST CYST EXCISION Left     CARDIAC CATHETERIZATION      HYSTERECTOMY  02/02/2016    Hysterectomy    LAPAROSCOPIC NISSEN FUNDOPLICATION      PACEMAKER PLACEMENT Left     TOTAL SHOULDER ARTHROPLASTY Right 11/2023    WRIST FRACTURE SURGERY Left     with hardware     Family History   Problem Relation Name Age of Onset    Heart disease Mother      Hypertension Mother      Other (cardiac disorder) Mother      Diabetes Mother      Lung cancer Father      Breast cancer Sister      Hypertension Sister      Diabetes Sister      Cancer Sister      Cancer Brother      Breast cancer Mother's Sister       Social History     Tobacco Use    Smoking status: Former     Current packs/day: 1.00     Average packs/day: 1 pack/day for 43.0 years (43.0 ttl pk-yrs)     Types: Cigarettes    Smokeless tobacco: Never    Tobacco comments:     Quit 25 yrs ago   Vaping Use    Vaping status: Never Used   Substance Use Topics    Alcohol use: Yes     Alcohol/week: 17.0 standard drinks of alcohol     Types: 7 Glasses of wine, 10 Standard drinks or equivalent per week    Drug use: Never       Physical Exam   ED Triage Vitals [01/22/25 1145]   Temperature Heart Rate Respirations BP   36.6 °C (97.9 °F) 76 18 178/87      Pulse Ox Temp Source Heart Rate Source Patient Position   97 % Temporal -- --      BP Location FiO2 (%)     -- --       Physical Exam  Constitutional:       Appearance: Normal appearance. She is not ill-appearing.   HENT:      Head: Normocephalic.      Nose: Nose normal.      Mouth/Throat:      Mouth: Mucous membranes are moist.   Eyes:      Extraocular Movements: Extraocular movements intact.      Pupils: Pupils are equal, round, and reactive to light.   Cardiovascular:      Rate and Rhythm: Normal rate and  regular rhythm.      Pulses: Normal pulses.      Heart sounds: Normal heart sounds.   Pulmonary:      Effort: Pulmonary effort is normal.      Breath sounds: Normal breath sounds.   Abdominal:      General: Abdomen is flat. Bowel sounds are normal.      Palpations: Abdomen is soft.      Tenderness: There is no abdominal tenderness.   Musculoskeletal:         General: Normal range of motion.      Cervical back: Normal range of motion. No tenderness.      Comments: Patient with left paraspinal tenderness to palpation over the SI joint.   Skin:     General: Skin is warm and dry.      Capillary Refill: Capillary refill takes less than 2 seconds.   Neurological:      General: No focal deficit present.      Mental Status: She is alert and oriented to person, place, and time.   Psychiatric:         Mood and Affect: Mood normal.         Behavior: Behavior normal.     Labs Reviewed - No data to display  Pain Management Panel           No data to display              No orders to display     ED Course & MDM   Diagnoses as of 01/22/25 1324   Acute left-sided low back pain with left-sided sciatica       Medical Decision Making  Patient presents secondary to back pain.  Patient's physical exam is consistent with radiculopathy.  She does have some urinary frequency so urinalysis will also be obtained.  She is given IM Norflex and Toradol.  After this she does have improvement in her symptoms.  She is ambulating without difficulty.  Urinalysis is negative for infection.  No hematuria.  Patient is prescribed medication to use as needed for pain control at home.  She is to follow-up with her primary care physician for reevaluation and additional treatment if no improvement.  Patient is stable for discharge at this time.        Procedure  Procedures     Lydia Zendejas MD  01/23/25 [id number]

## 2025-01-23 ENCOUNTER — TELEPHONE (OUTPATIENT)
Dept: PRIMARY CARE | Facility: CLINIC | Age: 75
End: 2025-01-23
Payer: MEDICARE

## 2025-01-23 DIAGNOSIS — M16.0 BILATERAL PRIMARY OSTEOARTHRITIS OF HIP: Primary | ICD-10-CM

## 2025-01-23 RX ORDER — PREDNISONE 10 MG/1
TABLET ORAL
Qty: 21 TABLET | Refills: 0 | Status: SHIPPED | OUTPATIENT
Start: 2025-01-23 | End: 2025-02-01

## 2025-01-23 NOTE — TELEPHONE ENCOUNTER
Patient called stating that she went to the ER because she couldn't walk. They didn't do any scans and told her it was her sciatic. She said that she went to the Chiropractor and he told her that it was not sciatic that there was something wrong with her hip. She said that pain was so severe that she couldn't walk or stand up straight which is why she went. Wants to know what you suggest for her to do. She doesn't want the pain to get that severe again. Please advise.

## 2025-01-27 ENCOUNTER — HOSPITAL ENCOUNTER (OUTPATIENT)
Dept: RADIOLOGY | Facility: HOSPITAL | Age: 75
Discharge: HOME | End: 2025-01-27
Payer: MEDICARE

## 2025-01-27 ENCOUNTER — OFFICE VISIT (OUTPATIENT)
Dept: ORTHOPEDIC SURGERY | Facility: CLINIC | Age: 75
End: 2025-01-27
Payer: MEDICARE

## 2025-01-27 DIAGNOSIS — M16.12 PRIMARY OSTEOARTHRITIS OF LEFT HIP: ICD-10-CM

## 2025-01-27 DIAGNOSIS — M25.552 LEFT HIP PAIN: Primary | ICD-10-CM

## 2025-01-27 DIAGNOSIS — M25.552 LEFT HIP PAIN: ICD-10-CM

## 2025-01-27 PROCEDURE — 1160F RVW MEDS BY RX/DR IN RCRD: CPT

## 2025-01-27 PROCEDURE — 99213 OFFICE O/P EST LOW 20 MIN: CPT

## 2025-01-27 PROCEDURE — 1159F MED LIST DOCD IN RCRD: CPT

## 2025-01-27 PROCEDURE — 4010F ACE/ARB THERAPY RXD/TAKEN: CPT

## 2025-01-27 PROCEDURE — 73502 X-RAY EXAM HIP UNI 2-3 VIEWS: CPT | Mod: LEFT SIDE | Performed by: RADIOLOGY

## 2025-01-27 PROCEDURE — 73502 X-RAY EXAM HIP UNI 2-3 VIEWS: CPT | Mod: LT

## 2025-01-27 PROCEDURE — 1036F TOBACCO NON-USER: CPT

## 2025-01-27 ASSESSMENT — PAIN SCALES - GENERAL: PAINLEVEL_OUTOF10: 5 - MODERATE PAIN

## 2025-01-27 ASSESSMENT — PAIN - FUNCTIONAL ASSESSMENT: PAIN_FUNCTIONAL_ASSESSMENT: 0-10

## 2025-01-27 NOTE — PROGRESS NOTES
History of Present Illness  Edda Webb is a 75 y.o.s female, accompanied by , presenting for evaluation of side: left hip pain for the past 6 months. Seen in ER 01/22/2025 for sciatic pain and received pain and antiinflammatory injection into bilateral shoulders with prescribed steroid pack with relief. Sx started with walking and localized to left anterior hip radiating into groin. Described as moderate. Since increased with rising after sitting and walking. Improved with pain medication and ice. has tried NSAID, Steriods used and beneficial. has had similar sx in the past. Has gone to the chiropractor for readjustment and seen seen some relief. has not tried therapy for the pain.  Denies trauma/fall. Denies numbness, tingling, f/c, n/v, CP, SOB, or any other complaints/concerns.      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    Hyperlipidemia     Hyperlipidemia     Hypertension     Hypertension     Neuropathy     Other abnormal glucose 08/08/2016    Other abnormal glucose    Other conditions influencing health status     No significant past medical history    Pain in right shoulder 02/02/2016    Right shoulder pain    Pain in right shoulder 02/02/2016    Right shoulder pain    Pathological fracture due to osteoporosis 01/10/2019    Peripheral neuropathy     Personal history of diseases of the skin and subcutaneous tissue 08/08/2016    History of solar lentigo    Personal history of other diseases of the nervous system and sense organs 02/05/2016    History of polyneuropathy    Personal history of other specified conditions 08/08/2016     History of shortness of breath    Pneumonia     2023    Primary stabbing headache 2016    Primary stabbing headache       Medication Documentation Review Audit       Reviewed by Viviana Ivory MA (Medical Assistant) on 25 at 1057      Medication Order Taking? Sig Documenting Provider Last Dose Status   acetaminophen (Tylenol 8 HOUR) 650 mg ER tablet [id number] No Take 2 tablets (1,300 mg) by mouth if needed. Do not crush, chew, or split. Historical Provider, MD Taking Active   albuterol (Proventil HFA) 90 mcg/actuation inhaler [id number] No 2 puffs every 6 hours if needed for shortness of breath. Historical Provider, MD Taking Active   ammonium lactate (Lac-Hydrin) 12 % lotion [id number] No Apply topically if needed. Historical Provider, MD Taking Active   aspirin 81 mg EC tablet [id number]  Take 1 tablet (81 mg) by mouth once daily. Historical Provider, MD  Active     Discontinued 25 1324   cyclobenzaprine (Flexeril) 5 mg tablet [id number]  Take 1 tablet (5 mg) by mouth 3 times a day as needed for muscle spasms for up to 5 days. Lydia Zendejas MD  Active   gabapentin (Neurontin) 600 mg tablet [id number]  Take 1 tablet (600 mg) by mouth 3 times a day. Lui Lock, DO  Active   ibuprofen 600 mg tablet [id number]  Take 1 tablet (600 mg) by mouth every 8 hours if needed for mild pain (1 - 3) for up to 5 days. Lydia Zendejas MD  Active   lisinopril 10 mg tablet [id number]  Take 1 tablet (10 mg) by mouth once daily. Lui Lock DO  Active   loratadine (Claritin) 10 mg tablet [id number] No Take 1 tablet (10 mg) by mouth once daily. Terri Chino, APRN-CNP Taking  10/02/24 2215   metFORMIN  mg 24 hr tablet [id number]  Take 1 tablet (500 mg) by mouth once daily in the evening. Take with meals. Lui Lock DO  Active   multivit-minerals/folic acid (CENTRUM ADULT 50 PLUS ORAL) [id number] No Take 1 tablet by mouth once daily. Historical Provider, MD Taking Active   omega  3-dha-epa-fish oil (Fish OiL) 1,000 mg (120 mg-180 mg) capsule [id number] No Take 1 capsule (1,000 mg) by mouth once daily. Historical Provider, MD Taking Active   pantoprazole (ProtoNix) 40 mg EC tablet [id number]  Take 1 tablet (40 mg) by mouth once daily in the morning. Take before meals. Lui Lock DO  Active   PAPAYA ENZYME ORAL [id number] No Take 2 tablets by mouth 2 times a day. Historical Provider, MD Taking Active   predniSONE (Deltasone) 10 mg tablet [id number]  Take 4 tablets (40 mg) by mouth once daily for 3 days, THEN 2 tablets (20 mg) once daily for 3 days, THEN 1 tablet (10 mg) once daily for 3 days. Lui Lock DO  Active   simvastatin (Zocor) 20 mg tablet [id number]  Take 1 tablet (20 mg) by mouth once daily at bedtime. Lui Lock DO  Active   triamcinolone (Kenalog) 0.1 % cream [id number] No Apply 1 Application topically 2 times a day as needed for irritation. Apply to affected area Lui Lock DO Taking Active                    Allergies   Allergen Reactions    Codeine Hives and Rash       Social History     Socioeconomic History    Marital status:      Spouse name: Not on file    Number of children: Not on file    Years of education: Not on file    Highest education level: Not on file   Occupational History    Not on file   Tobacco Use    Smoking status: Former     Current packs/day: 1.00     Average packs/day: 1 pack/day for 43.0 years (43.0 ttl pk-yrs)     Types: Cigarettes    Smokeless tobacco: Never    Tobacco comments:     Quit 25 yrs ago   Vaping Use    Vaping status: Never Used   Substance and Sexual Activity    Alcohol use: Yes     Alcohol/week: 17.0 standard drinks of alcohol     Types: 7 Glasses of wine, 10 Standard drinks or equivalent per week    Drug use: Never    Sexual activity: Defer   Other Topics Concern    Not on file   Social History Narrative    Not on file     Social Drivers of Health     Financial Resource Strain: Low Risk  (11/15/2023)    Overall  Financial Resource Strain (CARDIA)     Difficulty of Paying Living Expenses: Not hard at all   Food Insecurity: No Food Insecurity (11/15/2023)    Hunger Vital Sign     Worried About Running Out of Food in the Last Year: Never true     Ran Out of Food in the Last Year: Never true   Transportation Needs: No Transportation Needs (11/15/2023)    PRAPARE - Transportation     Lack of Transportation (Medical): No     Lack of Transportation (Non-Medical): No   Physical Activity: Inactive (11/15/2023)    Exercise Vital Sign     Days of Exercise per Week: 0 days     Minutes of Exercise per Session: 0 min   Stress: No Stress Concern Present (11/15/2023)    [employer] - Occupational Stress Questionnaire     Feeling of Stress : Not at all   Social Connections: Moderately Isolated (11/15/2023)    Social Connection and Isolation Panel [NHANES]     Frequency of Communication with Friends and Family: More than three times a week     Frequency of Social Gatherings with Friends and Family: More than three times a week     Attends Zoroastrian Services: Never     Active Member of Clubs or Organizations: No     Attends Club or Organization Meetings: Never     Marital Status:    Intimate Partner Violence: Not At Risk (11/15/2023)    Humiliation, Afraid, Rape, and Kick questionnaire     Fear of Current or Ex-Partner: No     Emotionally Abused: No     Physically Abused: No     Sexually Abused: No   Housing Stability: Low Risk  (11/15/2023)    Housing Stability Vital Sign     Unable to Pay for Housing in the Last Year: No     Number of Places Lived in the Last Year: 1     Unstable Housing in the Last Year: No       Past Surgical History:   Procedure Laterality Date    APPENDECTOMY      BREAST CYST EXCISION Left     CARDIAC CATHETERIZATION      HYSTERECTOMY  02/02/2016    Hysterectomy    LAPAROSCOPIC NISSEN FUNDOPLICATION      PACEMAKER PLACEMENT Left     TOTAL SHOULDER ARTHROPLASTY Right 11/2023    WRIST  FRACTURE SURGERY Left     with hardware        Review of Systems   30 point ROS reviewed and negative other than as listed in the HPI.      Exam  Gen: The pt is A&Ox3, NAD, and appear state age and weight  Psychiatric: mood and affect are appropriate   Eyes: sclera are white, EOM grossly intact  ENT: MMM  Neck: supple, thyroid is midline  Respiratory: respirations are nonlabored, chest rise symmetric  CV: rate is regular by palpation of distal pulses  Abdomen: nondistended   Integument: no obvious cutaneous lesions noted. No signs of lymphangitis. No signs of systemic edema.   MSK:  Hip Musculoskeletal Exam  Gait    Gait is normal.    Inspection    Right        Right hip inspection is normal.    Palpation    Right        Right hip palpation is normal.    Range of Motion    Left      Active ROM: pain.        Passive ROM: pain.        Active extension: 35.       Passive extension: 35.       Active flexion: 100.       Passive flexion: 100.       Active internal rotation: 30.       Passive internal rotation: 30.       Active external rotation: 30.       Passive external rotation: 30.       Active adduction: 30.       Passive adduction: 30.       Active abduction: 30.       Passive abduction: 30.     Strength    Left      Left hip strength is normal.     Neurovascular    Left        Left hip neurovascular exam is normal.    Special Tests    Left      ALY test (left): positive       Imaging:  I personally reviewed multiple views of the left hip were obtained in the office today demonstrate left hip osteoarthritis with joint space narrowing and osteophyte formation.  No acute fracture or dislocation.     Assessment:  left DJD    Plan:  We discussed conservative treatment with OTC analgesics as needed, complete prescribed oral steroids. PT referral. May continue to see chiropractor.  Discussed with patient treatment of steroid injection into the hip if pain control or improvement seen with PT.  Can enter guided injection  should she decide to try that, particularly after done with oral steroids.  Follow up with joints provider if no improvement seen with physical therapy    Natural history reviewed. All of the pt questions/concerns addressed and they are in agreement with the plan.

## 2025-01-31 ENCOUNTER — APPOINTMENT (OUTPATIENT)
Dept: RADIOLOGY | Facility: HOSPITAL | Age: 75
End: 2025-01-31
Payer: MEDICARE

## 2025-01-31 ENCOUNTER — HOSPITAL ENCOUNTER (EMERGENCY)
Facility: HOSPITAL | Age: 75
Discharge: HOME | End: 2025-01-31
Payer: MEDICARE

## 2025-01-31 VITALS
TEMPERATURE: 98 F | WEIGHT: 185 LBS | HEART RATE: 82 BPM | SYSTOLIC BLOOD PRESSURE: 157 MMHG | OXYGEN SATURATION: 96 % | DIASTOLIC BLOOD PRESSURE: 82 MMHG | RESPIRATION RATE: 16 BRPM | BODY MASS INDEX: 27.4 KG/M2 | HEIGHT: 69 IN

## 2025-01-31 DIAGNOSIS — S43.005A SHOULDER DISLOCATION, LEFT, INITIAL ENCOUNTER: Primary | ICD-10-CM

## 2025-01-31 PROCEDURE — 96375 TX/PRO/DX INJ NEW DRUG ADDON: CPT

## 2025-01-31 PROCEDURE — 73200 CT UPPER EXTREMITY W/O DYE: CPT | Mod: LEFT SIDE | Performed by: RADIOLOGY

## 2025-01-31 PROCEDURE — 2500000004 HC RX 250 GENERAL PHARMACY W/ HCPCS (ALT 636 FOR OP/ED): Mod: JZ

## 2025-01-31 PROCEDURE — 73060 X-RAY EXAM OF HUMERUS: CPT | Mod: LT

## 2025-01-31 PROCEDURE — 73070 X-RAY EXAM OF ELBOW: CPT | Mod: LEFT SIDE | Performed by: RADIOLOGY

## 2025-01-31 PROCEDURE — 2500000004 HC RX 250 GENERAL PHARMACY W/ HCPCS (ALT 636 FOR OP/ED): Performed by: EMERGENCY MEDICINE

## 2025-01-31 PROCEDURE — 23650 CLTX SHO DSLC W/MNPJ WO ANES: CPT | Mod: LT

## 2025-01-31 PROCEDURE — 96374 THER/PROPH/DIAG INJ IV PUSH: CPT

## 2025-01-31 PROCEDURE — 73060 X-RAY EXAM OF HUMERUS: CPT | Mod: LEFT SIDE | Performed by: RADIOLOGY

## 2025-01-31 PROCEDURE — 99285 EMERGENCY DEPT VISIT HI MDM: CPT

## 2025-01-31 PROCEDURE — 73030 X-RAY EXAM OF SHOULDER: CPT | Mod: LEFT SIDE | Performed by: RADIOLOGY

## 2025-01-31 PROCEDURE — 73200 CT UPPER EXTREMITY W/O DYE: CPT | Mod: LT

## 2025-01-31 PROCEDURE — 73070 X-RAY EXAM OF ELBOW: CPT | Mod: LT

## 2025-01-31 PROCEDURE — 73030 X-RAY EXAM OF SHOULDER: CPT | Mod: LT

## 2025-01-31 PROCEDURE — 99152 MOD SED SAME PHYS/QHP 5/>YRS: CPT | Performed by: EMERGENCY MEDICINE

## 2025-01-31 RX ORDER — PROPOFOL 10 MG/ML
INJECTION, EMULSION INTRAVENOUS
Status: DISCONTINUED
Start: 2025-01-31 | End: 2025-02-01 | Stop reason: HOSPADM

## 2025-01-31 RX ORDER — TRAMADOL HYDROCHLORIDE 50 MG/1
50 TABLET ORAL EVERY 8 HOURS PRN
Status: DISCONTINUED | OUTPATIENT
Start: 2025-01-31 | End: 2025-02-01 | Stop reason: HOSPADM

## 2025-01-31 RX ORDER — PROPOFOL 10 MG/ML
0.5 INJECTION, EMULSION INTRAVENOUS AS NEEDED
Status: DISCONTINUED | OUTPATIENT
Start: 2025-01-31 | End: 2025-02-01 | Stop reason: HOSPADM

## 2025-01-31 RX ORDER — PROPOFOL 10 MG/ML
1 INJECTION, EMULSION INTRAVENOUS ONCE
Status: COMPLETED | OUTPATIENT
Start: 2025-01-31 | End: 2025-01-31

## 2025-01-31 RX ORDER — FENTANYL CITRATE 50 UG/ML
50 INJECTION, SOLUTION INTRAMUSCULAR; INTRAVENOUS ONCE
Status: COMPLETED | OUTPATIENT
Start: 2025-01-31 | End: 2025-01-31

## 2025-01-31 RX ORDER — OXYCODONE HYDROCHLORIDE 5 MG/1
5 TABLET ORAL EVERY 6 HOURS PRN
Qty: 6 TABLET | Refills: 0 | Status: SHIPPED | OUTPATIENT
Start: 2025-01-31

## 2025-01-31 RX ORDER — HYDROMORPHONE HYDROCHLORIDE 1 MG/ML
1 INJECTION, SOLUTION INTRAMUSCULAR; INTRAVENOUS; SUBCUTANEOUS ONCE
Status: COMPLETED | OUTPATIENT
Start: 2025-01-31 | End: 2025-01-31

## 2025-01-31 RX ORDER — OXYCODONE AND ACETAMINOPHEN 5; 325 MG/1; MG/1
1 TABLET ORAL ONCE
Status: DISCONTINUED | OUTPATIENT
Start: 2025-01-31 | End: 2025-02-01 | Stop reason: HOSPADM

## 2025-01-31 RX ADMIN — HYDROMORPHONE HYDROCHLORIDE 1 MG: 1 INJECTION, SOLUTION INTRAMUSCULAR; INTRAVENOUS; SUBCUTANEOUS at 17:23

## 2025-01-31 RX ADMIN — FENTANYL CITRATE 50 MCG: 50 INJECTION INTRAMUSCULAR; INTRAVENOUS at 16:15

## 2025-01-31 RX ADMIN — PROPOFOL 70 MG: 10 INJECTION, EMULSION INTRAVENOUS at 20:48

## 2025-01-31 ASSESSMENT — PAIN DESCRIPTION - DESCRIPTORS: DESCRIPTORS: ACHING

## 2025-01-31 ASSESSMENT — PAIN DESCRIPTION - LOCATION
LOCATION: SHOULDER

## 2025-01-31 ASSESSMENT — PAIN - FUNCTIONAL ASSESSMENT: PAIN_FUNCTIONAL_ASSESSMENT: 0-10

## 2025-01-31 ASSESSMENT — PAIN SCALES - GENERAL
PAINLEVEL_OUTOF10: 10 - WORST POSSIBLE PAIN

## 2025-01-31 ASSESSMENT — PAIN DESCRIPTION - PROGRESSION: CLINICAL_PROGRESSION: NOT CHANGED

## 2025-01-31 ASSESSMENT — PAIN DESCRIPTION - ORIENTATION
ORIENTATION: LEFT

## 2025-01-31 ASSESSMENT — PAIN DESCRIPTION - ONSET: ONSET: ONGOING

## 2025-01-31 ASSESSMENT — LIFESTYLE VARIABLES
EVER FELT BAD OR GUILTY ABOUT YOUR DRINKING: NO
HAVE PEOPLE ANNOYED YOU BY CRITICIZING YOUR DRINKING: NO
TOTAL SCORE: 0
HAVE YOU EVER FELT YOU SHOULD CUT DOWN ON YOUR DRINKING: NO
EVER HAD A DRINK FIRST THING IN THE MORNING TO STEADY YOUR NERVES TO GET RID OF A HANGOVER: NO

## 2025-01-31 ASSESSMENT — PAIN DESCRIPTION - PAIN TYPE: TYPE: ACUTE PAIN

## 2025-01-31 ASSESSMENT — PAIN DESCRIPTION - FREQUENCY: FREQUENCY: CONSTANT/CONTINUOUS

## 2025-01-31 NOTE — ED TRIAGE NOTES
Patient arrives via medic from home with c/o fall, left shoulder injury. Pt states she was coming down the steps, missed the last step, landed on her left shoulder. 8/10 pain. Pt given 100 mcg fentanyl and 4 mg zofran en rt. Pt was on the floor upon medics arrival. Denies striking her head. Denies previous injury to left shoulder. On low dose aspirin. AOx4    BP dropped to 50s systolic after iv fentanyl; given 500 ml bolus > 94systolic

## 2025-01-31 NOTE — ED PROVIDER NOTES
Chief Complaint   Patient presents with    Fall    Shoulder Injury     left       75-year-old female arrives to the emergency department chief complaint of mechanical fall.  The patient was walking down her steps at home, missed the last step, falling onto a hardwood floor landing on her left shoulder.  The patient does have chronic pains in bilateral shoulders with a full shoulder replacement in the right shoulder.  Patient endorses a 10 out of 10 despite having 100 mcg of fentanyl and route via EMS.  The patient is neurovascularly intact in the left upper extremity, difficult to assess for deformity given that the patient is adducted and refuses to move secondary to pain.  Patient denies striking her head, denies any loss of consciousness, patient states that she remembers the events prior to, during, after the fall.      History provided by:  Patient   used: No         PmHx, PsHx, Allergies, Family Hx, social Hx reviewed as documented    Given the focused nature of the complaint, only related components review of systems were evaluated, and abnormalities indicated in HPI    Physical Exam:    General: Patient is AAOx3, appears well developed, well nourished, is a good historian, answers questions appropriately    HEENT: head normocephalic, atraumatic, PERRLA, EOMs intact, oropharynx without erythema or exudate, buccal mucosa intact without lesions, TMs unremarkable, nose is patent bilateral    Pulmonary: CTAB, no accessory muscle use, able to speak full clear sentences    Cardiac: HRRR, no murmurs, rubs or gallops    GI: soft, non-tender, non-distended    Musculoskeletal: Left shoulder pain per HPI full weight bearing, HANSON, no joint effusions, clubbing or edema noted    Skin: intact, no lesions or rashes noted, turgor is good.    Medical Decision Making  This patient was seen, treated, and evaluated in conjunction with Dr. Adan Ramos    Primary consideration for this patient would be a  shoulder fracture, contusion, dislocation, subluxation secondary to a fall.  The patient did not strike her head, patient had no precipitating factors to the fall, the plan for the patient initially will be imaging of the left shoulder as well as pain control.  Patient initially given 50 mcg of fentanyl    The fentanyl was not effective for the patient's hip pain, patient given 1 mg of IV Dilaudid for further pain control.    The x-rays of the shoulder, humerus, elbow were negative for any acute abnormality or dislocation.  However the patient continues to complain of pain despite being obviously impaired by the narcotic medication, still in obvious pain.  A CT of the left shoulder will be used to further evaluate for an underlying occult fracture or dislocation    The CT scan shows an anterior dislocation of the left shoulder, it is reasonable to consider that this specific position seen with the plain films were not conducive to showing the fracture as well as the dislocation.    With the attending physician above, the patient as well as her  verbalized understanding of the risk and benefits of procedural sedation, and gave verbal consent to the electronic consent form.      Please see conscious sedation procedural note from the above attending physician, as well as procedural note for reduction of my own.  Immediately following the reduction, multiple times of the shoulder went into place and popped back out of place, with the last time the shoulder staying in place, immediately placed in a sling and a swath, indicating an unstable left shoulder.    Upon patient's wakening, the patient is pain-free.  The patient as well as her  verbalized understanding of expedited orthopedic follow-up given the instability of the left shoulder.  Patient given a limited prescription of oxycodone to be taken for any breakthrough pain.  The patient's reduction was confirmed with shoulder x-ray also showing improved  alignment of the fracture.    Patient is amenable to the plan of discharge as outlined above, all patient's questions pertaining to their ED course were answered in their entirety.  Strict return precautions were discussed with the patient and they verbalized understanding.  Further, it was made clear to the patient that from an emergent basis, all effort and testing was done to eliminate any imminent dangerous or potentially dangerous conditions of the patient however if their symptoms get much worse or feel life-threatening, they are to return to the emergency department or call 911 immediately.    Amount and/or Complexity of Data Reviewed  Radiology: ordered. Decision-making details documented in ED Course.     Orthopaedic Injury Treatment - Upper Extremity    Performed by: YRIS Austin-CNP  Authorized by: Adan Ramos,     Consent:     Consent obtained:  Verbal    Consent given by:  Patient    Risks, benefits, and alternatives were discussed: yes      Risks discussed:  Fracture, nerve damage, restricted joint movement, vascular damage, irreducible dislocation, recurrent dislocation and stiffness    Alternatives discussed:  No treatment, alternative treatment and immobilization  Universal protocol:     Procedure explained and questions answered to patient or proxy's satisfaction: yes      Relevant documents present and verified: yes      Test results available: yes      Imaging studies available: yes      Required blood products, implants, devices, and special equipment available: yes      Site/side marked: yes      Immediately prior to procedure, a time out was called: yes      Patient identity confirmed:  Verbally with patient, hospital-assigned identification number and arm band  Location:     Location:  Shoulder    Shoulder location:  L shoulder    Shoulder dislocation type: anterior      Chronicity:  New  Pre-procedure details:     Pre-procedure imaging:  CT and x-ray    Imaging findings:  dislocation present and fracture present      Distal perfusion: normal    Sedation:     Sedation type:  Moderate sedation  Anesthesia:     Anesthesia method:  None  Procedure details:     Manipulation performed: yes      Shoulder reduction method:  Direct traction and traction and counter traction    Reduction successful: yes      Reduction confirmed with imaging: yes      Immobilization:  Sling (Sling and swath)    Supplies used:  Sling  Post-procedure details:     Neurological function: normal      Distal perfusion: normal      Range of motion: improved      Procedure completion:  Tolerated well, no immediate complications     Diagnoses as of 01/31/25 2234   Shoulder dislocation, left, initial encounter       The patient has had the following imaging during this ER visit: XR SHOULDER LEFT 2+ VIEWS  XR HUMERUS LEFT  XR ELBOW LEFT 1-2 VIEWS  CT SHOULDER LEFT WO IV CONTRAST  XR SHOULDER LEFT 2+ VIEWS  GENERAL SUPPLY  NPO DIET  VITAL SIGNS  CARDIAC MONITORING - ED/PACU ONLY  NURSING COMMUNICATION  NURSING COMMUNICATION     Patient History   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    Hyperlipidemia     Hyperlipidemia     Hypertension     Hypertension     Neuropathy     Other abnormal glucose 08/08/2016    Other abnormal glucose    Other conditions influencing health status     No significant past medical history    Pain in right shoulder 02/02/2016    Right shoulder pain    Pain in right shoulder 02/02/2016    Right shoulder pain    Pathological fracture due to osteoporosis 01/10/2019    Peripheral neuropathy     Personal history of diseases of the skin and  subcutaneous tissue 08/08/2016    History of solar lentigo    Personal history of other diseases of the nervous system and sense organs 02/05/2016    History of polyneuropathy    Personal history of other specified conditions 08/08/2016    History of shortness of breath    Pneumonia     8/2023    Primary stabbing headache 08/08/2016    Primary stabbing headache     Past Surgical History:   Procedure Laterality Date    APPENDECTOMY      BREAST CYST EXCISION Left     CARDIAC CATHETERIZATION      HYSTERECTOMY  02/02/2016    Hysterectomy    LAPAROSCOPIC NISSEN FUNDOPLICATION      PACEMAKER PLACEMENT Left     TOTAL SHOULDER ARTHROPLASTY Right 11/2023    WRIST FRACTURE SURGERY Left     with hardware     Family History   Problem Relation Name Age of Onset    Heart disease Mother      Hypertension Mother      Other (cardiac disorder) Mother      Diabetes Mother      Lung cancer Father      Breast cancer Sister      Hypertension Sister      Diabetes Sister      Cancer Sister      Cancer Brother      Breast cancer Mother's Sister       Social History     Tobacco Use    Smoking status: Former     Current packs/day: 1.00     Average packs/day: 1 pack/day for 43.0 years (43.0 ttl pk-yrs)     Types: Cigarettes    Smokeless tobacco: Never    Tobacco comments:     Quit 25 yrs ago   Vaping Use    Vaping status: Never Used   Substance Use Topics    Alcohol use: Yes     Alcohol/week: 17.0 standard drinks of alcohol     Types: 7 Glasses of wine, 10 Standard drinks or equivalent per week     Comment: 1-2 weekly    Drug use: Never       ED Triage Vitals [01/31/25 1503]   Temperature Heart Rate Respirations BP   35.6 °C (96 °F) 66 15 91/66      Pulse Ox Temp Source Heart Rate Source Patient Position   94 % Temporal Monitor --      BP Location FiO2 (%)     -- --       Vitals:    01/31/25 2120 01/31/25 2122 01/31/25 2209 01/31/25 2221   BP: 146/84 (!) 152/99 (!) 149/93 157/82   BP Location:   Right arm Right arm   Patient Position:   Lying  Lying   Pulse: 77 77 81 82   Resp: 18 15 (!) 25 16   Temp:       TempSrc:       SpO2: 99% 99% 99% 96%   Weight:       Height:                   Jesus Gandhi, YRIS-CNP  01/31/25 3887

## 2025-02-01 NOTE — DISCHARGE INSTRUCTIONS
As discussed, it is important that you receive expedited orthopedic follow-up given the unstable nature of your left shoulder

## 2025-02-01 NOTE — ED PROCEDURE NOTE
Procedure  Moderate Sedation    Performed by: Adan Ramos DO  Authorized by: Adan Ramos DO    Consent:     Consent obtained:  Verbal    Consent given by:  Patient    Risks, benefits, and alternatives were discussed: yes      Risks discussed:  Allergic reaction, prolonged hypoxia resulting in organ damage, dysrhythmia, prolonged sedation necessitating reversal, inadequate sedation, respiratory compromise necessitating ventilatory assistance and intubation, nausea and vomiting  Universal protocol:     Protocol observed: The universal protocol was observed before the procedure and is documented in the nursing flowsheets    Indications:     Procedure performed:  Dislocation reduction    Procedure necessitating sedation performed by:  Physician performing sedation    Level of sedation:  Moderate  Pre-sedation assessment:     Mouth openin finger widths    Thyromental distance:  3 finger widths    Mallampati score:  II - soft palate, uvula, fauces visible    Neck mobility: normal      History of difficult intubation: no    Immediate pre-procedure details:     Monitoring: The patient is on appropriate monitoring (Including: 3 or 5 lead EKG, Pulse Oximetry, Capnography, and Blood Pressure monitoring), oxygenation has been addressed, and critical airway and emergency equipment is immediately available before the initiation of sedation      Reassessment: Patient reassessed immediately prior to procedure      Reviewed: vital signs and NPO status    Procedure details (see MAR for exact dosages):     Sedation start time:  2025 8:48 PM    Intra-procedure management:  Supplemental oxygen    Sedation end time:  2025 8:58 PM    Total sedation time (minutes):  10  Post-procedure details:     Attendance: Constant attendance by certified staff until patient recovered      Procedure completion:  Tolerated well, no immediate complications  Comments:      SEDATION PROVIDED BY ME.  KEYA BY KENNA TAN  BENOIT Ramos, DO  01/31/25 6475

## 2025-02-03 ENCOUNTER — TELEPHONE (OUTPATIENT)
Dept: ORTHOPEDIC SURGERY | Facility: CLINIC | Age: 75
End: 2025-02-03

## 2025-02-03 ENCOUNTER — APPOINTMENT (OUTPATIENT)
Dept: CARDIOLOGY | Facility: CLINIC | Age: 75
End: 2025-02-03
Payer: MEDICARE

## 2025-02-03 VITALS
WEIGHT: 184 LBS | SYSTOLIC BLOOD PRESSURE: 110 MMHG | HEIGHT: 69 IN | BODY MASS INDEX: 27.25 KG/M2 | DIASTOLIC BLOOD PRESSURE: 72 MMHG | HEART RATE: 74 BPM

## 2025-02-03 DIAGNOSIS — I10 BENIGN ESSENTIAL HYPERTENSION: ICD-10-CM

## 2025-02-03 DIAGNOSIS — Z95.0 CARDIAC PACEMAKER: ICD-10-CM

## 2025-02-03 PROCEDURE — 1160F RVW MEDS BY RX/DR IN RCRD: CPT | Performed by: INTERNAL MEDICINE

## 2025-02-03 PROCEDURE — 93005 ELECTROCARDIOGRAM TRACING: CPT | Performed by: INTERNAL MEDICINE

## 2025-02-03 PROCEDURE — 4010F ACE/ARB THERAPY RXD/TAKEN: CPT | Performed by: INTERNAL MEDICINE

## 2025-02-03 PROCEDURE — 3078F DIAST BP <80 MM HG: CPT | Performed by: INTERNAL MEDICINE

## 2025-02-03 PROCEDURE — 3074F SYST BP LT 130 MM HG: CPT | Performed by: INTERNAL MEDICINE

## 2025-02-03 PROCEDURE — 1036F TOBACCO NON-USER: CPT | Performed by: INTERNAL MEDICINE

## 2025-02-03 PROCEDURE — 1159F MED LIST DOCD IN RCRD: CPT | Performed by: INTERNAL MEDICINE

## 2025-02-03 PROCEDURE — 3008F BODY MASS INDEX DOCD: CPT | Performed by: INTERNAL MEDICINE

## 2025-02-03 PROCEDURE — 99213 OFFICE O/P EST LOW 20 MIN: CPT | Performed by: INTERNAL MEDICINE

## 2025-02-03 ASSESSMENT — ENCOUNTER SYMPTOMS
DEPRESSION: 0
OCCASIONAL FEELINGS OF UNSTEADINESS: 0
LOSS OF SENSATION IN FEET: 1

## 2025-02-03 NOTE — PATIENT INSTRUCTIONS
Continue all current medications as prescribed.   Please be sure to keep yourself well hydrated throughout the day.   If you require any surgery for your shoulder, there are no contraindications from a heart standpoint.  Followup with Dr. Valdes in 1 year, sooner should any issues or concerns arise before then.     If you have any questions or cardiac concerns, please call our office at [phone].

## 2025-02-03 NOTE — PROGRESS NOTES
"Counseling:  The patient was counseled regarding diagnostic results, instructions for management, risk factor reductions, prognosis, patient and family education, impressions, risks and benefits of treatment options and importance of compliance with treatment.      Chief Complaint:   The patient presents today for annual followup of CAD, HTN and syncope/presumed heart block.      History Of Present Illness:    Edda Webb is a 75 year old female patient who presents today for annual followup of CAD, HTN and syncope/presumed heart block. Her PMH is significant for situational syncope (presumed heart block) s/p pacemaker insertion approximately 21 years ago, GERD s/p Nissen fundoplication and subsequent revision, Mayers's esophagus, HTN, COPD, dysmetabolic syndrome, hyperlipidemia, and peripheral neuropathy. Over the past year, the patient states that she has done well from a cardiac standpoint. She denies any CP, chest discomfort or SOB. Her left arm is currently in a sling as she recently dislocated her shoulder, and is scheduled with ortho tomorrow, 02/04/2025. BP has been stable. The patient is compliant with her prescribed medications.     Last Recorded Vitals:  Vitals:    02/03/25 1001   BP: 110/72   BP Location: Right arm   Pulse: 74   Weight: 83.5 kg (184 lb)   Height: 1.753 m (5' 9\")       Past Surgical History:  She has a past surgical history that includes Hysterectomy (02/02/2016); pacemaker placement (Left); Laparoscopic Nissen fundoplication; Wrist fracture surgery (Left); Appendectomy; Breast cyst excision (Left); Cardiac catheterization; and Total shoulder arthroplasty (Right, 11/2023).      Social History:  She reports that she has quit smoking. Her smoking use included cigarettes. She has a 43 pack-year smoking history. She has never used smokeless tobacco. She reports current alcohol use of about 17.0 standard drinks of alcohol per week. She reports that she does not use drugs.    Family " History:  Family History   Problem Relation Name Age of Onset    Heart disease Mother      Hypertension Mother      Other (cardiac disorder) Mother      Diabetes Mother      Lung cancer Father      Breast cancer Sister      Hypertension Sister      Diabetes Sister      Cancer Sister      Cancer Brother      Breast cancer Mother's Sister          Allergies:  Codeine    Outpatient Medications:  Current Outpatient Medications   Medication Instructions    acetaminophen (TYLENOL 8 HOUR) 1,300 mg, As needed    albuterol (Proventil HFA) 90 mcg/actuation inhaler 2 puffs, Every 6 hours PRN    ammonium lactate (Lac-Hydrin) 12 % lotion As needed    aspirin 81 mg, Daily    cyclobenzaprine (FLEXERIL) 5 mg, oral, 3 times daily PRN    gabapentin (NEURONTIN) 600 mg, oral, 3 times daily    ibuprofen 600 mg, oral, Every 8 hours PRN    lisinopril 10 mg, oral, Daily    loratadine (CLARITIN) 10 mg, oral, Daily    metFORMIN XR (GLUCOPHAGE-XR) 500 mg, oral, Daily with evening meal    multivit-minerals/folic acid (CENTRUM ADULT 50 PLUS ORAL) 1 tablet, Daily    omega 3-dha-epa-fish oil (Fish OiL) 1,000 mg (120 mg-180 mg) capsule 1 capsule, Daily    oxyCODONE (ROXICODONE) 5 mg, oral, Every 6 hours PRN    pantoprazole (PROTONIX) 40 mg, oral, Daily before breakfast    PAPAYA ENZYME ORAL 2 tablets, 2 times daily    simvastatin (ZOCOR) 20 mg, oral, Nightly    triamcinolone (Kenalog) 0.1 % cream 1 Application, Topical, 2 times daily PRN, Apply to affected area     Review of Systems   Musculoskeletal:  Positive for joint pain.   All other systems reviewed and are negative.     Physical Exam:  Constitutional:       Appearance: Healthy appearance. Not in distress.   Neck:      Vascular: No JVR. JVD normal.   Pulmonary:      Effort: Pulmonary effort is normal.      Breath sounds: Normal breath sounds. No wheezing. No rhonchi. No rales.   Chest:      Chest wall: Not tender to palpatation.   Cardiovascular:      PMI at left midclavicular line. Normal  rate. Regular rhythm. Normal S1. Normal S2.       Murmurs: There is no murmur.      No gallop.  No click. No rub.   Pulses:     Intact distal pulses.   Edema:     Peripheral edema absent.   Abdominal:      General: Bowel sounds are normal.      Palpations: Abdomen is soft.      Tenderness: There is no abdominal tenderness.   Musculoskeletal: Normal range of motion.         General: No tenderness. Skin:     General: Skin is warm and dry.   Neurological:      General: No focal deficit present.      Mental Status: Alert and oriented to person, place and time.          Last Labs:  CBC -  Lab Results   Component Value Date    WBC 5.6 03/25/2024    HGB 13.0 03/25/2024    HCT 41.2 03/25/2024    MCV 92 03/25/2024     03/25/2024       CMP -  Lab Results   Component Value Date    CALCIUM 9.5 03/25/2024    PROT 6.9 03/25/2024    ALBUMIN 4.2 03/25/2024    AST 15 03/25/2024    ALT 18 03/25/2024    ALKPHOS 59 03/25/2024    BILITOT 0.4 03/25/2024       LIPID PANEL -   Lab Results   Component Value Date    CHOL 152 03/25/2024    TRIG 126 03/25/2024    HDL 62.0 03/25/2024    CHHDL 2.5 03/25/2024    LDLF 69 12/05/2022    VLDL 25 03/25/2024    NHDL 90 03/25/2024       RENAL FUNCTION PANEL -   Lab Results   Component Value Date    GLUCOSE 95 03/25/2024     03/25/2024    K 4.5 03/25/2024     03/25/2024    CO2 31 03/25/2024    ANIONGAP 10 03/25/2024    BUN 18 03/25/2024    CREATININE 0.91 03/25/2024    CALCIUM 9.5 03/25/2024    ALBUMIN 4.2 03/25/2024        Lab Results   Component Value Date    HGBA1C 5.9 12/30/2024       Last Cardiology Tests:  06/22/2023 - Cardiac Catheterization (LH)  1. Non-obstructive coronary artery disease.  2. LVEDP Normal of 13mmHg.    06/05/2023 - Exercise Stress Echo  1. No ECG changes from baseline.  2. Adequate level of stress achieved.  3. At peak, there are stress-induced regional wall motion abnormalities.  4. Stage 2: Impost: Mid and apical anterior septum and mid and apical inferior  septum are abnormal.  5. Abnormal with stress induced wall motion abnormalities seen in the septal wall on peak stress imaging concerning for ischemia.     05/19/2023 - TTE  Left ventricular systolic function is normal with a 65-70% estimated ejection fraction.     Lab review: I have personally reviewed the laboratory result(s).    Assessment/Plan   1) CAD  On ASA 81 mg daily, lisinopril 10 mg daily, simvastatin 20 mg daily   TTE 05/19/2023 with LVEF 65-70%   Stress testing 06/05/2023 abnormal with stress-induced regional wall motion abnormalities concerning for ischemia   [facility] 06/22/2023 with non-obstructive coronary artery disease  Lipid panel 03/25/2024 with total cholesterol, LDL and triglycerides of 152, 65 and 126 respectively  Denies CP, chest discomfort or SOB  BP stable  Continue current medical Rx   F/U 1 year      2) Syncope (Presumed Heart Block) s/p PPM  [facility]  EKG not performed today as patient's left arm is currently in a sling d/t dislocated shoulder  F/U 1 year      3) Hypertension  Stable  On lisinopril 10 mg daily   Continue current medical Rx   F/U 1 year         Scribe Attestation  By signing my name below, I, Robin Jeong   attest that this documentation has been prepared under the direction and in the presence of Bandar Valdes MD.

## 2025-02-03 NOTE — LETTER
"February 3, 2025     Lui Lock DO  [address]  [address]  [facility] [address]    Patient: Edda Webb   YOB: 1950   Date of Visit: 2/3/2025       Dear Dr. uLi Lock DO:    Thank you for referring Edda Webb to me for evaluation. Below are my notes for this consultation.  If you have questions, please do not hesitate to call me. I look forward to following your patient along with you.       Sincerely,     Bandar Valdes MD      CC: No Recipients  ______________________________________________________________________________________    Counseling:  The patient was counseled regarding diagnostic results, instructions for management, risk factor reductions, prognosis, patient and family education, impressions, risks and benefits of treatment options and importance of compliance with treatment.      Chief Complaint:   The patient presents today for annual followup of CAD, HTN and syncope/presumed heart block.      History Of Present Illness:    Edda Webb is a 75 year old female patient who presents today for annual followup of CAD, HTN and syncope/presumed heart block. Her PMH is significant for situational syncope (presumed heart block) s/p pacemaker insertion approximately 21 years ago, GERD s/p Nissen fundoplication and subsequent revision, Mayers's esophagus, HTN, COPD, dysmetabolic syndrome, hyperlipidemia, and peripheral neuropathy. Over the past year, the patient states that she has done well from a cardiac standpoint. She denies any CP, chest discomfort or SOB. Her left arm is currently in a sling as she recently dislocated her shoulder, and is scheduled with ortho tomorrow, 02/04/2025. BP has been stable. The patient is compliant with her prescribed medications.     Last Recorded Vitals:  Vitals:    02/03/25 1001   BP: 110/72   BP Location: Right arm   Pulse: 74   Weight: 83.5 kg (184 lb)   Height: 1.753 m (5' 9\")       Past Surgical History:  She has a past surgical " history that includes Hysterectomy (02/02/2016); pacemaker placement (Left); Laparoscopic Nissen fundoplication; Wrist fracture surgery (Left); Appendectomy; Breast cyst excision (Left); Cardiac catheterization; and Total shoulder arthroplasty (Right, 11/2023).      Social History:  She reports that she has quit smoking. Her smoking use included cigarettes. She has a 43 pack-year smoking history. She has never used smokeless tobacco. She reports current alcohol use of about 17.0 standard drinks of alcohol per week. She reports that she does not use drugs.    Family History:  Family History   Problem Relation Name Age of Onset   • Heart disease Mother     • Hypertension Mother     • Other (cardiac disorder) Mother     • Diabetes Mother     • Lung cancer Father     • Breast cancer Sister     • Hypertension Sister     • Diabetes Sister     • Cancer Sister     • Cancer Brother     • Breast cancer Mother's Sister          Allergies:  Codeine    Outpatient Medications:  Current Outpatient Medications   Medication Instructions   • acetaminophen (TYLENOL 8 HOUR) 1,300 mg, As needed   • albuterol (Proventil HFA) 90 mcg/actuation inhaler 2 puffs, Every 6 hours PRN   • ammonium lactate (Lac-Hydrin) 12 % lotion As needed   • aspirin 81 mg, Daily   • cyclobenzaprine (FLEXERIL) 5 mg, oral, 3 times daily PRN   • gabapentin (NEURONTIN) 600 mg, oral, 3 times daily   • ibuprofen 600 mg, oral, Every 8 hours PRN   • lisinopril 10 mg, oral, Daily   • loratadine (CLARITIN) 10 mg, oral, Daily   • metFORMIN XR (GLUCOPHAGE-XR) 500 mg, oral, Daily with evening meal   • multivit-minerals/folic acid (CENTRUM ADULT 50 PLUS ORAL) 1 tablet, Daily   • omega 3-dha-epa-fish oil (Fish OiL) 1,000 mg (120 mg-180 mg) capsule 1 capsule, Daily   • oxyCODONE (ROXICODONE) 5 mg, oral, Every 6 hours PRN   • pantoprazole (PROTONIX) 40 mg, oral, Daily before breakfast   • PAPAYA ENZYME ORAL 2 tablets, 2 times daily   • simvastatin (ZOCOR) 20 mg, oral, Nightly    • triamcinolone (Kenalog) 0.1 % cream 1 Application, Topical, 2 times daily PRN, Apply to affected area     Review of Systems   Musculoskeletal:  Positive for joint pain.   All other systems reviewed and are negative.     Physical Exam:  Constitutional:       Appearance: Healthy appearance. Not in distress.   Neck:      Vascular: No JVR. JVD normal.   Pulmonary:      Effort: Pulmonary effort is normal.      Breath sounds: Normal breath sounds. No wheezing. No rhonchi. No rales.   Chest:      Chest wall: Not tender to palpatation.   Cardiovascular:      PMI at left midclavicular line. Normal rate. Regular rhythm. Normal S1. Normal S2.       Murmurs: There is no murmur.      No gallop.  No click. No rub.   Pulses:     Intact distal pulses.   Edema:     Peripheral edema absent.   Abdominal:      General: Bowel sounds are normal.      Palpations: Abdomen is soft.      Tenderness: There is no abdominal tenderness.   Musculoskeletal: Normal range of motion.         General: No tenderness. Skin:     General: Skin is warm and dry.   Neurological:      General: No focal deficit present.      Mental Status: Alert and oriented to person, place and time.          Last Labs:  CBC -  Lab Results   Component Value Date    WBC 5.6 03/25/2024    HGB 13.0 03/25/2024    HCT 41.2 03/25/2024    MCV 92 03/25/2024     03/25/2024       CMP -  Lab Results   Component Value Date    CALCIUM 9.5 03/25/2024    PROT 6.9 03/25/2024    ALBUMIN 4.2 03/25/2024    AST 15 03/25/2024    ALT 18 03/25/2024    ALKPHOS 59 03/25/2024    BILITOT 0.4 03/25/2024       LIPID PANEL -   Lab Results   Component Value Date    CHOL 152 03/25/2024    TRIG 126 03/25/2024    HDL 62.0 03/25/2024    CHHDL 2.5 03/25/2024    LDLF 69 12/05/2022    VLDL 25 03/25/2024    NHDL 90 03/25/2024       RENAL FUNCTION PANEL -   Lab Results   Component Value Date    GLUCOSE 95 03/25/2024     03/25/2024    K 4.5 03/25/2024     03/25/2024    CO2 31 03/25/2024     ANIONGAP 10 03/25/2024    BUN 18 03/25/2024    CREATININE 0.91 03/25/2024    CALCIUM 9.5 03/25/2024    ALBUMIN 4.2 03/25/2024        Lab Results   Component Value Date    HGBA1C 5.9 12/30/2024       Last Cardiology Tests:  06/22/2023 - Cardiac Catheterization (LH)  1. Non-obstructive coronary artery disease.  2. LVEDP Normal of 13mmHg.    06/05/2023 - Exercise Stress Echo  1. No ECG changes from baseline.  2. Adequate level of stress achieved.  3. At peak, there are stress-induced regional wall motion abnormalities.  4. Stage 2: Impost: Mid and apical anterior septum and mid and apical inferior septum are abnormal.  5. Abnormal with stress induced wall motion abnormalities seen in the septal wall on peak stress imaging concerning for ischemia.     05/19/2023 - TTE  Left ventricular systolic function is normal with a 65-70% estimated ejection fraction.     Lab review: I have personally reviewed the laboratory result(s).    Assessment/Plan  1) CAD  On ASA 81 mg daily, lisinopril 10 mg daily, simvastatin 20 mg daily   TTE 05/19/2023 with LVEF 65-70%   Stress testing 06/05/2023 abnormal with stress-induced regional wall motion abnormalities concerning for ischemia   [facility] 06/22/2023 with non-obstructive coronary artery disease  Lipid panel 03/25/2024 with total cholesterol, LDL and triglycerides of 152, 65 and 126 respectively  Denies CP, chest discomfort or SOB  BP stable  Continue current medical Rx   F/U 1 year      2) Syncope (Presumed Heart Block) s/p PPM  Followed by [facility]  EKG not performed today as patient's left arm is currently in a sling d/t dislocated shoulder  F/U 1 year      3) Hypertension  Stable  On lisinopril 10 mg daily   Continue current medical Rx   F/U 1 year         Scribe Attestation  By signing my name below, I, Robin Jeong   attest that this documentation has been prepared under the direction and in the presence of Bandar Valdes MD.

## 2025-02-03 NOTE — TELEPHONE ENCOUNTER
Patient was a seen in the ER on Friday night she fell and hurt her shoulder that we have been treating her for. She had a x ray dine but didn't not show much she said so they did a CT scan. Can you please let me know how soon she should be seen

## 2025-02-03 NOTE — H&P (VIEW-ONLY)
"Counseling:  The patient was counseled regarding diagnostic results, instructions for management, risk factor reductions, prognosis, patient and family education, impressions, risks and benefits of treatment options and importance of compliance with treatment.      Chief Complaint:   The patient presents today for annual followup of CAD, HTN and syncope/presumed heart block.      History Of Present Illness:    Edda Webb is a 75 year old female patient who presents today for annual followup of CAD, HTN and syncope/presumed heart block. Her PMH is significant for situational syncope (presumed heart block) s/p pacemaker insertion approximately 21 years ago, GERD s/p Nissen fundoplication and subsequent revision, Mayers's esophagus, HTN, COPD, dysmetabolic syndrome, hyperlipidemia, and peripheral neuropathy. Over the past year, the patient states that she has done well from a cardiac standpoint. She denies any CP, chest discomfort or SOB. Her left arm is currently in a sling as she recently dislocated her shoulder, and is scheduled with ortho tomorrow, 02/04/2025. BP has been stable. The patient is compliant with her prescribed medications.     Last Recorded Vitals:  Vitals:    02/03/25 1001   BP: 110/72   BP Location: Right arm   Pulse: 74   Weight: 83.5 kg (184 lb)   Height: 1.753 m (5' 9\")       Past Surgical History:  She has a past surgical history that includes Hysterectomy (02/02/2016); pacemaker placement (Left); Laparoscopic Nissen fundoplication; Wrist fracture surgery (Left); Appendectomy; Breast cyst excision (Left); Cardiac catheterization; and Total shoulder arthroplasty (Right, 11/2023).      Social History:  She reports that she has quit smoking. Her smoking use included cigarettes. She has a 43 pack-year smoking history. She has never used smokeless tobacco. She reports current alcohol use of about 17.0 standard drinks of alcohol per week. She reports that she does not use drugs.    Family " History:  Family History   Problem Relation Name Age of Onset    Heart disease Mother      Hypertension Mother      Other (cardiac disorder) Mother      Diabetes Mother      Lung cancer Father      Breast cancer Sister      Hypertension Sister      Diabetes Sister      Cancer Sister      Cancer Brother      Breast cancer Mother's Sister          Allergies:  Codeine    Outpatient Medications:  Current Outpatient Medications   Medication Instructions    acetaminophen (TYLENOL 8 HOUR) 1,300 mg, As needed    albuterol (Proventil HFA) 90 mcg/actuation inhaler 2 puffs, Every 6 hours PRN    ammonium lactate (Lac-Hydrin) 12 % lotion As needed    aspirin 81 mg, Daily    cyclobenzaprine (FLEXERIL) 5 mg, oral, 3 times daily PRN    gabapentin (NEURONTIN) 600 mg, oral, 3 times daily    ibuprofen 600 mg, oral, Every 8 hours PRN    lisinopril 10 mg, oral, Daily    loratadine (CLARITIN) 10 mg, oral, Daily    metFORMIN XR (GLUCOPHAGE-XR) 500 mg, oral, Daily with evening meal    multivit-minerals/folic acid (CENTRUM ADULT 50 PLUS ORAL) 1 tablet, Daily    omega 3-dha-epa-fish oil (Fish OiL) 1,000 mg (120 mg-180 mg) capsule 1 capsule, Daily    oxyCODONE (ROXICODONE) 5 mg, oral, Every 6 hours PRN    pantoprazole (PROTONIX) 40 mg, oral, Daily before breakfast    PAPAYA ENZYME ORAL 2 tablets, 2 times daily    simvastatin (ZOCOR) 20 mg, oral, Nightly    triamcinolone (Kenalog) 0.1 % cream 1 Application, Topical, 2 times daily PRN, Apply to affected area     Review of Systems   Musculoskeletal:  Positive for joint pain.   All other systems reviewed and are negative.     Physical Exam:  Constitutional:       Appearance: Healthy appearance. Not in distress.   Neck:      Vascular: No JVR. JVD normal.   Pulmonary:      Effort: Pulmonary effort is normal.      Breath sounds: Normal breath sounds. No wheezing. No rhonchi. No rales.   Chest:      Chest wall: Not tender to palpatation.   Cardiovascular:      PMI at left midclavicular line. Normal  rate. Regular rhythm. Normal S1. Normal S2.       Murmurs: There is no murmur.      No gallop.  No click. No rub.   Pulses:     Intact distal pulses.   Edema:     Peripheral edema absent.   Abdominal:      General: Bowel sounds are normal.      Palpations: Abdomen is soft.      Tenderness: There is no abdominal tenderness.   Musculoskeletal: Normal range of motion.         General: No tenderness. Skin:     General: Skin is warm and dry.   Neurological:      General: No focal deficit present.      Mental Status: Alert and oriented to person, place and time.          Last Labs:  CBC -  Lab Results   Component Value Date    WBC 5.6 03/25/2024    HGB 13.0 03/25/2024    HCT 41.2 03/25/2024    MCV 92 03/25/2024     03/25/2024       CMP -  Lab Results   Component Value Date    CALCIUM 9.5 03/25/2024    PROT 6.9 03/25/2024    ALBUMIN 4.2 03/25/2024    AST 15 03/25/2024    ALT 18 03/25/2024    ALKPHOS 59 03/25/2024    BILITOT 0.4 03/25/2024       LIPID PANEL -   Lab Results   Component Value Date    CHOL 152 03/25/2024    TRIG 126 03/25/2024    HDL 62.0 03/25/2024    CHHDL 2.5 03/25/2024    LDLF 69 12/05/2022    VLDL 25 03/25/2024    NHDL 90 03/25/2024       RENAL FUNCTION PANEL -   Lab Results   Component Value Date    GLUCOSE 95 03/25/2024     03/25/2024    K 4.5 03/25/2024     03/25/2024    CO2 31 03/25/2024    ANIONGAP 10 03/25/2024    BUN 18 03/25/2024    CREATININE 0.91 03/25/2024    CALCIUM 9.5 03/25/2024    ALBUMIN 4.2 03/25/2024        Lab Results   Component Value Date    HGBA1C 5.9 12/30/2024       Last Cardiology Tests:  06/22/2023 - Cardiac Catheterization (LH)  1. Non-obstructive coronary artery disease.  2. LVEDP Normal of 13mmHg.    06/05/2023 - Exercise Stress Echo  1. No ECG changes from baseline.  2. Adequate level of stress achieved.  3. At peak, there are stress-induced regional wall motion abnormalities.  4. Stage 2: Impost: Mid and apical anterior septum and mid and apical inferior  septum are abnormal.  5. Abnormal with stress induced wall motion abnormalities seen in the septal wall on peak stress imaging concerning for ischemia.     05/19/2023 - TTE  Left ventricular systolic function is normal with a 65-70% estimated ejection fraction.     Lab review: I have personally reviewed the laboratory result(s).    Assessment/Plan   1) CAD  On ASA 81 mg daily, lisinopril 10 mg daily, simvastatin 20 mg daily   TTE 05/19/2023 with LVEF 65-70%   Stress testing 06/05/2023 abnormal with stress-induced regional wall motion abnormalities concerning for ischemia   [facility] 06/22/2023 with non-obstructive coronary artery disease  Lipid panel 03/25/2024 with total cholesterol, LDL and triglycerides of 152, 65 and 126 respectively  Denies CP, chest discomfort or SOB  BP stable  Continue current medical Rx   F/U 1 year      2) Syncope (Presumed Heart Block) s/p PPM  Followed by [facility]  EKG not performed today as patient's left arm is currently in a sling d/t dislocated shoulder  F/U 1 year      3) Hypertension  Stable  On lisinopril 10 mg daily   Continue current medical Rx   F/U 1 year         Scribe Attestation  By signing my name below, I, Robin Jeong   attest that this documentation has been prepared under the direction and in the presence of Bandra Valdes MD.

## 2025-02-04 ENCOUNTER — OFFICE VISIT (OUTPATIENT)
Dept: ORTHOPEDIC SURGERY | Facility: CLINIC | Age: 75
End: 2025-02-04
Payer: MEDICARE

## 2025-02-04 DIAGNOSIS — S42.142A DISPLACED FRACTURE OF GLENOID CAVITY OF SCAPULA, LEFT SHOULDER, INITIAL ENCOUNTER FOR CLOSED FRACTURE: Primary | ICD-10-CM

## 2025-02-04 PROCEDURE — 1160F RVW MEDS BY RX/DR IN RCRD: CPT | Performed by: ORTHOPAEDIC SURGERY

## 2025-02-04 PROCEDURE — 1036F TOBACCO NON-USER: CPT | Performed by: ORTHOPAEDIC SURGERY

## 2025-02-04 PROCEDURE — 1159F MED LIST DOCD IN RCRD: CPT | Performed by: ORTHOPAEDIC SURGERY

## 2025-02-04 PROCEDURE — 99213 OFFICE O/P EST LOW 20 MIN: CPT | Mod: 57 | Performed by: ORTHOPAEDIC SURGERY

## 2025-02-04 PROCEDURE — 99213 OFFICE O/P EST LOW 20 MIN: CPT | Performed by: ORTHOPAEDIC SURGERY

## 2025-02-04 PROCEDURE — L3670 SO ACRO/CLAV CAN WEB PRE OTS: HCPCS | Performed by: ORTHOPAEDIC SURGERY

## 2025-02-04 PROCEDURE — 4010F ACE/ARB THERAPY RXD/TAKEN: CPT | Performed by: ORTHOPAEDIC SURGERY

## 2025-02-04 RX ORDER — CEFAZOLIN SODIUM 2 G/100ML
2 INJECTION, SOLUTION INTRAVENOUS ONCE
Status: CANCELLED | OUTPATIENT
Start: 2025-02-04 | End: 2025-02-04

## 2025-02-04 ASSESSMENT — ENCOUNTER SYMPTOMS
ARTHRALGIAS: 1
WHEEZING: 0
WOUND: 0
CHILLS: 0
JOINT SWELLING: 1
SINUS PRESSURE: 0
SHORTNESS OF BREATH: 0
FEVER: 0
TROUBLE SWALLOWING: 0
EYE DISCHARGE: 0

## 2025-02-04 ASSESSMENT — PAIN SCALES - GENERAL: PAINLEVEL_OUTOF10: 5 - MODERATE PAIN

## 2025-02-04 ASSESSMENT — PAIN - FUNCTIONAL ASSESSMENT: PAIN_FUNCTIONAL_ASSESSMENT: 0-10

## 2025-02-04 NOTE — PROGRESS NOTES
Reason for Appointment  Chief Complaint   Patient presents with    Left Shoulder - Pain     History of Present Illness  Patient is a 75 y.o. female here today for a new problem of left shoulder pain.  She had a fall at home on Friday onto her left shoulder.  X-rays and CT scan taken at the emergency room are reviewed and show an anterior dislocation with a large glenoid fracture and there does appear to be some subluxation of the shoulder.  She does have a previous right reverse shoulder replacement and this shoulder is not bothering her today.  The emergency room did have difficulty reducing the shoulder and it appears today she still has some subluxation of the shoulder.  No numbness or tingling in the hand or arm.  No other changes in her past medical history, allergies, or medications.    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    Hyperlipidemia     Hyperlipidemia     Hypertension     Hypertension     Neuropathy     Other abnormal glucose 08/08/2016    Other abnormal glucose    Other conditions influencing health status     No significant past medical history    Pain in right shoulder 02/02/2016    Right shoulder pain    Pain in right shoulder 02/02/2016    Right shoulder pain    Pathological fracture due to osteoporosis 01/10/2019    Peripheral neuropathy     Personal history of diseases of the skin and subcutaneous tissue 08/08/2016    History of solar lentigo    Personal history of other diseases of the nervous system and sense organs 02/05/2016    History of polyneuropathy    Personal history of other specified conditions 08/08/2016    History of  shortness of breath    Pneumonia     2023    Primary stabbing headache 2016    Primary stabbing headache       Past Surgical History:   Procedure Laterality Date    APPENDECTOMY      BREAST CYST EXCISION Left     CARDIAC CATHETERIZATION      HYSTERECTOMY  2016    Hysterectomy    LAPAROSCOPIC NISSEN FUNDOPLICATION      PACEMAKER PLACEMENT Left     TOTAL SHOULDER ARTHROPLASTY Right 2023    WRIST FRACTURE SURGERY Left     with hardware       Medication Documentation Review Audit       Reviewed by Kim Alvarado MA (Medical Assistant) on 25 at 1024      Medication Order Taking? Sig Documenting Provider Last Dose Status   acetaminophen (Tylenol 8 HOUR) 650 mg ER tablet [id number] Yes Take 2 tablets (1,300 mg) by mouth if needed. Do not crush, chew, or split. Historical Provider, MD  Active   albuterol (Proventil HFA) 90 mcg/actuation inhaler [id number] Yes 2 puffs every 6 hours if needed for shortness of breath. Historical Provider, MD  Active   ammonium lactate (Lac-Hydrin) 12 % lotion [id number] Yes Apply topically if needed. Historical Provider, MD  Active   aspirin 81 mg EC tablet [id number] Yes Take 1 tablet (81 mg) by mouth once daily. Historical Provider, MD  Active   cyclobenzaprine (Flexeril) 5 mg tablet [id number]  Take 1 tablet (5 mg) by mouth 3 times a day as needed for muscle spasms for up to 5 days. Lydia Zendejas MD   25 2359   gabapentin (Neurontin) 600 mg tablet [id number] Yes Take 1 tablet (600 mg) by mouth 3 times a day. Lui Lock, DO  Active   ibuprofen 600 mg tablet [id number]  Take 1 tablet (600 mg) by mouth every 8 hours if needed for mild pain (1 - 3) for up to 5 days. Lydia Zendejas MD   25 2359   lisinopril 10 mg tablet [id number] Yes Take 1 tablet (10 mg) by mouth once daily. Lui Lock, DO  Active   loratadine (Claritin) 10 mg tablet [id number]  Take 1 tablet (10 mg) by mouth once daily. Terri Chino, APRN-CNP    25 2359   metFORMIN  mg 24 hr tablet [id number] Yes Take 1 tablet (500 mg) by mouth once daily in the evening. Take with meals. Lui Lock DO  Active   multivit-minerals/folic acid (CENTRUM ADULT 50 PLUS ORAL) [id number] Yes Take 1 tablet by mouth once daily. Historical Provider, MD  Active   omega 3-dha-epa-fish oil (Fish OiL) 1,000 mg (120 mg-180 mg) capsule [id number] Yes Take 1 capsule (1,000 mg) by mouth once daily. Historical Provider, MD  Active   oxyCODONE (Roxicodone) 5 mg immediate release tablet [id number] Yes Take 1 tablet (5 mg) by mouth every 6 hours if needed for severe pain (7 - 10). Jesus Gandhi, APRN-CNP  Active   pantoprazole (ProtoNix) 40 mg EC tablet [id number] Yes Take 1 tablet (40 mg) by mouth once daily in the morning. Take before meals. Lui Lock DO  Active   PAPAYA ENZYME ORAL [id number] Yes Take 2 tablets by mouth 2 times a day. Historical Provider, MD  Active   predniSONE (Deltasone) 10 mg tablet [id number]  Take 4 tablets (40 mg) by mouth once daily for 3 days, THEN 2 tablets (20 mg) once daily for 3 days, THEN 1 tablet (10 mg) once daily for 3 days. Lui Lock DO   25 2359   simvastatin (Zocor) 20 mg tablet [id number] Yes Take 1 tablet (20 mg) by mouth once daily at bedtime. Lui Lock DO  Active   triamcinolone (Kenalog) 0.1 % cream [id number] Yes Apply 1 Application topically 2 times a day as needed for irritation. Apply to affected area Lui Lock DO  Active                    Allergies   Allergen Reactions    Codeine Hives and Rash       Review of Systems   Constitutional:  Negative for chills and fever.   HENT:  Negative for nosebleeds, sinus pressure and trouble swallowing.    Eyes:  Negative for discharge.   Respiratory:  Negative for shortness of breath and wheezing.    Cardiovascular:  Negative for chest pain.   Musculoskeletal:  Positive for arthralgias and joint swelling.   Skin:  Negative for rash and wound.   All other  systems reviewed and are negative.    Exam   On exam left shoulder shows some deformity and some anterior subluxation, no neurovascular deficits, good wrist and hand motion.  Axillary nerve sensation is intact.  Good pulses and sensation, able to straighten the elbow.    Assessment   Encounter Diagnosis   Name Primary?    Displaced fracture of glenoid cavity of scapula, left shoulder, initial encounter for closed fracture Yes       Plan   With the large glenoid fracture this is a grossly unstable shoulder and she appears to have some anterior subluxation of the shoulder today on exam.  We discussed quick operative treatment.  She understands the risks of surgery including nerve, artery, tendon damage, infection, continued pain, need for future surgery and the lengthy recovery time.  We did discuss possible internal fixation of the glenoid along with a reverse shoulder replacement giving her stability.  We will proceed with surgery tomorrow, she can keep the arm at her side and allow the elbow to straighten.    Debi FORDE PA-C, am acting as a scribe and attest that this documentation has been prepared under the direction and in the presence of Rome Bear MD.    By signing below, IRome MD, personally performed the services described in this documentation. All medical record entries made by the scribe were at my direction and in my presence. I have reviewed the chart and agree that the record reflects my personal performance and is accurate and complete.

## 2025-02-05 ENCOUNTER — ANESTHESIA EVENT (OUTPATIENT)
Dept: OPERATING ROOM | Facility: HOSPITAL | Age: 75
End: 2025-02-05
Payer: MEDICARE

## 2025-02-05 ENCOUNTER — CLINICAL SUPPORT (OUTPATIENT)
Dept: PREADMISSION TESTING | Facility: HOSPITAL | Age: 75
End: 2025-02-05
Payer: MEDICARE

## 2025-02-05 ENCOUNTER — ANESTHESIA (OUTPATIENT)
Dept: OPERATING ROOM | Facility: HOSPITAL | Age: 75
End: 2025-02-05
Payer: MEDICARE

## 2025-02-05 ENCOUNTER — HOSPITAL ENCOUNTER (OUTPATIENT)
Facility: HOSPITAL | Age: 75
Discharge: HOME | End: 2025-02-06
Attending: ORTHOPAEDIC SURGERY | Admitting: ORTHOPAEDIC SURGERY
Payer: MEDICARE

## 2025-02-05 ENCOUNTER — APPOINTMENT (OUTPATIENT)
Dept: RADIOLOGY | Facility: HOSPITAL | Age: 75
End: 2025-02-05
Payer: MEDICARE

## 2025-02-05 DIAGNOSIS — S42.142A DISPLACED FRACTURE OF GLENOID CAVITY OF SCAPULA, LEFT SHOULDER, INITIAL ENCOUNTER FOR CLOSED FRACTURE: Primary | ICD-10-CM

## 2025-02-05 DIAGNOSIS — S42.142A DISPLACED FRACTURE OF GLENOID CAVITY OF SCAPULA, LEFT SHOULDER, INITIAL ENCOUNTER FOR CLOSED FRACTURE: ICD-10-CM

## 2025-02-05 PROBLEM — S42.152A GLENOID FRACTURE OF SHOULDER, LEFT, CLOSED, INITIAL ENCOUNTER: Status: ACTIVE | Noted: 2025-02-05

## 2025-02-05 LAB
GLUCOSE BLD MANUAL STRIP-MCNC: 111 MG/DL (ref 74–99)
GLUCOSE BLD MANUAL STRIP-MCNC: 119 MG/DL (ref 74–99)
GLUCOSE BLD MANUAL STRIP-MCNC: 185 MG/DL (ref 74–99)

## 2025-02-05 PROCEDURE — 2500000001 HC RX 250 WO HCPCS SELF ADMINISTERED DRUGS (ALT 637 FOR MEDICARE OP)

## 2025-02-05 PROCEDURE — 2500000004 HC RX 250 GENERAL PHARMACY W/ HCPCS (ALT 636 FOR OP/ED): Performed by: PHYSICIAN ASSISTANT

## 2025-02-05 PROCEDURE — 23472 RECONSTRUCT SHOULDER JOINT: CPT | Performed by: ORTHOPAEDIC SURGERY

## 2025-02-05 PROCEDURE — 2500000004 HC RX 250 GENERAL PHARMACY W/ HCPCS (ALT 636 FOR OP/ED): Performed by: STUDENT IN AN ORGANIZED HEALTH CARE EDUCATION/TRAINING PROGRAM

## 2025-02-05 PROCEDURE — 82947 ASSAY GLUCOSE BLOOD QUANT: CPT

## 2025-02-05 PROCEDURE — 73020 X-RAY EXAM OF SHOULDER: CPT | Mod: LT

## 2025-02-05 PROCEDURE — 7100000011 HC EXTENDED STAY RECOVERY HOURLY - NURSING UNIT

## 2025-02-05 PROCEDURE — 7100000001 HC RECOVERY ROOM TIME - INITIAL BASE CHARGE: Performed by: ORTHOPAEDIC SURGERY

## 2025-02-05 PROCEDURE — 3600000005 HC OR TIME - INITIAL BASE CHARGE - PROCEDURE LEVEL FIVE: Performed by: ORTHOPAEDIC SURGERY

## 2025-02-05 PROCEDURE — 2780000003 HC OR 278 NO HCPCS: Performed by: ORTHOPAEDIC SURGERY

## 2025-02-05 PROCEDURE — 99222 1ST HOSP IP/OBS MODERATE 55: CPT | Performed by: EMERGENCY MEDICINE

## 2025-02-05 PROCEDURE — C1713 ANCHOR/SCREW BN/BN,TIS/BN: HCPCS | Performed by: ORTHOPAEDIC SURGERY

## 2025-02-05 PROCEDURE — 2500000004 HC RX 250 GENERAL PHARMACY W/ HCPCS (ALT 636 FOR OP/ED)

## 2025-02-05 PROCEDURE — 3700000002 HC GENERAL ANESTHESIA TIME - EACH INCREMENTAL 1 MINUTE: Performed by: ORTHOPAEDIC SURGERY

## 2025-02-05 PROCEDURE — 73020 X-RAY EXAM OF SHOULDER: CPT | Mod: LEFT SIDE | Performed by: STUDENT IN AN ORGANIZED HEALTH CARE EDUCATION/TRAINING PROGRAM

## 2025-02-05 PROCEDURE — 23430 REPAIR BICEPS TENDON: CPT | Performed by: PHYSICIAN ASSISTANT

## 2025-02-05 PROCEDURE — A23472 PR RECONSTR TOTAL SHOULDER IMPLANT: Performed by: STUDENT IN AN ORGANIZED HEALTH CARE EDUCATION/TRAINING PROGRAM

## 2025-02-05 PROCEDURE — 3600000010 HC OR TIME - EACH INCREMENTAL 1 MINUTE - PROCEDURE LEVEL FIVE: Performed by: ORTHOPAEDIC SURGERY

## 2025-02-05 PROCEDURE — 2500000004 HC RX 250 GENERAL PHARMACY W/ HCPCS (ALT 636 FOR OP/ED): Performed by: ORTHOPAEDIC SURGERY

## 2025-02-05 PROCEDURE — 7100000002 HC RECOVERY ROOM TIME - EACH INCREMENTAL 1 MINUTE: Performed by: ORTHOPAEDIC SURGERY

## 2025-02-05 PROCEDURE — 2500000005 HC RX 250 GENERAL PHARMACY W/O HCPCS: Performed by: ORTHOPAEDIC SURGERY

## 2025-02-05 PROCEDURE — 64415 NJX AA&/STRD BRCH PLXS IMG: CPT | Performed by: STUDENT IN AN ORGANIZED HEALTH CARE EDUCATION/TRAINING PROGRAM

## 2025-02-05 PROCEDURE — A23472 PR RECONSTR TOTAL SHOULDER IMPLANT

## 2025-02-05 PROCEDURE — 23472 RECONSTRUCT SHOULDER JOINT: CPT | Performed by: PHYSICIAN ASSISTANT

## 2025-02-05 PROCEDURE — 2500000004 HC RX 250 GENERAL PHARMACY W/ HCPCS (ALT 636 FOR OP/ED): Performed by: EMERGENCY MEDICINE

## 2025-02-05 PROCEDURE — 2500000002 HC RX 250 W HCPCS SELF ADMINISTERED DRUGS (ALT 637 FOR MEDICARE OP, ALT 636 FOR OP/ED): Performed by: PHYSICIAN ASSISTANT

## 2025-02-05 PROCEDURE — 2500000001 HC RX 250 WO HCPCS SELF ADMINISTERED DRUGS (ALT 637 FOR MEDICARE OP): Performed by: PHYSICIAN ASSISTANT

## 2025-02-05 PROCEDURE — C1776 JOINT DEVICE (IMPLANTABLE): HCPCS | Performed by: ORTHOPAEDIC SURGERY

## 2025-02-05 PROCEDURE — 23430 REPAIR BICEPS TENDON: CPT | Performed by: ORTHOPAEDIC SURGERY

## 2025-02-05 PROCEDURE — 3700000001 HC GENERAL ANESTHESIA TIME - INITIAL BASE CHARGE: Performed by: ORTHOPAEDIC SURGERY

## 2025-02-05 PROCEDURE — 2500000005 HC RX 250 GENERAL PHARMACY W/O HCPCS

## 2025-02-05 PROCEDURE — 9420000001 HC RT PATIENT EDUCATION 5 MIN

## 2025-02-05 PROCEDURE — A4649 SURGICAL SUPPLIES: HCPCS | Performed by: ORTHOPAEDIC SURGERY

## 2025-02-05 PROCEDURE — 94760 N-INVAS EAR/PLS OXIMETRY 1: CPT

## 2025-02-05 PROCEDURE — 99100 ANES PT EXTEME AGE<1 YR&>70: CPT | Performed by: STUDENT IN AN ORGANIZED HEALTH CARE EDUCATION/TRAINING PROGRAM

## 2025-02-05 PROCEDURE — 2720000007 HC OR 272 NO HCPCS: Performed by: ORTHOPAEDIC SURGERY

## 2025-02-05 PROCEDURE — 2500000005 HC RX 250 GENERAL PHARMACY W/O HCPCS: Performed by: PHYSICIAN ASSISTANT

## 2025-02-05 DEVICE — IMPLANTABLE DEVICE: Type: IMPLANTABLE DEVICE | Site: SHOULDER | Status: FUNCTIONAL

## 2025-02-05 DEVICE — BASEPLATE, GLENIOD, MODULAR, 24MM +4 LAT: Type: IMPLANTABLE DEVICE | Site: SHOULDER | Status: FUNCTIONAL

## 2025-02-05 DEVICE — POST, MODULAR CENTRAL, 25MM: Type: IMPLANTABLE DEVICE | Site: SHOULDER | Status: FUNCTIONAL

## 2025-02-05 DEVICE — GLENOID PIN, TARGETER, 2.8MM, STAINLESS: Type: IMPLANTABLE DEVICE | Site: SHOULDER | Status: NON-FUNCTIONAL

## 2025-02-05 RX ORDER — POLYETHYLENE GLYCOL 3350 17 G/17G
17 POWDER, FOR SOLUTION ORAL DAILY
Status: DISCONTINUED | OUTPATIENT
Start: 2025-02-06 | End: 2025-02-06 | Stop reason: HOSPADM

## 2025-02-05 RX ORDER — METFORMIN HYDROCHLORIDE 500 MG/1
500 TABLET ORAL
Status: DISCONTINUED | OUTPATIENT
Start: 2025-02-05 | End: 2025-02-06 | Stop reason: HOSPADM

## 2025-02-05 RX ORDER — ROCURONIUM BROMIDE 10 MG/ML
INJECTION, SOLUTION INTRAVENOUS AS NEEDED
Status: DISCONTINUED | OUTPATIENT
Start: 2025-02-05 | End: 2025-02-05

## 2025-02-05 RX ORDER — ATORVASTATIN CALCIUM 20 MG/1
10 TABLET, FILM COATED ORAL NIGHTLY
Status: DISCONTINUED | OUTPATIENT
Start: 2025-02-05 | End: 2025-02-06 | Stop reason: HOSPADM

## 2025-02-05 RX ORDER — MIDAZOLAM HYDROCHLORIDE 1 MG/ML
2 INJECTION, SOLUTION INTRAMUSCULAR; INTRAVENOUS ONCE
Status: COMPLETED | OUTPATIENT
Start: 2025-02-05 | End: 2025-02-05

## 2025-02-05 RX ORDER — TRANEXAMIC ACID 100 MG/ML
INJECTION, SOLUTION INTRAVENOUS AS NEEDED
Status: DISCONTINUED | OUTPATIENT
Start: 2025-02-05 | End: 2025-02-05

## 2025-02-05 RX ORDER — NORETHINDRONE AND ETHINYL ESTRADIOL 0.5-0.035
25 KIT ORAL ONCE AS NEEDED
Status: CANCELLED | OUTPATIENT
Start: 2025-02-05

## 2025-02-05 RX ORDER — VANCOMYCIN HYDROCHLORIDE 1 G/20ML
INJECTION, POWDER, LYOPHILIZED, FOR SOLUTION INTRAVENOUS AS NEEDED
Status: DISCONTINUED | OUTPATIENT
Start: 2025-02-05 | End: 2025-02-05 | Stop reason: HOSPADM

## 2025-02-05 RX ORDER — CALCIUM CARBONATE 200(500)MG
500 TABLET,CHEWABLE ORAL 4 TIMES DAILY PRN
Status: DISCONTINUED | OUTPATIENT
Start: 2025-02-05 | End: 2025-02-06 | Stop reason: HOSPADM

## 2025-02-05 RX ORDER — ONDANSETRON HYDROCHLORIDE 2 MG/ML
INJECTION, SOLUTION INTRAVENOUS AS NEEDED
Status: DISCONTINUED | OUTPATIENT
Start: 2025-02-05 | End: 2025-02-05

## 2025-02-05 RX ORDER — ONDANSETRON HYDROCHLORIDE 2 MG/ML
4 INJECTION, SOLUTION INTRAVENOUS ONCE AS NEEDED
Status: COMPLETED | OUTPATIENT
Start: 2025-02-05 | End: 2025-02-05

## 2025-02-05 RX ORDER — OXYCODONE HYDROCHLORIDE 5 MG/1
5 TABLET ORAL EVERY 4 HOURS PRN
Status: DISCONTINUED | OUTPATIENT
Start: 2025-02-05 | End: 2025-02-05 | Stop reason: HOSPADM

## 2025-02-05 RX ORDER — CEFAZOLIN SODIUM 2 G/100ML
2 INJECTION, SOLUTION INTRAVENOUS ONCE
Status: COMPLETED | OUTPATIENT
Start: 2025-02-05 | End: 2025-02-05

## 2025-02-05 RX ORDER — SODIUM CHLORIDE, SODIUM LACTATE, POTASSIUM CHLORIDE, CALCIUM CHLORIDE 600; 310; 30; 20 MG/100ML; MG/100ML; MG/100ML; MG/100ML
75 INJECTION, SOLUTION INTRAVENOUS CONTINUOUS
Status: DISCONTINUED | OUTPATIENT
Start: 2025-02-05 | End: 2025-02-05 | Stop reason: HOSPADM

## 2025-02-05 RX ORDER — FENTANYL CITRATE 50 UG/ML
50 INJECTION, SOLUTION INTRAMUSCULAR; INTRAVENOUS ONCE
Status: COMPLETED | OUTPATIENT
Start: 2025-02-05 | End: 2025-02-05

## 2025-02-05 RX ORDER — ONDANSETRON HYDROCHLORIDE 2 MG/ML
4 INJECTION, SOLUTION INTRAVENOUS EVERY 8 HOURS PRN
Status: DISCONTINUED | OUTPATIENT
Start: 2025-02-05 | End: 2025-02-06 | Stop reason: HOSPADM

## 2025-02-05 RX ORDER — SODIUM CHLORIDE, SODIUM LACTATE, POTASSIUM CHLORIDE, CALCIUM CHLORIDE 600; 310; 30; 20 MG/100ML; MG/100ML; MG/100ML; MG/100ML
100 INJECTION, SOLUTION INTRAVENOUS CONTINUOUS
Status: DISCONTINUED | OUTPATIENT
Start: 2025-02-05 | End: 2025-02-06 | Stop reason: HOSPADM

## 2025-02-05 RX ORDER — PANTOPRAZOLE SODIUM 40 MG/1
40 TABLET, DELAYED RELEASE ORAL
Status: DISCONTINUED | OUTPATIENT
Start: 2025-02-06 | End: 2025-02-06 | Stop reason: HOSPADM

## 2025-02-05 RX ORDER — OXYCODONE HYDROCHLORIDE 5 MG/1
10 TABLET ORAL EVERY 4 HOURS PRN
Status: DISCONTINUED | OUTPATIENT
Start: 2025-02-05 | End: 2025-02-05 | Stop reason: HOSPADM

## 2025-02-05 RX ORDER — PROPOFOL 10 MG/ML
INJECTION, EMULSION INTRAVENOUS AS NEEDED
Status: DISCONTINUED | OUTPATIENT
Start: 2025-02-05 | End: 2025-02-05

## 2025-02-05 RX ORDER — CEFAZOLIN SODIUM 1 G/50ML
1 SOLUTION INTRAVENOUS EVERY 8 HOURS
Status: COMPLETED | OUTPATIENT
Start: 2025-02-05 | End: 2025-02-06

## 2025-02-05 RX ORDER — LIDOCAINE HYDROCHLORIDE 10 MG/ML
INJECTION, SOLUTION EPIDURAL; INFILTRATION; INTRACAUDAL; PERINEURAL AS NEEDED
Status: DISCONTINUED | OUTPATIENT
Start: 2025-02-05 | End: 2025-02-05

## 2025-02-05 RX ORDER — HYDROMORPHONE HYDROCHLORIDE 0.2 MG/ML
0.2 INJECTION INTRAMUSCULAR; INTRAVENOUS; SUBCUTANEOUS EVERY 5 MIN PRN
Status: DISCONTINUED | OUTPATIENT
Start: 2025-02-05 | End: 2025-02-05 | Stop reason: HOSPADM

## 2025-02-05 RX ORDER — SIMETHICONE 80 MG
80 TABLET,CHEWABLE ORAL 4 TIMES DAILY PRN
Status: DISCONTINUED | OUTPATIENT
Start: 2025-02-05 | End: 2025-02-06 | Stop reason: HOSPADM

## 2025-02-05 RX ORDER — TRAMADOL HYDROCHLORIDE 50 MG/1
50 TABLET ORAL EVERY 8 HOURS PRN
COMMUNITY

## 2025-02-05 RX ORDER — ACETAMINOPHEN 325 MG/1
650 TABLET ORAL EVERY 6 HOURS SCHEDULED
Status: DISCONTINUED | OUTPATIENT
Start: 2025-02-05 | End: 2025-02-06 | Stop reason: HOSPADM

## 2025-02-05 RX ORDER — ONDANSETRON 4 MG/1
4 TABLET, ORALLY DISINTEGRATING ORAL EVERY 8 HOURS PRN
Status: DISCONTINUED | OUTPATIENT
Start: 2025-02-05 | End: 2025-02-06 | Stop reason: HOSPADM

## 2025-02-05 RX ORDER — ALBUTEROL SULFATE 0.83 MG/ML
2.5 SOLUTION RESPIRATORY (INHALATION) ONCE AS NEEDED
Status: DISCONTINUED | OUTPATIENT
Start: 2025-02-05 | End: 2025-02-05 | Stop reason: HOSPADM

## 2025-02-05 RX ORDER — TALC
3 POWDER (GRAM) TOPICAL NIGHTLY PRN
Status: DISCONTINUED | OUTPATIENT
Start: 2025-02-05 | End: 2025-02-06 | Stop reason: HOSPADM

## 2025-02-05 RX ORDER — MIDAZOLAM HYDROCHLORIDE 1 MG/ML
2 INJECTION, SOLUTION INTRAMUSCULAR; INTRAVENOUS ONCE
Status: DISCONTINUED | OUTPATIENT
Start: 2025-02-05 | End: 2025-02-05 | Stop reason: HOSPADM

## 2025-02-05 RX ORDER — TRANEXAMIC ACID 100 MG/ML
INJECTION, SOLUTION INTRAVENOUS AS NEEDED
Status: DISCONTINUED | OUTPATIENT
Start: 2025-02-05 | End: 2025-02-05 | Stop reason: HOSPADM

## 2025-02-05 RX ORDER — GLUCOSAMINE/CHONDRO SU A 500-400 MG
1 TABLET ORAL EVERY MORNING
COMMUNITY

## 2025-02-05 RX ORDER — LISINOPRIL 10 MG/1
10 TABLET ORAL DAILY
Status: DISCONTINUED | OUTPATIENT
Start: 2025-02-06 | End: 2025-02-06 | Stop reason: HOSPADM

## 2025-02-05 RX ORDER — PHENYLEPHRINE HCL IN 0.9% NACL 1 MG/10 ML
SYRINGE (ML) INTRAVENOUS AS NEEDED
Status: DISCONTINUED | OUTPATIENT
Start: 2025-02-05 | End: 2025-02-05

## 2025-02-05 RX ORDER — GABAPENTIN 300 MG/1
600 CAPSULE ORAL 3 TIMES DAILY
Status: DISCONTINUED | OUTPATIENT
Start: 2025-02-05 | End: 2025-02-06 | Stop reason: HOSPADM

## 2025-02-05 RX ORDER — ASPIRIN 81 MG/1
81 TABLET ORAL DAILY
Status: DISCONTINUED | OUTPATIENT
Start: 2025-02-06 | End: 2025-02-06 | Stop reason: HOSPADM

## 2025-02-05 RX ORDER — ACETAMINOPHEN 325 MG/1
650 TABLET ORAL EVERY 4 HOURS PRN
Status: DISCONTINUED | OUTPATIENT
Start: 2025-02-05 | End: 2025-02-05 | Stop reason: HOSPADM

## 2025-02-05 RX ORDER — OXYCODONE AND ACETAMINOPHEN 5; 325 MG/1; MG/1
1 TABLET ORAL EVERY 6 HOURS PRN
Status: DISCONTINUED | OUTPATIENT
Start: 2025-02-05 | End: 2025-02-06 | Stop reason: HOSPADM

## 2025-02-05 RX ORDER — DOCUSATE SODIUM 100 MG/1
100 CAPSULE, LIQUID FILLED ORAL 2 TIMES DAILY
Status: DISCONTINUED | OUTPATIENT
Start: 2025-02-05 | End: 2025-02-06 | Stop reason: HOSPADM

## 2025-02-05 RX ORDER — SODIUM CHLORIDE, SODIUM LACTATE, POTASSIUM CHLORIDE, CALCIUM CHLORIDE 600; 310; 30; 20 MG/100ML; MG/100ML; MG/100ML; MG/100ML
INJECTION, SOLUTION INTRAVENOUS CONTINUOUS PRN
Status: DISCONTINUED | OUTPATIENT
Start: 2025-02-05 | End: 2025-02-05

## 2025-02-05 RX ORDER — SIMVASTATIN 20 MG/1
20 TABLET, FILM COATED ORAL NIGHTLY
Status: DISCONTINUED | OUTPATIENT
Start: 2025-02-05 | End: 2025-02-05

## 2025-02-05 RX ADMIN — CEFAZOLIN SODIUM 1 G: 1 SOLUTION INTRAVENOUS at 23:58

## 2025-02-05 RX ADMIN — FENTANYL CITRATE 50 MCG: 50 INJECTION INTRAMUSCULAR; INTRAVENOUS at 13:52

## 2025-02-05 RX ADMIN — LIDOCAINE HYDROCHLORIDE 5 ML: 10 INJECTION, SOLUTION EPIDURAL; INFILTRATION; INTRACAUDAL; PERINEURAL at 14:57

## 2025-02-05 RX ADMIN — METFORMIN HYDROCHLORIDE 500 MG: 500 TABLET ORAL at 18:54

## 2025-02-05 RX ADMIN — Medication 2 L/MIN: at 18:14

## 2025-02-05 RX ADMIN — ACETAMINOPHEN 650 MG: 325 TABLET ORAL at 23:54

## 2025-02-05 RX ADMIN — FENTANYL CITRATE 50 MCG: 0.05 INJECTION, SOLUTION INTRAMUSCULAR; INTRAVENOUS at 14:54

## 2025-02-05 RX ADMIN — TRANEXAMIC ACID 1000 MG: 100 INJECTION, SOLUTION INTRAVENOUS at 15:12

## 2025-02-05 RX ADMIN — ACETAMINOPHEN 650 MG: 325 TABLET ORAL at 18:54

## 2025-02-05 RX ADMIN — ATORVASTATIN CALCIUM 10 MG: 20 TABLET, FILM COATED ORAL at 20:48

## 2025-02-05 RX ADMIN — CEFAZOLIN SODIUM 2 G: 2 INJECTION, SOLUTION INTRAVENOUS at 15:01

## 2025-02-05 RX ADMIN — ONDANSETRON 4 MG: 2 INJECTION, SOLUTION INTRAMUSCULAR; INTRAVENOUS at 16:06

## 2025-02-05 RX ADMIN — SODIUM CHLORIDE, SODIUM LACTATE, POTASSIUM CHLORIDE, AND CALCIUM CHLORIDE 100 ML/HR: 600; 310; 30; 20 INJECTION, SOLUTION INTRAVENOUS at 18:55

## 2025-02-05 RX ADMIN — MIDAZOLAM 2 MG: 1 INJECTION INTRAMUSCULAR; INTRAVENOUS at 13:52

## 2025-02-05 RX ADMIN — Medication 100 MCG: at 15:53

## 2025-02-05 RX ADMIN — PROPOFOL 100 MG: 10 INJECTION, EMULSION INTRAVENOUS at 14:57

## 2025-02-05 RX ADMIN — GABAPENTIN 600 MG: 300 CAPSULE ORAL at 20:48

## 2025-02-05 RX ADMIN — SODIUM CHLORIDE, POTASSIUM CHLORIDE, SODIUM LACTATE AND CALCIUM CHLORIDE: 600; 310; 30; 20 INJECTION, SOLUTION INTRAVENOUS at 14:51

## 2025-02-05 RX ADMIN — DEXAMETHASONE SODIUM PHOSPHATE 4 MG: 4 INJECTION, SOLUTION INTRAMUSCULAR; INTRAVENOUS at 15:26

## 2025-02-05 RX ADMIN — FENTANYL CITRATE 50 MCG: 0.05 INJECTION, SOLUTION INTRAMUSCULAR; INTRAVENOUS at 15:24

## 2025-02-05 RX ADMIN — SUGAMMADEX 200 MG: 100 INJECTION, SOLUTION INTRAVENOUS at 16:29

## 2025-02-05 RX ADMIN — DOCUSATE SODIUM 100 MG: 100 CAPSULE, LIQUID FILLED ORAL at 20:48

## 2025-02-05 RX ADMIN — PROPOFOL 50 MG: 10 INJECTION, EMULSION INTRAVENOUS at 14:58

## 2025-02-05 RX ADMIN — ROCURONIUM BROMIDE 50 MG: 10 INJECTION, SOLUTION INTRAVENOUS at 14:59

## 2025-02-05 RX ADMIN — ONDANSETRON 4 MG: 2 INJECTION INTRAMUSCULAR; INTRAVENOUS at 16:47

## 2025-02-05 SDOH — ECONOMIC STABILITY: INCOME INSECURITY: IN THE PAST 12 MONTHS HAS THE ELECTRIC, GAS, OIL, OR WATER COMPANY THREATENED TO SHUT OFF SERVICES IN YOUR HOME?: NO

## 2025-02-05 SDOH — SOCIAL STABILITY: SOCIAL INSECURITY: WITHIN THE LAST YEAR, HAVE YOU BEEN AFRAID OF YOUR PARTNER OR EX-PARTNER?: NO

## 2025-02-05 SDOH — SOCIAL STABILITY: SOCIAL INSECURITY: HAS ANYONE EVER THREATENED TO HURT YOUR FAMILY OR YOUR PETS?: NO

## 2025-02-05 SDOH — SOCIAL STABILITY: SOCIAL INSECURITY: WERE YOU ABLE TO COMPLETE ALL THE BEHAVIORAL HEALTH SCREENINGS?: YES

## 2025-02-05 SDOH — SOCIAL STABILITY: SOCIAL INSECURITY: DOES ANYONE TRY TO KEEP YOU FROM HAVING/CONTACTING OTHER FRIENDS OR DOING THINGS OUTSIDE YOUR HOME?: NO

## 2025-02-05 SDOH — SOCIAL STABILITY: SOCIAL INSECURITY: WITHIN THE LAST YEAR, HAVE YOU BEEN HUMILIATED OR EMOTIONALLY ABUSED IN OTHER WAYS BY YOUR PARTNER OR EX-PARTNER?: NO

## 2025-02-05 SDOH — ECONOMIC STABILITY: FOOD INSECURITY: WITHIN THE PAST 12 MONTHS, THE FOOD YOU BOUGHT JUST DIDN'T LAST AND YOU DIDN'T HAVE MONEY TO GET MORE.: NEVER TRUE

## 2025-02-05 SDOH — ECONOMIC STABILITY: FOOD INSECURITY: WITHIN THE PAST 12 MONTHS, YOU WORRIED THAT YOUR FOOD WOULD RUN OUT BEFORE YOU GOT THE MONEY TO BUY MORE.: NEVER TRUE

## 2025-02-05 SDOH — SOCIAL STABILITY: SOCIAL INSECURITY: ABUSE: ADULT

## 2025-02-05 SDOH — ECONOMIC STABILITY: HOUSING INSECURITY: IN THE LAST 12 MONTHS, WAS THERE A TIME WHEN YOU WERE NOT ABLE TO PAY THE MORTGAGE OR RENT ON TIME?: NO

## 2025-02-05 SDOH — ECONOMIC STABILITY: HOUSING INSECURITY: AT ANY TIME IN THE PAST 12 MONTHS, WERE YOU HOMELESS OR LIVING IN A SHELTER (INCLUDING NOW)?: NO

## 2025-02-05 SDOH — SOCIAL STABILITY: SOCIAL INSECURITY
WITHIN THE LAST YEAR, HAVE YOU BEEN RAPED OR FORCED TO HAVE ANY KIND OF SEXUAL ACTIVITY BY YOUR PARTNER OR EX-PARTNER?: NO

## 2025-02-05 SDOH — SOCIAL STABILITY: SOCIAL INSECURITY: ARE THERE ANY APPARENT SIGNS OF INJURIES/BEHAVIORS THAT COULD BE RELATED TO ABUSE/NEGLECT?: NO

## 2025-02-05 SDOH — SOCIAL STABILITY: SOCIAL INSECURITY: HAVE YOU HAD THOUGHTS OF HARMING ANYONE ELSE?: NO

## 2025-02-05 SDOH — SOCIAL STABILITY: SOCIAL INSECURITY: DO YOU FEEL UNSAFE GOING BACK TO THE PLACE WHERE YOU ARE LIVING?: NO

## 2025-02-05 SDOH — ECONOMIC STABILITY: HOUSING INSECURITY: IN THE PAST 12 MONTHS, HOW MANY TIMES HAVE YOU MOVED WHERE YOU WERE LIVING?: 0

## 2025-02-05 SDOH — ECONOMIC STABILITY: FOOD INSECURITY: HOW HARD IS IT FOR YOU TO PAY FOR THE VERY BASICS LIKE FOOD, HOUSING, MEDICAL CARE, AND HEATING?: NOT HARD AT ALL

## 2025-02-05 SDOH — ECONOMIC STABILITY: TRANSPORTATION INSECURITY: IN THE PAST 12 MONTHS, HAS LACK OF TRANSPORTATION KEPT YOU FROM MEDICAL APPOINTMENTS OR FROM GETTING MEDICATIONS?: NO

## 2025-02-05 SDOH — SOCIAL STABILITY: SOCIAL INSECURITY: ARE YOU OR HAVE YOU BEEN THREATENED OR ABUSED PHYSICALLY, EMOTIONALLY, OR SEXUALLY BY ANYONE?: NO

## 2025-02-05 SDOH — SOCIAL STABILITY: SOCIAL INSECURITY: DO YOU FEEL ANYONE HAS EXPLOITED OR TAKEN ADVANTAGE OF YOU FINANCIALLY OR OF YOUR PERSONAL PROPERTY?: NO

## 2025-02-05 ASSESSMENT — PAIN SCALES - GENERAL
PAINLEVEL_OUTOF10: 2
PAINLEVEL_OUTOF10: 0 - NO PAIN
PAINLEVEL_OUTOF10: 3
PAINLEVEL_OUTOF10: 0 - NO PAIN
PAINLEVEL_OUTOF10: 5 - MODERATE PAIN

## 2025-02-05 ASSESSMENT — ACTIVITIES OF DAILY LIVING (ADL)
LACK_OF_TRANSPORTATION: NO
HEARING - RIGHT EAR: FUNCTIONAL
HEARING - LEFT EAR: FUNCTIONAL
FEEDING YOURSELF: INDEPENDENT
TOILETING: INDEPENDENT
DRESSING YOURSELF: INDEPENDENT
WALKS IN HOME: INDEPENDENT
GROOMING: INDEPENDENT
ADEQUATE_TO_COMPLETE_ADL: YES
JUDGMENT_ADEQUATE_SAFELY_COMPLETE_DAILY_ACTIVITIES: YES
LACK_OF_TRANSPORTATION: NO
BATHING: INDEPENDENT
PATIENT'S MEMORY ADEQUATE TO SAFELY COMPLETE DAILY ACTIVITIES?: YES

## 2025-02-05 ASSESSMENT — COGNITIVE AND FUNCTIONAL STATUS - GENERAL
HELP NEEDED FOR BATHING: A LITTLE
DAILY ACTIVITIY SCORE: 16
CLIMB 3 TO 5 STEPS WITH RAILING: A LITTLE
EATING MEALS: A LITTLE
TOILETING: A LITTLE
DRESSING REGULAR LOWER BODY CLOTHING: A LOT
PATIENT BASELINE BEDBOUND: NO
PERSONAL GROOMING: A LITTLE
DRESSING REGULAR UPPER BODY CLOTHING: A LOT
STANDING UP FROM CHAIR USING ARMS: A LITTLE
TURNING FROM BACK TO SIDE WHILE IN FLAT BAD: A LITTLE
MOBILITY SCORE: 18
MOVING TO AND FROM BED TO CHAIR: A LITTLE
WALKING IN HOSPITAL ROOM: A LITTLE
MOVING FROM LYING ON BACK TO SITTING ON SIDE OF FLAT BED WITH BEDRAILS: A LITTLE

## 2025-02-05 ASSESSMENT — LIFESTYLE VARIABLES
HOW MANY STANDARD DRINKS CONTAINING ALCOHOL DO YOU HAVE ON A TYPICAL DAY: 1 OR 2
HOW OFTEN DO YOU HAVE 6 OR MORE DRINKS ON ONE OCCASION: NEVER
SKIP TO QUESTIONS 9-10: 1
AUDIT-C TOTAL SCORE: 2
AUDIT-C TOTAL SCORE: 2
HOW OFTEN DO YOU HAVE A DRINK CONTAINING ALCOHOL: 2-4 TIMES A MONTH

## 2025-02-05 ASSESSMENT — PATIENT HEALTH QUESTIONNAIRE - PHQ9
2. FEELING DOWN, DEPRESSED OR HOPELESS: NOT AT ALL
1. LITTLE INTEREST OR PLEASURE IN DOING THINGS: NOT AT ALL
SUM OF ALL RESPONSES TO PHQ9 QUESTIONS 1 & 2: 0

## 2025-02-05 ASSESSMENT — COLUMBIA-SUICIDE SEVERITY RATING SCALE - C-SSRS
6. HAVE YOU EVER DONE ANYTHING, STARTED TO DO ANYTHING, OR PREPARED TO DO ANYTHING TO END YOUR LIFE?: NO
1. IN THE PAST MONTH, HAVE YOU WISHED YOU WERE DEAD OR WISHED YOU COULD GO TO SLEEP AND NOT WAKE UP?: NO
2. HAVE YOU ACTUALLY HAD ANY THOUGHTS OF KILLING YOURSELF?: NO

## 2025-02-05 NOTE — NURSING NOTE
Patient arrived to room 217 from PACU  at 1745. She ambulated upon arrival to the room and was able to void. She voiced no c/o pain/discomfort. Left arm in a sling.  Call light within reach, continue to monitor.

## 2025-02-05 NOTE — ANESTHESIA PREPROCEDURE EVALUATION
Patient: Edda Webb    Procedure Information       Date/Time: 02/05/25 1255    Procedures:       ARTHROPLASTY, SHOULDER, TOTAL, REVERSE (Left: Shoulder)      ORIF, SHOULDER (arthrex reverse shoulder and accumed accutrak 2 screw system) (Left: Shoulder)    Location: [facility] / [facility]    Surgeons: Rome Bear MD            Relevant Problems   Cardiac   (+) Benign essential hypertension   (+) Cardiac pacemaker   (+) Coronary artery disease involving native coronary artery of native heart without angina pectoris   (+) Mixed hyperlipidemia      Neuro   (+) Notalgia paresthetica   (+) Peripheral neuropathy   (+) Right carpal tunnel syndrome      GI   (+) GERD without esophagitis      Endocrine   (+) New onset type 2 diabetes mellitus (Multi)      Musculoskeletal   (+) Degeneration of lumbar intervertebral disc   (+) Right carpal tunnel syndrome      ID   (+) Herpes gingivostomatitis     Past Medical History:   Diagnosis Date    Abnormal finding of blood chemistry, unspecified 08/08/2016    Abnormal blood chemistry    Abnormal weight gain 03/14/2014    Weight gain    Arthritis     Asthma     Colitis 08/31/2023    Coronary artery disease     Delayed emergence from general anesthesia     Encounter for immunization 08/08/2016    Need for shingles vaccine    Fracture of distal end of radius 11/01/2018    GERD (gastroesophageal reflux disease)     H/O Bell's palsy 03/14/2012    Hyperlipidemia     Hyperlipidemia     Hypertension     Hypertension     Neuropathy     Other abnormal glucose 08/08/2016    Other abnormal glucose    Other conditions influencing health status     No significant past medical history    Pacemaker     first pacemaker 2000; 2nd one 2015 per pt report    Pain in right shoulder 02/02/2016    Right shoulder pain    Pain in right shoulder 02/02/2016    Right shoulder pain    Pathological fracture due to osteoporosis 01/10/2019    Peripheral neuropathy     Personal history of diseases of the  skin and subcutaneous tissue 08/08/2016    History of solar lentigo    Personal history of other diseases of the nervous system and sense organs 02/05/2016    History of polyneuropathy    Personal history of other specified conditions 08/08/2016    History of shortness of breath    Pneumonia     8/2023    PONV (postoperative nausea and vomiting)     Primary stabbing headache 08/08/2016    Primary stabbing headache    Shortness of breath     Syncope     Type 2 diabetes mellitus     Urinary tract infection      Past Surgical History:   Procedure Laterality Date    APPENDECTOMY      BREAST CYST EXCISION Left     CARDIAC CATHETERIZATION      HYSTERECTOMY  02/02/2016    Hysterectomy    INSERT / REPLACE / REMOVE PACEMAKER      LAPAROSCOPIC NISSEN FUNDOPLICATION      PACEMAKER PLACEMENT Left     TOTAL SHOULDER ARTHROPLASTY Right 11/15/2023    WRIST FRACTURE SURGERY Left     with hardware     Allergies   Allergen Reactions    Codeine Hives and Rash     Cath 6/22/23:  CONCLUSIONS:  1. Non-obstructive coronary artery disease.  2. LVEDP Normal of 13mmHg.    TTE 5/19/23:  CONCLUSIONS:  1. Left ventricular systolic function is normal with a 65-70% estimated ejection fraction.    Clinical information reviewed:     Meds               NPO Detail:  No data recorded     Physical Exam    Airway  Mallampati: II  TM distance: >3 FB  Neck ROM: full     Cardiovascular   Rhythm: regular  Rate: normal     Dental    Pulmonary   Breath sounds clear to auscultation     Abdominal            Anesthesia Plan    History of general anesthesia?: yes  History of complications of general anesthesia?: no    ASA 3     general and regional     intravenous induction   Postoperative administration of opioids is intended.  Trial extubation is planned.  Anesthetic plan and risks discussed with patient.  Use of blood products discussed with patient who.

## 2025-02-05 NOTE — CARE PLAN
Patient assessment BS clear diminished right lung field; unable to assess Left due to surgery location; SpO2 97% on 2L nasal cannula; removed to assess on Room Air; Patient states that she does not have Asthma; had some difficulty  breathing many years ago; does not use any respiratory medication.

## 2025-02-05 NOTE — PERIOPERATIVE NURSING NOTE
PAT PRE-OPERATIVE INSTRUCTIONS    [facility]  [address] [place] [address]  [phone]    Please let your surgeon know if:      You develop:  Open sores, shingles, burning or painful urination as these may increase your risk of an infection.   Fever=100.4 or greater   New or worsening cold or flu symptoms ( cough, shortness of breath, sore throat, respiratory distress, headache, fatigue, GI symptoms)   You no longer wish to have the surgery.   Any other personal circumstances change that may lead to the need to cancel or defer this surgery-such as being sick or getting admitted to any hospital within one week of your planned procedure.    Your contact details change, such as a change of address or phone number.    Starting now:     Please DO NOT drink alcohol or smoke for 24 hours before surgery. It is well known that quitting smoking can make a huge difference to your health and recovery from surgery. The longer you abstain from smoking, the better your chances of a healthy recovery. If you need help with quitting, call 1-800-QUIT-NOW to be connected to a trained counselor who will discuss the best methods to help you quit.     Before your surgery:    Please stop all supplements/ vitamins 7 days prior to surgery (or as directed by your surgeon).   Please stop taking NSAID pain medicine such as Advil, Ibuprofen, and Motrin 7 days before surgery.    If you develop any fever, cough, cold, rashes, cuts, scratches, scrapes, urinary symptoms or infection anywhere on your body (including teeth and gums) prior to surgery, please call your surgeon’s office as soon as possible. This may require treatment to reduce the chance of cancellation on the day of surgery.    The day before your surgery:   DIET- Do not eat any food after MIDNIGHT.   Get a good night’s rest.  Use the special soap for bathing if you have been instructed to use one.    Scheduled surgery times may change  and you will be notified if this occurs - please check your personal voicemail for any updates.     On the morning of surgery:   Wear comfortable, loose fitting clothes which open in the front.   Shower and please do not wear moisturizers, creams, lotions, deodorants, makeup or perfume.    Please bring with you to surgery:   Photo ID and insurance card   Current list of medicines and allergies   Pacemaker/ Defibrillator/Heart stent cards as well as remote controls for implanted devices    CPAP machine and mask    Slings/ splints/ crutches   A copy of your complete advanced directive/DHPOA.    Please do NOT bring with you to surgery:   All jewelry and valuables should be left at home.   Prosthetic devices such as contact lenses, glasses, hearing aids, dentures, eyelash extensions, hairpins and body piercings must be removed prior to going in to the surgical suite. If you have a case for these items, please bring it with you on surgery day.    *Patients under the age of 18: A responsible adult must be present and remaining in the building throughout the surgical visit.    After outpatient surgery:   A responsible adult MUST accompany you at the time of discharge and stay with you for 24 hours after your surgery. You may NOT drive yourself home after surgery.    Do not drive, operate machinery, make critical decisions or do activities that require co-ordination or balance until after a night’s sleep.   Do not drink alcoholic beverages for 24 hours.   Instructions for resuming your medications will be provided by your surgeon.    CALL YOUR DOCTOR AFTER SURGERY IF YOU HAVE:     Chills and/or a fever of 101° F or higher.    Redness, swelling, pus or drainage from your surgical wound or a bad smell from the wound.    Lightheadedness, fainting or confusion.    Persistent vomiting (throwing up) and are not able to eat or drink for 12 hours.    Three or more loose, watery bowel movements in 24 hours (diarrhea).   Difficulty  or pain while urinating( after non-urological surgery)    Pain and swelling in your legs, especially if it is only on one side.    Difficulty breathing or are breathing faster than normal.    Any new concerning symptoms.      Reviewed pre-op instructions with patient, states understanding and denies further questions at this time. Pt is for surgery today and states she was told at Dr. Bear's office to hold all medications this morning before surgery.    Take Care Edda!

## 2025-02-05 NOTE — OP NOTE
ARTHROPLASTY, SHOULDER, TOTAL, REVERSE & BICEP TENODESIS (L) Operative Note     Date: 2025  OR Location: [facility]    Name: Edda Webb, : 1950, Age: 75 y.o., MRN: [id number], Sex: female    Diagnosis  Pre-op Diagnosis      * Displaced fracture of glenoid cavity of scapula, left shoulder, initial encounter for closed fracture [S42.142A] Post-op Diagnosis     * Displaced fracture of glenoid cavity of scapula, left shoulder, initial encounter for closed fracture [S42.142A]     Procedures  ARTHROPLASTY, SHOULDER, TOTAL, REVERSE & BICEP TENODESIS  19743 - SC ARTHROPLASTY GLENOHUMERAL JOINT TOTAL SHOULDER    #1 left Arthrex reverse total shoulder replacement  #2 left shoulder open biceps tenodesis  Surgeons      * Rome Bear - Primary    Resident/Fellow/Other Assistant:  Surgeons and Role:  * No surgeons found with a matching role *  Debi Stover PA-C  Staff:   Circulator: Ailyn Sainiub Person: Franny Trinidad Person: Virginia Salazar Scrub: Larissa Salazar Circulator: Laure    Anesthesia Staff: Anesthesiologist: Angela Valderrama MD  CRNA: YRIS Farmer-RADHA    Procedure Summary  Anesthesia: Regional, General  ASA: III  Estimated Blood Loss: 65mL  Intra-op Medications:   Administrations occurring from 1255 to 1555 on 25:   Medication Name Total Dose   dexAMETHasone (Decadron) injection 4 mg/mL 4 mg   LR infusion Cannot be calculated   lidocaine PF (Xylocaine-MPF) local injection 1 % 5 mL   phenylephrine 100 mcg/mL syringe 10 mL (prefilled) 100 mcg   propofol (Diprivan) injection 10 mg/mL 150 mg   rocuronium (ZeMuron) 50 mg/5 mL injection 50 mg   tranexamic acid (Cyklokapron) injection 1,000 mg   ceFAZolin (Ancef) 2 g in dextrose (iso)  mL 2 g   fentaNYL PF (Sublimaze) injection 50 mcg 100 mcg   fentaNYL PF (Sublimaze) injection 50 mcg 50 mcg   midazolam (Versed) injection 2 mg 2 mg              Anesthesia Record               Intraprocedure I/O Totals          Intake    Tranexamic  Acid 0.00 mL    The total shown is the total volume documented since Anesthesia Start was filed.    LR infusion 500.00 mL    Total Intake 500 mL          Specimen: No specimens collected              Drains and/or Catheters: * None in log *    Tourniquet Times:         Implants:  Implants       Type Name Action Serial No.      Joint GLENOID PIN, TARGETER, 2.8MM, STAINLESS - [id number] Used, Not Implanted      Joint BASEPLATE, GLENIOD, MODULAR, 24MM +4 LAT - [id number] Implanted      Implant POST, MODULAR CENTRAL, 25MM - [id number] Implanted      Screw SCREW, LOCKING, 5.5MM X 28MM - [id number] Implanted      Screw SCREW, LOCKING, 5.5MM X 32MM - [id number] Implanted      Joint GLENOSPHERE, LATERALIZED, 36+ 4 , 24MM BASEPLATE TAPER - [id number] Implanted      Joint STEM, HUMERAL, UNIVERS REVERS, 5MM - [id number] Implanted      Implant [employer] Revers. Suture Cup, 33, neutral Implanted      Implant Constrained Humeral Insert, Modular Glenoid System Implanted               Findings: Grossly unstable shoulder with an anterior rim fracture with chronic pain.  No obvious deltoid injury but with her severe pain there could be a partial axillary nerve palsy but she is able to fire her deltoid even though she has pain with flexion extension of the shoulder throughout limited range of motion.  No numbness over the lateral arm no other gross distally injury.  Patient understands that with the severe injury she is at risk for failure of the reverse with loosening and breakdown of the baseplate needing conversion to a hemiarthroplasty which will affect long-term function our goal is to give her is good of function she has on her opposite side understands the severe risk wish to proceed    Indications: Edda Webb is an 75 y.o. female who is having surgery for Displaced fracture of glenoid cavity of scapula, left shoulder, initial encounter for closed fracture [S42.142A].  Understands fully the risk again informed consent  obtained    The patient was seen in the preoperative area. The risks, benefits, complications, treatment options, non-operative alternatives, expected recovery and outcomes were discussed with the patient. The possibilities of reaction to medication, pulmonary aspiration, injury to surrounding structures, bleeding, recurrent infection, the need for additional procedures, failure to diagnose a condition, and creating a complication requiring transfusion or operation were discussed with the patient. The patient concurred with the proposed plan, giving informed consent.  The site of surgery was properly noted/marked if necessary per policy. The patient has been actively warmed in preoperative area. Preoperative antibiotics have been ordered and given within 1 hours of incision. Venous thrombosis prophylaxis have been ordered including bilateral sequential compression devices    Procedure Details: Pleasant patient brought the operating room and after sterilely prepping draping and performing a timeout clinical exam showed that she did easily sublux anteriorly.  We did a standard deltopectoral approach protecting the cephalic vein we immediately saw she had a large subscap tear ripped off the capsule we pulled back the subscapularis was deteriorated there was posterior cuff intact but some tearing of the supraspinatus.  We cut the head in standard version and expose the glenoid here there was a rim fracture mostly cartilage with a small amount of bone and at this point we excised this piece and then placed our central pin at this point we still had good enough bone superior and inferior with over 75% of the baseplate resting on native bone.  No rocking.  The central 25 degree screw was totally contained in the vault and had nice purchase.  At this point we placed our baseplate with a total of 8 mm of offset and we used a slightly larger glenoid 36 with a 33 baseplate we used superior and inferior locking screws had  excellent purchase but no anterior posterior screws due to the small nature of the fracture.  We had good stability and at this point placed our glenoid with central screw and this was impacted fully.  At this point went up to a size 5 on the humeral side good fit and fill used a +3 conforming insert final implants were placed previously the biceps was subluxed with the torn subscapularis the subscap was deteriorated and was resected we performed a biceps tenodesis to the partially pectoralis major tendon we had good stability good range of motion at this point no impingement we copious irrigated with topical TXA chlorhexidine wash some vancomycin powder layered closure performed she was placed in abduction pillow sling there were no complications Debi Stover PA-C acted as surgical assistant during this case and her assistance greatly reduced operative time and aided in performance of the case  Complications:  None; patient tolerated the procedure well.    Disposition: PACU - hemodynamically stable.  Condition: stable                 Additional Details: 0    Attending Attestation: I performed the procedure.    Rome Bear  Phone Number: [phone]

## 2025-02-05 NOTE — ANESTHESIA PROCEDURE NOTES
Peripheral Block    Patient location during procedure: pre-op  Start time: 2/5/2025 1:56 PM  End time: 2/5/2025 1:58 PM  Reason for block: at surgeon's request and post-op pain management  Staffing  Performed: attending   Authorized by: Marcelino Jefferson DO    Performed by: Marcelino Jefferson DO  Preanesthetic Checklist  Completed: patient identified, IV checked, site marked, risks and benefits discussed, surgical consent, monitors and equipment checked, pre-op evaluation and timeout performed   Timeout performed at: 2/5/2025 1:51 PM  Peripheral Block  Patient position: sitting  Prep: ChloraPrep  Patient monitoring: heart rate, cardiac monitor and continuous pulse ox  Block type: interscalene  Laterality: left  Injection technique: single-shot  Guidance: ultrasound guided  Local infiltration: ropivacaine  Infiltration strength: 0.5 %  Dose: 15 mL  Needle  Needle gauge: 22 G  Needle length: 5 cm  Needle localization: ultrasound guidance  Assessment  Injection assessment: negative aspiration for heme, no paresthesia on injection, incremental injection and local visualized surrounding nerve on ultrasound  Paresthesia pain: none  Heart rate change: no  Slow fractionated injection: yes  Additional Notes  With 4mg Decadron

## 2025-02-05 NOTE — CARE PLAN
The patient's goals for the shift include Pain control    The clinical goals for the shift include Pain control    Over the shift, the patient did  make progress toward the following goals.   Patients' pain is 0/10 at this time.  Problem: Diabetes  Goal: Achieve decreasing blood glucose levels by end of shift  Outcome: Progressing     Problem: Diabetes  Goal: Increase stability of blood glucose readings by end of shift  Outcome: Progressing     Problem: Diabetes  Goal: Decrease in ketones present in urine by end of shift  Outcome: Progressing     Problem: Diabetes  Goal: Maintain electrolyte levels within acceptable range throughout shift  Outcome: Progressing     Problem: Diabetes  Goal: Maintain glucose levels >70mg/dl to <250mg/dl throughout shift  Outcome: Progressing     Problem: Diabetes  Goal: No changes in neurological exam by end of shift  Outcome: Progressing     Problem: Diabetes  Goal: Learn about and adhere to nutrition recommendations by end of shift  Outcome: Progressing     Problem: Diabetes  Goal: Vital signs within normal range for age by end of shift  Outcome: Progressing     Problem: Diabetes  Goal: Increase self care and/or family involovement by end of shift  Outcome: Progressing     Problem: Pain  Goal: Takes deep breaths with improved pain control throughout the shift  2/5/2025 1836 by Ariella Gayle RN  Outcome: Progressing  2/5/2025 1833 by Ariella Gayle RN  Outcome: Progressing     Problem: Pain  Goal: Turns in bed with improved pain control throughout the shift  2/5/2025 1836 by Ariella Gayle RN  Outcome: Progressing  2/5/2025 1833 by Ariella Gayle RN  Outcome: Progressing     Problem: Pain  Goal: Walks with improved pain control throughout the shift  2/5/2025 1836 by Ariella Gayle RN  Outcome: Progressing  2/5/2025 1833 by Ariella Gayle RN  Outcome: Progressing     Problem: Pain  Goal: Performs ADL's with improved pain control throughout shift  2/5/2025 1836 by Ariella Gayle RN  Outcome:  Progressing  2/5/2025 1833 by Ariella Gayle RN  Outcome: Progressing

## 2025-02-05 NOTE — ANESTHESIA POSTPROCEDURE EVALUATION
Patient: Edda Webb    Procedure Summary       Date: 02/05/25 Room / Location: [facility] / [facility]    Anesthesia Start: 1451 Anesthesia Stop: 1641    Procedure: ARTHROPLASTY, SHOULDER, TOTAL, REVERSE & BICEP TENODESIS (Left: Shoulder) Diagnosis:       Displaced fracture of glenoid cavity of scapula, left shoulder, initial encounter for closed fracture      (Displaced fracture of glenoid cavity of scapula, left shoulder, initial encounter for closed fracture [S42.142A])    Surgeons: Rome Bear MD Responsible Provider: Angela Valderrama MD    Anesthesia Type: general, regional ASA Status: 3            Anesthesia Type: general, regional    Vitals Value Taken Time   /84 02/05/25 1637   Temp 36.2 °C (97.2 °F) 02/05/25 1637   Pulse 76 02/05/25 1637   Resp 18 02/05/25 1637   SpO2 92 % 02/05/25 1637       Anesthesia Post Evaluation    Patient location during evaluation: bedside  Patient participation: complete - patient participated  Level of consciousness: awake and alert  Pain management: adequate  Multimodal analgesia pain management approach  Airway patency: patent  Cardiovascular status: acceptable  Respiratory status: acceptable  Hydration status: acceptable  Postoperative Nausea and Vomiting: none        No notable events documented.

## 2025-02-05 NOTE — CONSULTS
Consults    Reason For Consult  Medical management for history of asthma colitis coronary artery disease GERD hyperlipidemia hypertension and postop pain    History Of Present Illness  Edda Wbeb is a 75 y.o. female presenting with left total shoulder replacement.     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), Hyperlipidemia, Hyperlipidemia, Hypertension, Hypertension, Neuropathy, Other abnormal glucose (08/08/2016), Other conditions influencing health status, Pacemaker, Pain in right shoulder (02/02/2016), Pain in right shoulder (02/02/2016), Pathological fracture due to osteoporosis (01/10/2019), Peripheral neuropathy, Personal history of diseases of the skin and subcutaneous tissue (08/08/2016), Personal history of other diseases of the nervous system and sense organs (02/05/2016), Personal history of other specified conditions (08/08/2016), Pneumonia, PONV (postoperative nausea and vomiting), Primary stabbing headache (08/08/2016), Shortness of breath, Syncope, Type 2 diabetes mellitus, and Urinary tract infection.    She has no past medical history of Refusal of blood product.    Surgical History  She has a past surgical history that includes Hysterectomy (02/02/2016); pacemaker placement (Left); Laparoscopic Nissen fundoplication; Wrist fracture surgery (Left); Appendectomy; Breast cyst excision (Left); Cardiac catheterization; Total shoulder arthroplasty (Right, 11/15/2023); and Insert / replace / remove pacemaker.     Social History  She reports that she has quit smoking. Her smoking use included cigarettes. She has a 43 pack-year  smoking history. She has never been exposed to tobacco smoke. She has never used smokeless tobacco. She reports current alcohol use of about 17.0 standard drinks of alcohol per week. She reports that she does not use drugs.    Family History  Family History   Problem Relation Name Age of Onset    Heart disease Mother      Hypertension Mother      Other (cardiac disorder) Mother      Diabetes Mother      Lung cancer Father      Breast cancer Sister Karli     Hypertension Sister Karli     Diabetes Sister Karli     Cancer Sister Karli     Breast cancer Sister Lu     Breast cancer Sister Savana     Diabetes Sister Qi     Cancer Sister Erica         lung    Lung cancer Brother Wilmer     Lung cancer Brother Jackson     Cancer Brother Jackson     Breast cancer Mother's Sister          Allergies  Codeine    Review of Systems  10 system review none contributory  Physical Exam  Patient is alert in no acute distress  Lungs are clear to auscultation and percussion  Cardiac is regular rate and rhythm normal S1-S2 without murmur gallop rub click S3 or S4  Abdomen is soft nontender positive bowel sounds there is no hepatosplenomegaly or CVA tenderness appreciated  Extremities without cyanosis clubbing erythema or edema  Left shoulder surgical area clean and dry no evidence for infection arm is in sling patient can move fingers extremity warm pulses intact  Last Recorded Vitals  BP (!) 144/92 (BP Location: Right arm, Patient Position: Lying)   Pulse 78   Temp 36.8 °C (98.2 °F) (Temporal)   Resp 18   Wt 81.9 kg (180 lb 8.9 oz)   SpO2 95%     Relevant Results  Scheduled medications  acetaminophen, 650 mg, oral, q6h DAVID  [START ON 2/6/2025] aspirin, 81 mg, oral, Daily  atorvastatin, 10 mg, oral, Nightly  ceFAZolin, 1 g, intravenous, q8h  docusate sodium, 100 mg, oral, BID  gabapentin, 600 mg, oral, TID  [START ON 2/6/2025] insulin regular, 0-5 Units, subcutaneous, TID AC  [START ON 2/6/2025] lisinopril, 10 mg, oral,  Daily  metFORMIN, 500 mg, oral, Daily with evening meal  [START ON 2/6/2025] pantoprazole, 40 mg, oral, Daily before breakfast  [START ON 2/6/2025] polyethylene glycol, 17 g, oral, Daily      Continuous medications  lactated Ringer's, 100 mL/hr  oxygen, 2 L/min      PRN medications  PRN medications: ondansetron ODT **OR** ondansetron, oxyCODONE-acetaminophen    Results for orders placed or performed during the hospital encounter of 02/05/25 (from the past 24 hours)   POCT GLUCOSE   Result Value Ref Range    POCT Glucose 111 (H) 74 - 99 mg/dL   POCT GLUCOSE   Result Value Ref Range    POCT Glucose 119 (H) 74 - 99 mg/dL     *Note: Due to a large number of results and/or encounters for the requested time period, some results have not been displayed. A complete set of results can be found in Results Review.          Assessment/Plan   Status post left total shoulder replacement  - Management.  Per Ortho  - PT  - Pain control  - Supportive care    GERD  - Continue PPI    - Coronary artery disease  - Continue current medications  - Patient is stable    Hypertension  - Continue current medications    Type 2 diabetes  Continue current medications  Sliding scale    DVT prophylaxis  - SCDs  - Ambulate      Sharon Garcia MD

## 2025-02-05 NOTE — ANESTHESIA PROCEDURE NOTES
Airway  Date/Time: 2/5/2025 3:02 PM  Urgency: elective    Airway not difficult    Staffing  Performed: CRNA   Authorized by: Angela Valderrama MD    Performed by: YRIS Farmer-RADHA  Patient location during procedure: OR    Indications and Patient Condition  Indications for airway management: anesthesia  Spontaneous Ventilation: absent  Sedation level: deep  Preoxygenated: yes  Patient position: sniffing  Mask difficulty assessment: 1 - vent by mask    Final Airway Details  Final airway type: endotracheal airway      Successful airway: ETT  Cuffed: yes   Successful intubation technique: direct laryngoscopy  Facilitating devices/methods: intubating stylet  Endotracheal tube insertion site: oral  Blade: Verna  Blade size: #3  ETT size (mm): 7.0  Cormack-Lehane Classification: grade I - full view of glottis  Placement verified by: chest auscultation and capnometry   Measured from: lips  ETT to lips (cm): 22  Number of attempts at approach: 1    Additional Comments  Atraumatic, dentition and soft tissue as preop following intubation, neck remained neutral throughout.

## 2025-02-06 VITALS
DIASTOLIC BLOOD PRESSURE: 87 MMHG | BODY MASS INDEX: 27.36 KG/M2 | OXYGEN SATURATION: 97 % | HEART RATE: 80 BPM | SYSTOLIC BLOOD PRESSURE: 147 MMHG | TEMPERATURE: 97.5 F | WEIGHT: 180.56 LBS | RESPIRATION RATE: 18 BRPM | HEIGHT: 68 IN

## 2025-02-06 DIAGNOSIS — M51.369 DEGENERATION OF INTERVERTEBRAL DISC OF LUMBAR REGION, UNSPECIFIED WHETHER PAIN PRESENT: Primary | ICD-10-CM

## 2025-02-06 LAB
ANION GAP SERPL CALC-SCNC: 13 MMOL/L (ref 10–20)
BUN SERPL-MCNC: 18 MG/DL (ref 6–23)
CALCIUM SERPL-MCNC: 9 MG/DL (ref 8.6–10.3)
CHLORIDE SERPL-SCNC: 104 MMOL/L (ref 98–107)
CO2 SERPL-SCNC: 25 MMOL/L (ref 21–32)
CREAT SERPL-MCNC: 0.78 MG/DL (ref 0.5–1.05)
EGFRCR SERPLBLD CKD-EPI 2021: 80 ML/MIN/1.73M*2
ERYTHROCYTE [DISTWIDTH] IN BLOOD BY AUTOMATED COUNT: 13.4 % (ref 11.5–14.5)
GLUCOSE BLD MANUAL STRIP-MCNC: 103 MG/DL (ref 74–99)
GLUCOSE SERPL-MCNC: 125 MG/DL (ref 74–99)
HCT VFR BLD AUTO: 36.2 % (ref 36–46)
HGB BLD-MCNC: 11.8 G/DL (ref 12–16)
MCH RBC QN AUTO: 29.1 PG (ref 26–34)
MCHC RBC AUTO-ENTMCNC: 32.6 G/DL (ref 32–36)
MCV RBC AUTO: 89 FL (ref 80–100)
NRBC BLD-RTO: ABNORMAL /100{WBCS}
PLATELET # BLD AUTO: 322 X10*3/UL (ref 150–450)
POTASSIUM SERPL-SCNC: 4.3 MMOL/L (ref 3.5–5.3)
RBC # BLD AUTO: 4.05 X10*6/UL (ref 4–5.2)
SODIUM SERPL-SCNC: 138 MMOL/L (ref 136–145)
WBC # BLD AUTO: 13.9 X10*3/UL (ref 4.4–11.3)

## 2025-02-06 PROCEDURE — 80048 BASIC METABOLIC PNL TOTAL CA: CPT | Performed by: PHYSICIAN ASSISTANT

## 2025-02-06 PROCEDURE — 85027 COMPLETE CBC AUTOMATED: CPT | Performed by: PHYSICIAN ASSISTANT

## 2025-02-06 PROCEDURE — 2500000005 HC RX 250 GENERAL PHARMACY W/O HCPCS: Performed by: STUDENT IN AN ORGANIZED HEALTH CARE EDUCATION/TRAINING PROGRAM

## 2025-02-06 PROCEDURE — 2500000004 HC RX 250 GENERAL PHARMACY W/ HCPCS (ALT 636 FOR OP/ED): Performed by: PHYSICIAN ASSISTANT

## 2025-02-06 PROCEDURE — 36415 COLL VENOUS BLD VENIPUNCTURE: CPT | Performed by: PHYSICIAN ASSISTANT

## 2025-02-06 PROCEDURE — 2500000004 HC RX 250 GENERAL PHARMACY W/ HCPCS (ALT 636 FOR OP/ED): Performed by: EMERGENCY MEDICINE

## 2025-02-06 PROCEDURE — 7100000011 HC EXTENDED STAY RECOVERY HOURLY - NURSING UNIT

## 2025-02-06 PROCEDURE — 97116 GAIT TRAINING THERAPY: CPT | Mod: GP

## 2025-02-06 PROCEDURE — 82947 ASSAY GLUCOSE BLOOD QUANT: CPT

## 2025-02-06 PROCEDURE — 97161 PT EVAL LOW COMPLEX 20 MIN: CPT | Mod: GP

## 2025-02-06 PROCEDURE — 99221 1ST HOSP IP/OBS SF/LOW 40: CPT | Performed by: EMERGENCY MEDICINE

## 2025-02-06 PROCEDURE — 2500000001 HC RX 250 WO HCPCS SELF ADMINISTERED DRUGS (ALT 637 FOR MEDICARE OP): Performed by: PHYSICIAN ASSISTANT

## 2025-02-06 PROCEDURE — 97110 THERAPEUTIC EXERCISES: CPT | Mod: GP

## 2025-02-06 RX ORDER — DOXYCYCLINE 100 MG/1
100 CAPSULE ORAL 2 TIMES DAILY
Qty: 28 CAPSULE | Refills: 0 | Status: SHIPPED | OUTPATIENT
Start: 2025-02-06 | End: 2025-02-20

## 2025-02-06 RX ORDER — OXYCODONE AND ACETAMINOPHEN 5; 325 MG/1; MG/1
1 TABLET ORAL EVERY 6 HOURS PRN
Qty: 28 TABLET | Refills: 0 | Status: SHIPPED | OUTPATIENT
Start: 2025-02-06 | End: 2025-02-13

## 2025-02-06 RX ORDER — KETOROLAC TROMETHAMINE 15 MG/ML
15 INJECTION, SOLUTION INTRAMUSCULAR; INTRAVENOUS ONCE
Status: COMPLETED | OUTPATIENT
Start: 2025-02-06 | End: 2025-02-06

## 2025-02-06 RX ADMIN — OXYCODONE HYDROCHLORIDE AND ACETAMINOPHEN 1 TABLET: 5; 325 TABLET ORAL at 13:14

## 2025-02-06 RX ADMIN — ASPIRIN 81 MG: 81 TABLET, COATED ORAL at 09:01

## 2025-02-06 RX ADMIN — ACETAMINOPHEN 650 MG: 325 TABLET ORAL at 12:31

## 2025-02-06 RX ADMIN — PANTOPRAZOLE SODIUM 40 MG: 40 TABLET, DELAYED RELEASE ORAL at 06:36

## 2025-02-06 RX ADMIN — CEFAZOLIN SODIUM 1 G: 1 SOLUTION INTRAVENOUS at 06:38

## 2025-02-06 RX ADMIN — KETOROLAC TROMETHAMINE 15 MG: 15 INJECTION, SOLUTION INTRAMUSCULAR; INTRAVENOUS at 11:15

## 2025-02-06 RX ADMIN — DOCUSATE SODIUM 100 MG: 100 CAPSULE, LIQUID FILLED ORAL at 09:01

## 2025-02-06 RX ADMIN — SODIUM CHLORIDE, SODIUM LACTATE, POTASSIUM CHLORIDE, AND CALCIUM CHLORIDE 100 ML/HR: 600; 310; 30; 20 INJECTION, SOLUTION INTRAVENOUS at 03:16

## 2025-02-06 RX ADMIN — Medication 3 MG: at 00:03

## 2025-02-06 RX ADMIN — OXYCODONE HYDROCHLORIDE AND ACETAMINOPHEN 1 TABLET: 5; 325 TABLET ORAL at 06:36

## 2025-02-06 RX ADMIN — POLYETHYLENE GLYCOL 3350 17 G: 17 POWDER, FOR SOLUTION ORAL at 09:01

## 2025-02-06 RX ADMIN — ACETAMINOPHEN 650 MG: 325 TABLET ORAL at 06:36

## 2025-02-06 RX ADMIN — LISINOPRIL 10 MG: 10 TABLET ORAL at 09:01

## 2025-02-06 RX ADMIN — GABAPENTIN 600 MG: 300 CAPSULE ORAL at 09:01

## 2025-02-06 ASSESSMENT — PAIN SCALES - GENERAL
PAINLEVEL_OUTOF10: 6
PAINLEVEL_OUTOF10: 7
PAINLEVEL_OUTOF10: 5 - MODERATE PAIN
PAINLEVEL_OUTOF10: 9
PAINLEVEL_OUTOF10: 7
PAINLEVEL_OUTOF10: 0 - NO PAIN
PAINLEVEL_OUTOF10: 3

## 2025-02-06 ASSESSMENT — COGNITIVE AND FUNCTIONAL STATUS - GENERAL
PERSONAL GROOMING: A LITTLE
MOVING TO AND FROM BED TO CHAIR: A LITTLE
TOILETING: A LITTLE
MOBILITY SCORE: 18
MOBILITY SCORE: 18
MOVING FROM LYING ON BACK TO SITTING ON SIDE OF FLAT BED WITH BEDRAILS: A LITTLE
TURNING FROM BACK TO SIDE WHILE IN FLAT BAD: A LITTLE
MOVING TO AND FROM BED TO CHAIR: A LITTLE
STANDING UP FROM CHAIR USING ARMS: A LITTLE
MOVING FROM LYING ON BACK TO SITTING ON SIDE OF FLAT BED WITH BEDRAILS: A LITTLE
WALKING IN HOSPITAL ROOM: A LITTLE
STANDING UP FROM CHAIR USING ARMS: A LITTLE
EATING MEALS: A LITTLE
CLIMB 3 TO 5 STEPS WITH RAILING: A LITTLE
DRESSING REGULAR LOWER BODY CLOTHING: A LOT
TURNING FROM BACK TO SIDE WHILE IN FLAT BAD: A LITTLE
HELP NEEDED FOR BATHING: A LITTLE
DAILY ACTIVITIY SCORE: 16
CLIMB 3 TO 5 STEPS WITH RAILING: A LITTLE
DRESSING REGULAR UPPER BODY CLOTHING: A LOT
WALKING IN HOSPITAL ROOM: A LITTLE

## 2025-02-06 ASSESSMENT — PAIN - FUNCTIONAL ASSESSMENT
PAIN_FUNCTIONAL_ASSESSMENT: 0-10

## 2025-02-06 ASSESSMENT — PAIN DESCRIPTION - DESCRIPTORS
DESCRIPTORS: ACHING
DESCRIPTORS: ACHING

## 2025-02-06 ASSESSMENT — PAIN DESCRIPTION - ORIENTATION
ORIENTATION: LEFT

## 2025-02-06 ASSESSMENT — ACTIVITIES OF DAILY LIVING (ADL)
ADLS_ADDRESSED: YES
LACK_OF_TRANSPORTATION: NO
ADL_ASSISTANCE: INDEPENDENT

## 2025-02-06 ASSESSMENT — PAIN DESCRIPTION - LOCATION
LOCATION: SHOULDER

## 2025-02-06 NOTE — NURSING NOTE
0200pm Pt and daughter given written and verbal discharge instructions with verbalized understanding. Pt's iv heplock removed without difficulty. Pt escorted down via wheelchair, with all personal belongings accompanying. Pt advised to  meds from home pharmacy.

## 2025-02-06 NOTE — CARE PLAN
Problem: Diabetes  Goal: Achieve decreasing blood glucose levels by end of shift  Outcome: Progressing  Goal: Increase stability of blood glucose readings by end of shift  Outcome: Progressing  Goal: Maintain electrolyte levels within acceptable range throughout shift  Outcome: Progressing  Goal: Maintain glucose levels >70mg/dl to <250mg/dl throughout shift  Outcome: Progressing  Goal: No changes in neurological exam by end of shift  Outcome: Progressing  Goal: Learn about and adhere to nutrition recommendations by end of shift  Outcome: Progressing  Goal: Vital signs within normal range for age by end of shift  Outcome: Progressing  Goal: Increase self care and/or family involovement by end of shift  Outcome: Progressing  Goal: Receive DSME education by end of shift  Outcome: Progressing     Problem: Pain  Goal: Takes deep breaths with improved pain control throughout the shift  Outcome: Progressing  Goal: Turns in bed with improved pain control throughout the shift  Outcome: Progressing  Goal: Walks with improved pain control throughout the shift  Outcome: Progressing  Goal: Performs ADL's with improved pain control throughout shift  Outcome: Progressing  Goal: Participates in PT with improved pain control throughout the shift  Outcome: Progressing  Goal: Free from opioid side effects throughout the shift  Outcome: Progressing  Goal: Free from acute confusion related to pain meds throughout the shift  Outcome: Progressing     Problem: Deep Vein Thrombosis  Goal: I will remain free from complications of deep vein thrombosis and maintain current level of mobility  Outcome: Progressing     Problem: Pain - Adult  Goal: Verbalizes/displays adequate comfort level or baseline comfort level  Outcome: Progressing     Problem: Safety - Adult  Goal: Free from fall injury  Outcome: Progressing     Problem: Discharge Planning  Goal: Discharge to home or other facility with appropriate resources  Outcome: Progressing      Problem: Chronic Conditions and Co-morbidities  Goal: Patient's chronic conditions and co-morbidity symptoms are monitored and maintained or improved  Outcome: Progressing     Problem: Nutrition  Goal: Nutrient intake appropriate for maintaining nutritional needs  Outcome: Progressing     Problem: Fall/Injury  Goal: Not fall by end of shift  Outcome: Progressing  Goal: Be free from injury by end of the shift  Outcome: Progressing  Goal: Verbalize understanding of personal risk factors for fall in the hospital  Outcome: Progressing  Goal: Verbalize understanding of risk factor reduction measures to prevent injury from fall in the home  Outcome: Progressing  Goal: Use assistive devices by end of the shift  Outcome: Progressing  Goal: Pace activities to prevent fatigue by end of the shift  Outcome: Progressing   The patient's goals for the shift include Pain control    The clinical goals for the shift include pain control, safety

## 2025-02-06 NOTE — PROGRESS NOTES
Patient seen, chart reviewed.  Pain is well-controlled.  Blood glucose is acceptable.  Vital signs are stable.  No acute issues.         Gloria Esparza MD

## 2025-02-06 NOTE — CARE PLAN
The patient's goals for the shift include Pain control    The clinical goals for the shift include pain control, safety

## 2025-02-06 NOTE — PROGRESS NOTES
75 yr old female admitted extended recovery following left total shoulder arthroplasty with Dr. Bear.   Plan is home today with no skilled needs.  Post op follow up on Tuesday May 20, 2025 10:00 AM-Darius  Spouse to transport home and assist with care as needed.    02/06/25 0853   Discharge Planning   Living Arrangements Spouse/significant other   Support Systems Spouse/significant other   Assistance Needed transportation   Type of Residence Private residence   Number of Stairs to Enter Residence 2   Number of Stairs Within Residence 12   Do you have animals or pets at home? Yes   Type of Animals or Pets 1 cat   Home or Post Acute Services None   Expected Discharge Disposition Home   Does the patient need discharge transport arranged? No   Financial Resource Strain   How hard is it for you to pay for the very basics like food, housing, medical care, and heating? Not hard   Housing Stability   In the last 12 months, was there a time when you were not able to pay the mortgage or rent on time? N   In the past 12 months, how many times have you moved where you were living? 0   At any time in the past 12 months, were you homeless or living in a shelter (including now)? N   Transportation Needs   In the past 12 months, has lack of transportation kept you from medical appointments or from getting medications? no   In the past 12 months, has lack of transportation kept you from meetings, work, or from getting things needed for daily living? No   Patient Choice   Provider Choice list and CMS website (https://medicare.gov/care-compare#search) for post-acute Quality and Resource Measure Data were provided and reviewed with: Patient   Patient / Family choosing to utilize agency / facility established prior to hospitalization No   Stroke Family Assessment   Stroke Family Assessment Needed No

## 2025-02-06 NOTE — PROGRESS NOTES
Physical Therapy Evaluation & Treatment    Patient Name: Edda Webb  MRN: [id number]  Department:   Room: [address] [place] [address]  Today's Date: 2/6/2025   Time Calculation  Start Time: 1056  Stop Time: 1125  Time Calculation (min): 29 min    Assessment/Plan   PT Assessment  PT Assessment Results: Decreased strength, Decreased range of motion, Decreased endurance, Impaired balance, Orthopedic restrictions, Pain  Rehab Prognosis: Good  Barriers to Discharge Home: No anticipated barriers  Evaluation/Treatment Tolerance: Patient tolerated treatment well  End of Session Communication: Bedside nurse  Assessment Comment: Pt presents with impaired functional mobility s/p L RTSR. Recommend discharge home with 24 hour supervision/intermittent assistance and outpatient PT. Pt was issued HEP handout. Pt verbalized and demonstrated understanding.   End of Session Patient Position: Up in chair, BLE elevated, ice to surgical site, call light in reach, needs met, RN aware.  IP OR SWING BED PT PLAN  Inpatient or Swing Bed: Inpatient  PT Plan  Treatment/Interventions: Bed mobility, Transfer training, Gait training, Stair training, Balance training, Strengthening, Endurance training, Range of motion, Therapeutic exercise, Therapeutic activity, Home exercise program, Positioning  PT Plan: Ongoing PT  PT Frequency: 5 times per week  PT Discharge Recommendations: Low intensity level of continued care  Equipment Recommended upon Discharge: Straight cane  PT Recommended Transfer Status: Contact guard, Assistive device  PT - OK to Discharge: Yes      Subjective     General Visit Information:  General  Reason for Referral: L reverse total shoulder replacement secondary to glenoid fracture s/p fall on 1/31/25.  Referred By: Dr. Bear  Past Medical History Relevant to Rehab: CAD, HTN, syncope, pacemaker, GERD, HTN, COPD, HLD, neuropathy, hysterectomy, appendectomy, R TSR  Family/Caregiver Present: No  Prior to Session Communication: Bedside  nurse  Patient Position Received: Up in chair  General Comment: .  Home Living:  Home Living  Type of Home: House  Lives With: Spouse  Home Adaptive Equipment: None  Home Layout: One level  Home Access: Stairs to enter with rails  Entrance Stairs-Rails: Right  Entrance Stairs-Number of Steps: 2  Prior Level of Function:  Prior Function Per Pt/Caregiver Report  Level of [place]: Independent with ADLs and functional transfers, Independent with homemaking with ambulation  ADL Assistance: Independent  Homemaking Assistance: Independent  Ambulatory Assistance: Independent  Vocational: Retired  Prior Function Comments: Pt reports only the fall that resulted in this surgery prior to admission.  Precautions:  Precautions  UE Weight Bearing Status: Left Non-Weight Bearing  LE Weight Bearing Status: Weight Bearing as Tolerated  Medical Precautions: Fall precautions  Post-Surgical Precautions: Left shoulder precautions           Objective   Pain:  Pain Assessment  Pain Assessment: 0-10  0-10 (Numeric) Pain Score: 9  Pain Type: Surgical pain  Pain Location: Shoulder  Pain Orientation: Left  Pain Interventions: Cold applied, Repositioned, Medication (See MAR)  Cognition:  Cognition  Overall Cognitive Status: Within Functional Limits  Orientation Level: Oriented X4  Attention: Within Functional Limits  Memory: Within Funtional Limits  Problem Solving: Within Functional Limits  Numeric Reasoning: Within Functional Limits  Abstract Reasoning: Within Functional Limits  Safety/Judgement: Within Functional Limits  Insight: Within function limits    General Assessments:  Activity Tolerance  Endurance: Tolerates 30+ min exercise without fatigue    Sensation  Light Touch: No apparent deficits    Coordination  Movements are Fluid and Coordinated: Yes    Postural Control  Postural Control: Within Functional Limits    Static Sitting Balance  Static Sitting-Balance Support: Feet supported  Static Sitting-Level of Assistance:  Independent  Dynamic Sitting Balance  Dynamic Sitting-Balance Support: Feet supported  Dynamic Sitting-Level of Assistance: Independent    Static Standing Balance  Static Standing-Balance Support: No upper extremity supported  Static Standing-Level of Assistance: Close supervision  Dynamic Standing Balance  Dynamic Standing-Balance Support: No upper extremity supported  Dynamic Standing-Level of Assistance: Close supervision  Functional Assessments:  ADL  ADL's Addressed: Yes  UE Dressing Assistance: Minimal  UE Dressing Deficit: Verbal cueing, Setup, Thread LUE  LE Dressing Assistance: Minimal  LE Dressing Deficit: Setup, Thread LLE into underwear, Thread RLE into underwear, Thread LLE into pants, Thread RLE into pants         Transfers  Transfer: Yes  Transfer 1  Transfer From 1: Chair with arms to  Transfer to 1: Chair with arms  Technique 1: Sit to stand, Stand to sit  Transfer Level of Assistance 1: Close supervision  Trials/Comments 1: ''    Ambulation/Gait Training  Ambulation/Gait Training Performed: Yes  Ambulation/Gait Training 1  Surface 1: Level tile  Device 1: No device  Assistance 1: Minimum assistance, Hand held assistance, Close supervision (min A via HHA progressing to )  Quality of Gait 1: Decreased step length (decreased renita, no LOB noted.)  Comments/Distance (ft) 1: 100 feet x 1  Extremity/Trunk Assessments:  RUE   RUE : Within Functional Limits  LUE   LUE: Exceptions to WFL. shoulder ROM/strength limited due to post-op pain and surgeon restrictions. ,  RLE   RLE : Within Functional Limits  LLE   LLE : Within Functional Limits  Treatments:  Therapeutic Exercise  Therapeutic Exercise Performed: Yes  LUE assisted elbow flexion/extension, wrist pronation/supination, wrist flexion/extension, and hand opening/closing x 10 reps.    Outcome Measures:  [facility] Basic Mobility  Turning from your back to your side while in a flat bed without using bedrails: A little  Moving from lying on your back to  sitting on the side of a flat bed without using bedrails: A little  Moving to and from bed to chair (including a wheelchair): A little  Standing up from a chair using your arms (e.g. wheelchair or bedside chair): A little  To walk in hospital room: A little  Climbing 3-5 steps with railing: A little  Basic Mobility - Total Score: 18    Opal Dc, PT, DPT

## 2025-02-06 NOTE — PROGRESS NOTES
"Edda Webb is a 75 y.o. female on day 0 of admission presenting with Displaced fracture of glenoid cavity of scapula, left shoulder, initial encounter for closed fracture.    Subjective   Doing well pain controlled, postoperative x-rays show good alignment but I did discuss with the patient with her fracture and small glenoid she is at risk for failure and we will have to go slow in terms of range of motion we discussed this at length.  She has been through replacement on the opposite side we will write that in her orders.       Objective     Physical Exam  Wound looks excellent today no large hematoma motor and sensation coming back nicely.  I did discuss with her that with her dislocation we will watch for any other nerve injury that was not initially apparent.  Last Recorded Vitals  Blood pressure 147/87, pulse 80, temperature 36.4 °C (97.5 °F), temperature source Temporal, resp. rate 18, height 1.727 m (5' 8\"), weight 81.9 kg (180 lb 8.9 oz), SpO2 97%.  Intake/Output last 3 Shifts:  I/O last 3 completed shifts:  In: 1978.3 (24.2 mL/kg) [I.V.:1928.3 (23.5 mL/kg); IV Piggyback:50]  Out: 1650 (20.1 mL/kg) [Urine:1600 (0.5 mL/kg/hr); Blood:50]  Weight: 81.9 kg     Relevant Results      Scheduled medications  acetaminophen, 650 mg, oral, q6h DAVID  aspirin, 81 mg, oral, Daily  atorvastatin, 10 mg, oral, Nightly  docusate sodium, 100 mg, oral, BID  gabapentin, 600 mg, oral, TID  insulin regular, 0-5 Units, subcutaneous, TID AC  lisinopril, 10 mg, oral, Daily  metFORMIN, 500 mg, oral, Daily with evening meal  pantoprazole, 40 mg, oral, Daily before breakfast  polyethylene glycol, 17 g, oral, Daily      Continuous medications  lactated Ringer's, 100 mL/hr, Last Rate: Stopped (02/06/25 0714)      PRN medications  PRN medications: benzocaine-menthol, calcium carbonate, lubricating eye drops, melatonin, ondansetron ODT **OR** ondansetron, oxyCODONE-acetaminophen, oxygen, simethicone, sodium chloride  Results for orders " placed or performed during the hospital encounter of 02/05/25 (from the past 24 hours)   POCT GLUCOSE   Result Value Ref Range    POCT Glucose 111 (H) 74 - 99 mg/dL   POCT GLUCOSE   Result Value Ref Range    POCT Glucose 119 (H) 74 - 99 mg/dL   POCT GLUCOSE   Result Value Ref Range    POCT Glucose 185 (H) 74 - 99 mg/dL   CBC   Result Value Ref Range    WBC 13.9 (H) 4.4 - 11.3 x10*3/uL    nRBC      RBC 4.05 4.00 - 5.20 x10*6/uL    Hemoglobin 11.8 (L) 12.0 - 16.0 g/dL    Hematocrit 36.2 36.0 - 46.0 %    MCV 89 80 - 100 fL    MCH 29.1 26.0 - 34.0 pg    MCHC 32.6 32.0 - 36.0 g/dL    RDW 13.4 11.5 - 14.5 %    Platelets 322 150 - 450 x10*3/uL   Basic metabolic panel   Result Value Ref Range    Glucose 125 (H) 74 - 99 mg/dL    Sodium 138 136 - 145 mmol/L    Potassium 4.3 3.5 - 5.3 mmol/L    Chloride 104 98 - 107 mmol/L    Bicarbonate 25 21 - 32 mmol/L    Anion Gap 13 10 - 20 mmol/L    Urea Nitrogen 18 6 - 23 mg/dL    Creatinine 0.78 0.50 - 1.05 mg/dL    eGFR 80 >60 mL/min/1.73m*2    Calcium 9.0 8.6 - 10.3 mg/dL   POCT GLUCOSE   Result Value Ref Range    POCT Glucose 103 (H) 74 - 99 mg/dL                            Assessment/Plan   Assessment & Plan  Displaced fracture of glenoid cavity of scapula, left shoulder, initial encounter for closed fracture    Glenoid fracture of shoulder, left, closed, initial encounter    At this juncture she can be discharged home later we will get her out of the pillow portion of the sling but organ to go with simple range of motion as discussed             Rome Bear MD

## 2025-02-06 NOTE — DISCHARGE INSTRUCTIONS
You had a shoulder replacement done yesterday. You had a nerve block prior to surgery that will make the arm numb for 18-24 hours after surgery. You may feel tingling in the hand and arm as the block wears off which is very normal.    Bruising and some swelling into the shoulder, elbow, and chest region is very normal, gravuty will slowly pull this down as well even into the abdomen and lower arm. Any severe swelling or redness please call the office.     Pain medication and an antibiotic will be sent to your pharmacy, if you are ABLE, you can take 800mg Ibuprofen as well or alternate the pain medication and ibuprofen every 4-6 hours.    Once nerve block has worn off, you may remove abduction pillow from sling, you can use the sling with activity and at night but while sitting and laying down ALLOW ELBOW TO STRAIGHTEN OUT OF SLING as much as possible.    Ok to move wrist and fingers fully immediately after surgery, this will help pump fluid and swelling out of the hand and forearm. Do simple shoulder pendulum swings only for the shoulder until your post-op visit    Please call the office at [phone] for a follow up in 10-14 days post-op    done

## 2025-02-06 NOTE — CONSULTS
Consults    Reason For Consult  Medical management for history of asthma colitis coronary artery disease GERD hyperlipidemia hypertension and postop pain  History Of Present Illness  Edda Webb is a 75 y.o. female presenting with left total shoulder replaced.  Patient has done well postoperatively without complications and is medically stable for discharge to home.     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), Hyperlipidemia, Hyperlipidemia, Hypertension, Hypertension, Neuropathy, Other abnormal glucose (08/08/2016), Other conditions influencing health status, Pacemaker, Pain in right shoulder (02/02/2016), Pain in right shoulder (02/02/2016), Pathological fracture due to osteoporosis (01/10/2019), Peripheral neuropathy, Personal history of diseases of the skin and subcutaneous tissue (08/08/2016), Personal history of other diseases of the nervous system and sense organs (02/05/2016), Personal history of other specified conditions (08/08/2016), Pneumonia, PONV (postoperative nausea and vomiting), Primary stabbing headache (08/08/2016), Shortness of breath, Syncope, Type 2 diabetes mellitus, and Urinary tract infection.    She has no past medical history of Refusal of blood product.    Surgical History  She has a past surgical history that includes Hysterectomy (02/02/2016); pacemaker placement (Left); Laparoscopic Nissen fundoplication; Wrist fracture surgery (Left); Appendectomy; Breast cyst excision (Left); Cardiac catheterization; Total shoulder arthroplasty (Right, 11/15/2023); and Insert / replace / remove pacemaker.     Social  History  She reports that she has quit smoking. Her smoking use included cigarettes. She has a 43 pack-year smoking history. She has never been exposed to tobacco smoke. She has never used smokeless tobacco. She reports current alcohol use of about 17.0 standard drinks of alcohol per week. She reports that she does not use drugs.    Family History  Family History   Problem Relation Name Age of Onset    Heart disease Mother      Hypertension Mother      Other (cardiac disorder) Mother      Diabetes Mother      Lung cancer Father      Breast cancer Sister Karli     Hypertension Sister Karli     Diabetes Sister Karli     Cancer Sister Karli     Breast cancer Sister Lu     Breast cancer Sister Savana     Diabetes Sister Qi     Cancer Sister Erica         lung    Lung cancer Brother Wilmer     Lung cancer Brother Jackson     Cancer Brother Jackson     Breast cancer Mother's Sister          Allergies  Codeine    Review of Systems  10 system review noncontributory  Physical Exam  Patient is alert in no acute distress  Lungs are clear to auscultation and percussion  Cardiac is regular rate and rhythm normal S1-S2 without murmur gallop rub click S3 or S4  Abdomen is soft nontender positive bowel sounds there is no hepatosplenomegaly or CVA tenderness appreciated  Extremities without cyanosis clubbing erythema or edema  Left arm in sling operative site clean dry extremity warm pulses are intact patient able to move fingers  Last Recorded Vitals  /87 (BP Location: Right arm, Patient Position: Sitting)   Pulse 80   Temp 36.4 °C (97.5 °F) (Temporal)   Resp 18   Wt 81.9 kg (180 lb 8.9 oz)   SpO2 97%     Relevant Results  Scheduled medications  acetaminophen, 650 mg, oral, q6h DAVID  aspirin, 81 mg, oral, Daily  atorvastatin, 10 mg, oral, Nightly  docusate sodium, 100 mg, oral, BID  gabapentin, 600 mg, oral, TID  insulin regular, 0-5 Units, subcutaneous, TID AC  lisinopril, 10 mg, oral, Daily  metFORMIN, 500  mg, oral, Daily with evening meal  pantoprazole, 40 mg, oral, Daily before breakfast  polyethylene glycol, 17 g, oral, Daily      Continuous medications  lactated Ringer's, 100 mL/hr, Last Rate: Stopped (02/06/25 0714)      PRN medications  PRN medications: benzocaine-menthol, calcium carbonate, lubricating eye drops, melatonin, ondansetron ODT **OR** ondansetron, oxyCODONE-acetaminophen, oxygen, simethicone, sodium chloride    Results for orders placed or performed during the hospital encounter of 02/05/25 (from the past 24 hours)   POCT GLUCOSE   Result Value Ref Range    POCT Glucose 119 (H) 74 - 99 mg/dL   POCT GLUCOSE   Result Value Ref Range    POCT Glucose 185 (H) 74 - 99 mg/dL   CBC   Result Value Ref Range    WBC 13.9 (H) 4.4 - 11.3 x10*3/uL    nRBC      RBC 4.05 4.00 - 5.20 x10*6/uL    Hemoglobin 11.8 (L) 12.0 - 16.0 g/dL    Hematocrit 36.2 36.0 - 46.0 %    MCV 89 80 - 100 fL    MCH 29.1 26.0 - 34.0 pg    MCHC 32.6 32.0 - 36.0 g/dL    RDW 13.4 11.5 - 14.5 %    Platelets 322 150 - 450 x10*3/uL   Basic metabolic panel   Result Value Ref Range    Glucose 125 (H) 74 - 99 mg/dL    Sodium 138 136 - 145 mmol/L    Potassium 4.3 3.5 - 5.3 mmol/L    Chloride 104 98 - 107 mmol/L    Bicarbonate 25 21 - 32 mmol/L    Anion Gap 13 10 - 20 mmol/L    Urea Nitrogen 18 6 - 23 mg/dL    Creatinine 0.78 0.50 - 1.05 mg/dL    eGFR 80 >60 mL/min/1.73m*2    Calcium 9.0 8.6 - 10.3 mg/dL   POCT GLUCOSE   Result Value Ref Range    POCT Glucose 103 (H) 74 - 99 mg/dL     *Note: Due to a large number of results and/or encounters for the requested time period, some results have not been displayed. A complete set of results can be found in Results Review.          Assessment/Plan   Status post left total shoulder replacement  - Management.  Per Ortho  - PT  - Pain control  - Supportive care     GERD  - Continue PPI     - Coronary artery disease  - Continue current medications  - Patient is stable     Hypertension  - Continue current  medications     Type 2 diabetes  Continue current medications  Sliding scale     DVT prophylaxis  - SCDs  - Ambulate    Patient is medically and clinically stable for discharge to home         Sharon Garcia MD

## 2025-02-06 NOTE — NURSING NOTE
Pt. Ambulated to the chair.  Encouraged pt. To order breakfast.  Call light within reach.  Chair alarm on.

## 2025-02-06 NOTE — NURSING NOTE
Pt. requested to have pulse ox rechecked. Reading is 95%, RA. She denies any chest pain or SOB, but was concerned d/t it reading 93% when VS were last checked. She continues to deny any pain to the L shoulder. Surgical incision is dry and intact, sealed by skin adhesive. Assessment as charted, VSS. Incentive spirometry encouraged. Any questions/concerns were addressed. Call light is within reach, bed alarm activated.

## 2025-02-06 NOTE — NURSING NOTE
Assumed care of pt. She's awake in bed, watching TV. She reports no current c/o pain or discomfort. She denies needs at this time. Will return to complete assessment. VSS.

## 2025-02-06 NOTE — NURSING NOTE
0745am Assumed care of pt, A/Ox 4 denies any c/o pains, no distress noted. Pt has left shoulder incision ROBERT with dermabond and adaptic, with ice pack in place and sling. Pt sitting up in recliner eating breakfast with call light in reach.

## 2025-02-06 NOTE — DISCHARGE SUMMARY
Discharge Diagnosis  Displaced fracture of glenoid cavity of scapula, left shoulder, initial encounter for closed fracture    Issues Requiring Follow-Up  2 week post-op    Test Results Pending At Discharge  Pending Labs       No current pending labs.            Hospital Course   No issues    Pertinent Physical Exam At Time of Discharge  Physical Exam  Wound is clean and dry, no early signs of infection or hematoma. Axillary nerve sensation intact. Neurovascularly intact distally    Home Medications     Medication List      START taking these medications     doxycycline 100 mg capsule; Commonly known as: Vibramycin; Take 1   capsule (100 mg) by mouth 2 times a day for 14 days. Take with at least 8   ounces (large glass) of water, do not lie down for 30 minutes after   oxyCODONE-acetaminophen 5-325 mg tablet; Commonly known as: Percocet;   Take 1 tablet by mouth every 6 hours if needed for severe pain (7 - 10)   for up to 7 days.     CONTINUE taking these medications     ammonium lactate 12 % lotion; Commonly known as: Lac-Hydrin   aspirin 81 mg EC tablet   CALCIUM 600 + D(3) ORAL   CENTRUM ADULT 50 PLUS ORAL   cyclobenzaprine 5 mg tablet; Commonly known as: Flexeril; Take 1 tablet   (5 mg) by mouth 3 times a day as needed for muscle spasms for up to 5   days.   gabapentin 600 mg tablet; Commonly known as: Neurontin; Take 1 tablet   (600 mg) by mouth 3 times a day.   glucosamine-chondroitin 500-400 mg tablet   lisinopril 10 mg tablet; Take 1 tablet (10 mg) by mouth once daily.   loratadine 10 mg tablet; Commonly known as: Claritin; Take 1 tablet (10   mg) by mouth once daily.   metFORMIN  mg 24 hr tablet; Commonly known as: Glucophage-XR; Take   1 tablet (500 mg) by mouth once daily in the evening. Take with meals.   oxyCODONE 5 mg immediate release tablet; Commonly known as: Roxicodone;   Take 1 tablet (5 mg) by mouth every 6 hours if needed for severe pain (7 -   10).   pantoprazole 40 mg EC tablet; Commonly  known as: ProtoNix; Take 1 tablet   (40 mg) by mouth once daily in the morning. Take before meals.   PAPAYA ENZYME ORAL   simvastatin 20 mg tablet; Commonly known as: Zocor; Take 1 tablet (20   mg) by mouth once daily at bedtime.   traMADol 50 mg tablet; Commonly known as: Ultram   triamcinolone 0.1 % cream; Commonly known as: Kenalog; Apply 1   Application topically 2 times a day as needed for irritation. Apply to   affected area     STOP taking these medications     ibuprofen 600 mg tablet   predniSONE 10 mg tablet; Commonly known as: Deltasone     ASK your doctor about these medications     acetaminophen 650 mg ER tablet; Commonly known as: Tylenol 8 HOUR   Fish OiL 1,000 (120-180) mg capsule; Generic drug: omega 3-dha-epa-fish   oil   Proventil HFA 90 mcg/actuation inhaler; Generic drug: albuterol       Outpatient Follow-Up  Future Appointments   Date Time Provider Department Center   2/11/2025  9:00 AM Willam Alonso PT [id number] [facility]   5/20/2025 10:00 AM Rome Bear MD [id number] [facility]   7/2/2025 11:15 AM Lui Lock DO [id number] [facility]   2/2/2026 10:00 AM Bandar Valdes MD [id number] [facility]       Dbei Stover PA-C

## 2025-02-07 ENCOUNTER — TELEPHONE (OUTPATIENT)
Dept: ORTHOPEDIC SURGERY | Facility: HOSPITAL | Age: 75
End: 2025-02-07
Payer: MEDICARE

## 2025-02-07 NOTE — TELEPHONE ENCOUNTER
Patient is asking if she needs to schedule therapy right now or wait till she sees you on her 2 week post op.

## 2025-02-11 ENCOUNTER — APPOINTMENT (OUTPATIENT)
Dept: PHYSICAL THERAPY | Facility: HOSPITAL | Age: 75
End: 2025-02-11
Payer: MEDICARE

## 2025-02-11 ENCOUNTER — APPOINTMENT (OUTPATIENT)
Dept: CARDIOLOGY | Facility: HOSPITAL | Age: 75
End: 2025-02-11
Payer: MEDICARE

## 2025-02-12 ENCOUNTER — HOSPITAL ENCOUNTER (OUTPATIENT)
Dept: CARDIOLOGY | Facility: HOSPITAL | Age: 75
Discharge: HOME | End: 2025-02-12
Payer: MEDICARE

## 2025-02-12 DIAGNOSIS — R55 SYNCOPE AND COLLAPSE: ICD-10-CM

## 2025-02-12 PROCEDURE — 93296 REM INTERROG EVL PM/IDS: CPT

## 2025-02-18 ENCOUNTER — HOSPITAL ENCOUNTER (OUTPATIENT)
Dept: RADIOLOGY | Facility: CLINIC | Age: 75
Discharge: HOME | End: 2025-02-18
Payer: MEDICARE

## 2025-02-18 ENCOUNTER — OFFICE VISIT (OUTPATIENT)
Dept: ORTHOPEDIC SURGERY | Facility: CLINIC | Age: 75
End: 2025-02-18
Payer: MEDICARE

## 2025-02-18 DIAGNOSIS — S42.142A DISPLACED FRACTURE OF GLENOID CAVITY OF SCAPULA, LEFT SHOULDER, INITIAL ENCOUNTER FOR CLOSED FRACTURE: Primary | ICD-10-CM

## 2025-02-18 DIAGNOSIS — S42.142A DISPLACED FRACTURE OF GLENOID CAVITY OF SCAPULA, LEFT SHOULDER, INITIAL ENCOUNTER FOR CLOSED FRACTURE: ICD-10-CM

## 2025-02-18 PROCEDURE — 73030 X-RAY EXAM OF SHOULDER: CPT | Mod: LEFT SIDE | Performed by: RADIOLOGY

## 2025-02-18 PROCEDURE — 99211 OFF/OP EST MAY X REQ PHY/QHP: CPT | Performed by: ORTHOPAEDIC SURGERY

## 2025-02-18 PROCEDURE — 4010F ACE/ARB THERAPY RXD/TAKEN: CPT | Performed by: ORTHOPAEDIC SURGERY

## 2025-02-18 PROCEDURE — 1036F TOBACCO NON-USER: CPT | Performed by: ORTHOPAEDIC SURGERY

## 2025-02-18 PROCEDURE — 73030 X-RAY EXAM OF SHOULDER: CPT | Mod: LT

## 2025-02-18 PROCEDURE — 1160F RVW MEDS BY RX/DR IN RCRD: CPT | Performed by: ORTHOPAEDIC SURGERY

## 2025-02-18 PROCEDURE — 1159F MED LIST DOCD IN RCRD: CPT | Performed by: ORTHOPAEDIC SURGERY

## 2025-02-18 RX ORDER — HYDROCODONE BITARTRATE AND ACETAMINOPHEN 5; 325 MG/1; MG/1
1 TABLET ORAL EVERY 8 HOURS PRN
Qty: 21 TABLET | Refills: 0 | Status: SHIPPED | OUTPATIENT
Start: 2025-02-18 | End: 2025-02-25

## 2025-02-18 ASSESSMENT — PAIN - FUNCTIONAL ASSESSMENT: PAIN_FUNCTIONAL_ASSESSMENT: 0-10

## 2025-02-18 ASSESSMENT — PAIN SCALES - GENERAL: PAINLEVEL_OUTOF10: 5 - MODERATE PAIN

## 2025-02-19 NOTE — PROGRESS NOTES
Reason for Appointment  Chief Complaint   Patient presents with    Left Shoulder - Post-op     History of Present Illness  Patient is here today 2 weeks s/p a left reverse shoulder replacement s/p dislocation and large glenoid fracture on 2/5/25.  She is doing well, we are going slow with her in terms of shoulder motion with the large glenoid fracture and poor bone quality.  X-rays taken today are reviewed and look excellent, no loosening of hardware.  Wound is healing nicely with no signs of infection.  She will continue to work on slowly increasing shoulder pendulums, over the next 6 weeks and can use the hand for simple daily activities.  We will follow-up with her in 6 weeks with repeat x-ray of the left shoulder.    Assessment   Encounter Diagnosis   Name Primary?    Displaced fracture of glenoid cavity of scapula, left shoulder, initial encounter for closed fracture Yes

## 2025-02-21 RX ORDER — TRAMADOL HYDROCHLORIDE 50 MG/1
50 TABLET ORAL EVERY 8 HOURS PRN
Qty: 180 TABLET | Refills: 1 | OUTPATIENT
Start: 2025-02-21

## 2025-02-27 RX ORDER — TRAMADOL HYDROCHLORIDE 50 MG/1
50 TABLET ORAL EVERY 8 HOURS PRN
Qty: 10 TABLET | OUTPATIENT
Start: 2025-02-27

## 2025-03-11 RX ORDER — TRAMADOL HYDROCHLORIDE 50 MG/1
50 TABLET ORAL EVERY 8 HOURS PRN
Qty: 10 TABLET | OUTPATIENT
Start: 2025-03-11

## 2025-03-14 ENCOUNTER — HOSPITAL ENCOUNTER (OUTPATIENT)
Dept: CARDIOLOGY | Facility: HOSPITAL | Age: 75
Discharge: HOME | End: 2025-03-14
Payer: MEDICARE

## 2025-03-14 ENCOUNTER — APPOINTMENT (OUTPATIENT)
Dept: CARDIOLOGY | Facility: HOSPITAL | Age: 75
End: 2025-03-14
Payer: MEDICARE

## 2025-03-14 DIAGNOSIS — R55 SYNCOPE AND COLLAPSE: ICD-10-CM

## 2025-04-01 ENCOUNTER — OFFICE VISIT (OUTPATIENT)
Dept: ORTHOPEDIC SURGERY | Facility: CLINIC | Age: 75
End: 2025-04-01
Payer: MEDICARE

## 2025-04-01 ENCOUNTER — HOSPITAL ENCOUNTER (OUTPATIENT)
Dept: RADIOLOGY | Facility: CLINIC | Age: 75
Discharge: HOME | End: 2025-04-01
Payer: MEDICARE

## 2025-04-01 VITALS — HEIGHT: 69 IN | BODY MASS INDEX: 26.66 KG/M2 | WEIGHT: 180 LBS

## 2025-04-01 DIAGNOSIS — M19.011 OSTEOARTHRITIS OF RIGHT SHOULDER, UNSPECIFIED OSTEOARTHRITIS TYPE: ICD-10-CM

## 2025-04-01 DIAGNOSIS — S42.142A DISPLACED FRACTURE OF GLENOID CAVITY OF SCAPULA, LEFT SHOULDER, INITIAL ENCOUNTER FOR CLOSED FRACTURE: ICD-10-CM

## 2025-04-01 DIAGNOSIS — S42.142A DISPLACED FRACTURE OF GLENOID CAVITY OF SCAPULA, LEFT SHOULDER, INITIAL ENCOUNTER FOR CLOSED FRACTURE: Primary | ICD-10-CM

## 2025-04-01 PROCEDURE — 1160F RVW MEDS BY RX/DR IN RCRD: CPT | Performed by: ORTHOPAEDIC SURGERY

## 2025-04-01 PROCEDURE — 73030 X-RAY EXAM OF SHOULDER: CPT | Mod: 50

## 2025-04-01 PROCEDURE — 3008F BODY MASS INDEX DOCD: CPT | Performed by: ORTHOPAEDIC SURGERY

## 2025-04-01 PROCEDURE — 99213 OFFICE O/P EST LOW 20 MIN: CPT | Mod: 24 | Performed by: ORTHOPAEDIC SURGERY

## 2025-04-01 PROCEDURE — 1125F AMNT PAIN NOTED PAIN PRSNT: CPT | Performed by: ORTHOPAEDIC SURGERY

## 2025-04-01 PROCEDURE — 99213 OFFICE O/P EST LOW 20 MIN: CPT | Performed by: ORTHOPAEDIC SURGERY

## 2025-04-01 PROCEDURE — 1036F TOBACCO NON-USER: CPT | Performed by: ORTHOPAEDIC SURGERY

## 2025-04-01 PROCEDURE — 73030 X-RAY EXAM OF SHOULDER: CPT | Mod: BILATERAL PROCEDURE | Performed by: RADIOLOGY

## 2025-04-01 PROCEDURE — 4010F ACE/ARB THERAPY RXD/TAKEN: CPT | Performed by: ORTHOPAEDIC SURGERY

## 2025-04-01 PROCEDURE — 1159F MED LIST DOCD IN RCRD: CPT | Performed by: ORTHOPAEDIC SURGERY

## 2025-04-01 ASSESSMENT — PATIENT HEALTH QUESTIONNAIRE - PHQ9
2. FEELING DOWN, DEPRESSED OR HOPELESS: NOT AT ALL
SUM OF ALL RESPONSES TO PHQ9 QUESTIONS 1 AND 2: 0
1. LITTLE INTEREST OR PLEASURE IN DOING THINGS: NOT AT ALL

## 2025-04-01 ASSESSMENT — COLUMBIA-SUICIDE SEVERITY RATING SCALE - C-SSRS
2. HAVE YOU ACTUALLY HAD ANY THOUGHTS OF KILLING YOURSELF?: NO
1. IN THE PAST MONTH, HAVE YOU WISHED YOU WERE DEAD OR WISHED YOU COULD GO TO SLEEP AND NOT WAKE UP?: NO
6. HAVE YOU EVER DONE ANYTHING, STARTED TO DO ANYTHING, OR PREPARED TO DO ANYTHING TO END YOUR LIFE?: NO

## 2025-04-01 ASSESSMENT — PAIN SCALES - GENERAL: PAINLEVEL_OUTOF10: 5

## 2025-04-02 ASSESSMENT — ENCOUNTER SYMPTOMS
TROUBLE SWALLOWING: 0
SINUS PAIN: 0
EYE DISCHARGE: 0
FEVER: 0
ARTHRALGIAS: 1
WHEEZING: 0
CHILLS: 0
SHORTNESS OF BREATH: 0
JOINT SWELLING: 0

## 2025-04-02 NOTE — PROGRESS NOTES
Reason for Appointment  Chief Complaint   Patient presents with    Left Shoulder - Pain     History of Present Illness  Patient is a 75 y.o. female here today for follow-up evaluation 2 months out status post a left reverse shoulder replacement for a dislocation and glenoid fracture.  And almost a year and a half status post a right reverse shoulder replacement.  She is doing well, we have gone slow with her on the left shoulder due to the glenoid fracture and soft bone but she is getting stiff.  We discussed working on shoulder motion and she is not interested in doing any formal therapy.  Continued pain in the shoulder.  X-rays taken today of the left shoulder are reviewed and look excellent, no loosening of hardware and x-rays taken today of the right shoulder are also reviewed and do not show any loosening of hardware and some mild scapular notching.  No other changes in her past medical history, allergies, or medications.    Past Medical History:   Diagnosis Date    Abnormal finding of blood chemistry, unspecified 08/08/2016    Abnormal blood chemistry    Abnormal weight gain 03/14/2014    Weight gain    Arthritis     Asthma     Colitis 08/31/2023    Coronary artery disease     Delayed emergence from general anesthesia     Encounter for immunization 08/08/2016    Need for shingles vaccine    Fracture of distal end of radius 11/01/2018    GERD (gastroesophageal reflux disease)     H/O Bell's palsy 03/14/2012    Hyperlipidemia     Hyperlipidemia     Hypertension     Hypertension     Neuropathy     Other abnormal glucose 08/08/2016    Other abnormal glucose    Other conditions influencing health status     No significant past medical history    Pacemaker     first pacemaker 2000; 2nd one 2015 per pt report    Pain in right shoulder 02/02/2016    Right shoulder pain    Pain in right shoulder 02/02/2016    Right shoulder pain    Pathological fracture due to osteoporosis 01/10/2019    Peripheral neuropathy      Personal history of diseases of the skin and subcutaneous tissue 08/08/2016    History of solar lentigo    Personal history of other diseases of the nervous system and sense organs 02/05/2016    History of polyneuropathy    Personal history of other specified conditions 08/08/2016    History of shortness of breath    Pneumonia     8/2023    PONV (postoperative nausea and vomiting)     Primary stabbing headache 08/08/2016    Primary stabbing headache    Shortness of breath     Syncope     Type 2 diabetes mellitus     Urinary tract infection        Past Surgical History:   Procedure Laterality Date    APPENDECTOMY      BREAST CYST EXCISION Left     CARDIAC CATHETERIZATION      HYSTERECTOMY  02/02/2016    Hysterectomy    INSERT / REPLACE / REMOVE PACEMAKER      LAPAROSCOPIC NISSEN FUNDOPLICATION      PACEMAKER PLACEMENT Left     TOTAL SHOULDER ARTHROPLASTY Right 11/15/2023    WRIST FRACTURE SURGERY Left     with hardware       Medication Documentation Review Audit       Reviewed by Rome Bear MD (Physician) on 04/02/25 at 1413      Medication Order Taking? Sig Documenting Provider Last Dose Status   acetaminophen (Tylenol 8 HOUR) 650 mg ER tablet [id number] Yes Take 2 tablets (1,300 mg) by mouth if needed. Do not crush, chew, or split. Historical Provider, MD  Active   albuterol (Proventil HFA) 90 mcg/actuation inhaler [id number] Yes 2 puffs every 6 hours if needed for shortness of breath. Historical Provider, MD  Active   ammonium lactate (Lac-Hydrin) 12 % lotion [id number] Yes Apply topically if needed. Historical Provider, MD  Active   aspirin 81 mg EC tablet [id number] Yes Take 1 tablet (81 mg) by mouth once daily. Historical Provider, MD  Active   calcium carbonate/vitamin D3 (CALCIUM 600 + D,3, ORAL) [id number] Yes Take 1 tablet by mouth at 3:00 in the morning. Historical Provider, MD  Active   cyclobenzaprine (Flexeril) 5 mg tablet [id number]  Take 1 tablet (5 mg) by mouth 3 times a day as needed for muscle  spasms for up to 5 days. Lydia Zendejas MD   25 235   gabapentin (Neurontin) 600 mg tablet [id number] Yes Take 1 tablet (600 mg) by mouth 3 times a day. Lui Lock DO  Active   glucosamine-chondroitin 500-400 mg tablet [id number] Yes Take 1 tablet by mouth once daily in the morning. Historical Provider, MD  Active   lisinopril 10 mg tablet [id number] Yes Take 1 tablet (10 mg) by mouth once daily. Lui Lock DO  Active   loratadine (Claritin) 10 mg tablet [id number]  Take 1 tablet (10 mg) by mouth once daily.   Patient not taking: Reported on 2025    Terri Chino APRN-CNP   25 235   metFORMIN  mg 24 hr tablet [id number] Yes Take 1 tablet (500 mg) by mouth once daily in the evening. Take with meals. Lui Lock DO  Active   multivit-minerals/folic acid (CENTRUM ADULT 50 PLUS ORAL) [id number] Yes Take 1 tablet by mouth once daily. Historical Provider, MD  Active   omega 3-dha-epa-fish oil (Fish OiL) 1,000 mg (120 mg-180 mg) capsule [id number] Yes Take 1 capsule (1,000 mg) by mouth once daily. Historical Provider, MD  Active   oxyCODONE (Roxicodone) 5 mg immediate release tablet [id number]  Take 1 tablet (5 mg) by mouth every 6 hours if needed for severe pain (7 - 10).   Patient not taking: Reported on 2025    Jesus Gandhi APRN-CNP  Active   pantoprazole (ProtoNix) 40 mg EC tablet [id number] Yes Take 1 tablet (40 mg) by mouth once daily in the morning. Take before meals. Lui Lock DO  Active   PAPAYA ENZYME ORAL [id number] Yes Take 2 tablets by mouth 2 times a day. Historical Provider, MD  Active   simvastatin (Zocor) 20 mg tablet [id number] Yes Take 1 tablet (20 mg) by mouth once daily at bedtime. Lui Lock DO  Active   traMADol (Ultram) 50 mg tablet [id number]  Take 1 tablet (50 mg) by mouth every 8 hours if needed for severe pain (7 - 10).   Patient not taking: Reported on 2025    Historical Provider, MD  Active   triamcinolone (Kenalog)  0.1 % cream [id number] Yes Apply 1 Application topically 2 times a day as needed for irritation. Apply to affected area Lui BANG Lock, DO  Active                    Allergies   Allergen Reactions    Codeine Hives and Rash       Review of Systems   Constitutional:  Negative for chills and fever.   HENT:  Negative for mouth sores, sinus pain and trouble swallowing.    Eyes:  Negative for discharge.   Respiratory:  Negative for shortness of breath and wheezing.    Cardiovascular:  Negative for chest pain.   Musculoskeletal:  Positive for arthralgias. Negative for joint swelling.   Skin:  Negative for pallor and rash.   All other systems reviewed and are negative.    Exam   On exam right shoulder shows good active forward flexion over 140 degrees with no crepitation.  No scapular or acromial tenderness on the right.  She does have stiffness with about 80 degrees of active forward flexion today on the left.  Well-healed scar with no signs of infection or effusion.  No crepitation with shoulder motion.  Good pulses and sensation in the upper extremities.    Assessment   Encounter Diagnoses   Name Primary?    Displaced fracture of glenoid cavity of scapula, left shoulder, initial encounter for closed fracture Yes    Osteoarthritis of right shoulder, unspecified osteoarthritis type        Plan   Again, we showed her home exercises to get this shoulder moving as she is getting stiff.  She will work on active assist forward flexion and not push rotation and no heavy lifting with this arm.  We will follow-up with her in 2 months with repeat x-rays of the left shoulder.    IDebi PA-C, am acting as a scribe and attest that this documentation has been prepared under the direction and in the presence of Rome Bear MD.    By signing below, IRome MD, personally performed the services described in this documentation. All medical record entries made by the scribe were at my direction and in my presence. I  have reviewed the chart and agree that the record reflects my personal performance and is accurate and complete.

## 2025-04-22 ENCOUNTER — HOSPITAL ENCOUNTER (OUTPATIENT)
Dept: CARDIOLOGY | Facility: HOSPITAL | Age: 75
Discharge: HOME | End: 2025-04-22
Payer: MEDICARE

## 2025-04-22 DIAGNOSIS — R55 SYNCOPE AND COLLAPSE: ICD-10-CM

## 2025-04-24 ENCOUNTER — APPOINTMENT (OUTPATIENT)
Dept: CARDIOLOGY | Facility: HOSPITAL | Age: 75
End: 2025-04-24
Payer: MEDICARE

## 2025-04-30 ENCOUNTER — TELEPHONE (OUTPATIENT)
Dept: PRIMARY CARE | Facility: CLINIC | Age: 75
End: 2025-04-30
Payer: MEDICARE

## 2025-04-30 DIAGNOSIS — I10 BENIGN ESSENTIAL HYPERTENSION: ICD-10-CM

## 2025-04-30 NOTE — TELEPHONE ENCOUNTER
Patient called concerned about her BP she said that she takes her BP  and meds between 8-830  every day. She said that she is doing this weight loss program and they sent her a new machine and she has been running high for her blood pressure. She said that this morning is was 139/90 and then she took her BP meds. She took her BP again while on the phone with me and it was 160/80. She said that about 4 days ago it was 193/98. She said that sometimes she feels dizzy also. Said that she was not dizzy today but  is concerned that her BP is so elevated. Please advise.  PL

## 2025-05-14 ENCOUNTER — OFFICE VISIT (OUTPATIENT)
Dept: PRIMARY CARE | Facility: CLINIC | Age: 75
End: 2025-05-14
Payer: MEDICARE

## 2025-05-14 VITALS
SYSTOLIC BLOOD PRESSURE: 112 MMHG | WEIGHT: 182.4 LBS | DIASTOLIC BLOOD PRESSURE: 74 MMHG | OXYGEN SATURATION: 95 % | BODY MASS INDEX: 26.94 KG/M2 | HEART RATE: 74 BPM | RESPIRATION RATE: 18 BRPM | TEMPERATURE: 97.8 F

## 2025-05-14 DIAGNOSIS — M81.0 AGE-RELATED OSTEOPOROSIS WITHOUT CURRENT PATHOLOGICAL FRACTURE: Primary | ICD-10-CM

## 2025-05-14 DIAGNOSIS — E55.9 VITAMIN D DEFICIENCY: ICD-10-CM

## 2025-05-14 DIAGNOSIS — Z78.0 ASYMPTOMATIC MENOPAUSAL STATE: ICD-10-CM

## 2025-05-14 DIAGNOSIS — E11.9 NEW ONSET TYPE 2 DIABETES MELLITUS (MULTI): ICD-10-CM

## 2025-05-14 DIAGNOSIS — I10 BENIGN ESSENTIAL HYPERTENSION: ICD-10-CM

## 2025-05-14 DIAGNOSIS — M16.0 BILATERAL PRIMARY OSTEOARTHRITIS OF HIP: ICD-10-CM

## 2025-05-14 DIAGNOSIS — E78.2 MIXED HYPERLIPIDEMIA: ICD-10-CM

## 2025-05-14 LAB — POC HEMOGLOBIN A1C: 5 % (ref 4.2–6.5)

## 2025-05-14 PROCEDURE — 1036F TOBACCO NON-USER: CPT | Performed by: FAMILY MEDICINE

## 2025-05-14 PROCEDURE — 99214 OFFICE O/P EST MOD 30 MIN: CPT | Mod: 25 | Performed by: FAMILY MEDICINE

## 2025-05-14 PROCEDURE — 83036 HEMOGLOBIN GLYCOSYLATED A1C: CPT | Mod: QW | Performed by: FAMILY MEDICINE

## 2025-05-14 PROCEDURE — 1126F AMNT PAIN NOTED NONE PRSNT: CPT | Performed by: FAMILY MEDICINE

## 2025-05-14 PROCEDURE — 99214 OFFICE O/P EST MOD 30 MIN: CPT | Performed by: FAMILY MEDICINE

## 2025-05-14 PROCEDURE — 3044F HG A1C LEVEL LT 7.0%: CPT | Performed by: FAMILY MEDICINE

## 2025-05-14 PROCEDURE — 1159F MED LIST DOCD IN RCRD: CPT | Performed by: FAMILY MEDICINE

## 2025-05-14 PROCEDURE — 4010F ACE/ARB THERAPY RXD/TAKEN: CPT | Performed by: FAMILY MEDICINE

## 2025-05-14 PROCEDURE — 3078F DIAST BP <80 MM HG: CPT | Performed by: FAMILY MEDICINE

## 2025-05-14 PROCEDURE — 1123F ACP DISCUSS/DSCN MKR DOCD: CPT | Performed by: FAMILY MEDICINE

## 2025-05-14 PROCEDURE — 1160F RVW MEDS BY RX/DR IN RCRD: CPT | Performed by: FAMILY MEDICINE

## 2025-05-14 PROCEDURE — 1158F ADVNC CARE PLAN TLK DOCD: CPT | Performed by: FAMILY MEDICINE

## 2025-05-14 PROCEDURE — 3074F SYST BP LT 130 MM HG: CPT | Performed by: FAMILY MEDICINE

## 2025-05-14 RX ORDER — LISINOPRIL 10 MG/1
20 TABLET ORAL DAILY
Qty: 90 TABLET | Refills: 3 | Status: SHIPPED | OUTPATIENT
Start: 2025-05-14 | End: 2026-05-14

## 2025-05-14 ASSESSMENT — LIFESTYLE VARIABLES
AUDIT-C TOTAL SCORE: 4
HOW OFTEN DO YOU HAVE A DRINK CONTAINING ALCOHOL: 4 OR MORE TIMES A WEEK
HOW MANY STANDARD DRINKS CONTAINING ALCOHOL DO YOU HAVE ON A TYPICAL DAY: 1 OR 2
HOW OFTEN DO YOU HAVE SIX OR MORE DRINKS ON ONE OCCASION: NEVER
SKIP TO QUESTIONS 9-10: 1

## 2025-05-14 ASSESSMENT — ENCOUNTER SYMPTOMS
HYPERTENSION: 1
LOSS OF SENSATION IN FEET: 0
OCCASIONAL FEELINGS OF UNSTEADINESS: 1
DEPRESSION: 0

## 2025-05-14 ASSESSMENT — PATIENT HEALTH QUESTIONNAIRE - PHQ9
SUM OF ALL RESPONSES TO PHQ9 QUESTIONS 1 & 2: 0
1. LITTLE INTEREST OR PLEASURE IN DOING THINGS: NOT AT ALL
2. FEELING DOWN, DEPRESSED OR HOPELESS: NOT AT ALL

## 2025-05-14 ASSESSMENT — PAIN SCALES - GENERAL: PAINLEVEL_OUTOF10: 0-NO PAIN

## 2025-05-14 NOTE — PROGRESS NOTES
Subjective   Patient ID: Edda Webb is a 75 y.o. female who presents for Hypertension and bite on left eyebrow.    Patient is here for follow up.      Left shoulder pain  -Patient tripped on steps - landed on top of shoulder in feb.  Patient had replacement in the left shoulder.   Hx of osteoporosis    Wound left forehead  -Watching grandkids.  Pt got stung on left side.  Patient then developed wound above eye.  Started 5 days ago.  Used afterbite.  Itching.       Hip Pain  -Sx's: Patient is having worsening symptoms.  Limiting her walking.  Xray showed bilateral severe arthritis.    -Duration: chronic   -Evaluation: xrays  -Treatment:  Tylenol, aspirin - helped.    -Specialist:    For DM2:  -A1c: 5.9  -Follow up: metformin is working well.  Tolerating well.  Still has neuropathy - improved.    -Hypoglycemic Episodes: none  -Glucose monitoring:      GERD:  -F/U: doing well.  No new changes.  No recent follow up with Dr. Quintanilla  -Dysphagia: none   -Hx of EGD: Dr. Quintanilla -2016  -Changes in Bowels: none  -Blood in stool:  none    Hypertension  -F/U: Pt has started a program with insurance called [employer].  Patient was having episodes of dizziness.  Patient has been checking blood pressure.  Pt started keeping track.  Highest 190/90.  Last few days - 124/85 this am.  Yesterday 121/85.    -Home Monitoring:    -Cardiac symptoms: none.   -Patient denies chest pain, dyspnea, and lower extremity edema.   -Cardiologist:        Hypertension    Diabetes         Review of Systems    Objective   /68   Pulse 74   Temp 36.6 °C (97.8 °F) (Temporal)   Resp 18   Wt 82.7 kg (182 lb 6.4 oz)   SpO2 95%   BMI 26.94 kg/m²     Physical Exam  Vitals reviewed.   Constitutional:       General: She is not in acute distress.  Cardiovascular:      Rate and Rhythm: Normal rate and regular rhythm.   Pulmonary:      Effort: Pulmonary effort is normal.      Breath sounds: No wheezing or rhonchi.   Musculoskeletal:      Right lower  leg: No edema.      Left lower leg: No edema.      Comments: Limited ROM in left shoulder   Lymphadenopathy:      Cervical: No cervical adenopathy.   Skin:     Comments: Healing wound on left lateral eyebrow - no redness or drainage, no swelling   Neurological:      Mental Status: She is alert.         Assessment/Plan   Diagnoses and all orders for this visit:  Age-related osteoporosis without current pathological fracture  -     Referral to Rheumatology; Future  New onset type 2 diabetes mellitus (Multi)  -     POCT glycosylated hemoglobin (Hb A1C) manually resulted  -     CBC and Auto Differential; Future  -     Comprehensive Metabolic Panel; Future  -     Lipid Panel; Future  Benign essential hypertension  -     lisinopril 10 mg tablet; Take 2 tablets (20 mg) by mouth once daily.  -     CBC and Auto Differential; Future  -     Comprehensive Metabolic Panel; Future  -     Lipid Panel; Future  -     TSH with reflex to Free T4 if abnormal; Future  Asymptomatic menopausal state  -     XR DEXA bone density; Future  Mixed hyperlipidemia  -     Lipid Panel; Future  Vitamin D deficiency  -     Vitamin D 25-Hydroxy,Total (for eval of Vitamin D levels); Future    Patient Instructions   Patient is here for follow up.      For blood pressure -well controlled.  Please check blood pressure 2-3 times a week. Goal is <140/90. If you are above this frequently, please call for medication adjustment. Recommend upper arm electronic cuff. Continue lisinopril 20mg.     For Diabetes - A1c is 5 - well controlled.  We will stop metformin. Recheck in 3 months    For cholesterol - check blood work    For osteoporosis - fractures likely due to osteoprosis.  Unable to use bisphonates due to esophagus issues.  Recommend seeing Dr. Aguilar - rheumatology.  May need evenity and then prolia.      For wound above eye - use bacitrin 1-2 times a day and then claritin or zyrtec for itching.  Use cool compress.     For bilateral hip OA - if worsening  we will refer to orthopedics for possible injections    Follow up as scheduled

## 2025-05-20 ENCOUNTER — APPOINTMENT (OUTPATIENT)
Dept: ORTHOPEDIC SURGERY | Facility: CLINIC | Age: 75
End: 2025-05-20
Payer: MEDICARE

## 2025-05-20 DIAGNOSIS — S42.142A DISPLACED FRACTURE OF GLENOID CAVITY OF SCAPULA, LEFT SHOULDER, INITIAL ENCOUNTER FOR CLOSED FRACTURE: Primary | ICD-10-CM

## 2025-05-22 ENCOUNTER — TELEPHONE (OUTPATIENT)
Dept: RHEUMATOLOGY | Facility: CLINIC | Age: 75
End: 2025-05-22

## 2025-05-22 ENCOUNTER — HOSPITAL ENCOUNTER (OUTPATIENT)
Dept: CARDIOLOGY | Facility: HOSPITAL | Age: 75
Discharge: HOME | End: 2025-05-22
Payer: MEDICARE

## 2025-05-22 ENCOUNTER — PATIENT MESSAGE (OUTPATIENT)
Dept: PRIMARY CARE | Facility: CLINIC | Age: 75
End: 2025-05-22

## 2025-05-22 DIAGNOSIS — R55 SYNCOPE AND COLLAPSE: ICD-10-CM

## 2025-05-22 PROCEDURE — 93296 REM INTERROG EVL PM/IDS: CPT

## 2025-05-28 ENCOUNTER — HOSPITAL ENCOUNTER (OUTPATIENT)
Dept: RADIOLOGY | Facility: HOSPITAL | Age: 75
Discharge: HOME | End: 2025-05-28
Payer: MEDICARE

## 2025-05-28 DIAGNOSIS — Z78.0 ASYMPTOMATIC MENOPAUSAL STATE: ICD-10-CM

## 2025-05-28 PROCEDURE — 77080 DXA BONE DENSITY AXIAL: CPT

## 2025-05-28 PROCEDURE — 77080 DXA BONE DENSITY AXIAL: CPT | Performed by: RADIOLOGY

## 2025-06-03 ENCOUNTER — HOSPITAL ENCOUNTER (OUTPATIENT)
Dept: RADIOLOGY | Facility: CLINIC | Age: 75
Discharge: HOME | End: 2025-06-03
Payer: MEDICARE

## 2025-06-03 ENCOUNTER — OFFICE VISIT (OUTPATIENT)
Dept: ORTHOPEDIC SURGERY | Facility: CLINIC | Age: 75
End: 2025-06-03
Payer: MEDICARE

## 2025-06-03 DIAGNOSIS — M25.512 BILATERAL SHOULDER PAIN, UNSPECIFIED CHRONICITY: ICD-10-CM

## 2025-06-03 DIAGNOSIS — S42.142A DISPLACED FRACTURE OF GLENOID CAVITY OF SCAPULA, LEFT SHOULDER, INITIAL ENCOUNTER FOR CLOSED FRACTURE: Primary | ICD-10-CM

## 2025-06-03 DIAGNOSIS — M25.511 BILATERAL SHOULDER PAIN, UNSPECIFIED CHRONICITY: ICD-10-CM

## 2025-06-03 DIAGNOSIS — M19.011 OSTEOARTHRITIS OF RIGHT SHOULDER, UNSPECIFIED OSTEOARTHRITIS TYPE: ICD-10-CM

## 2025-06-03 PROCEDURE — G8433 SCR FOR DEP NOT CPT DOC RSN: HCPCS | Performed by: ORTHOPAEDIC SURGERY

## 2025-06-03 PROCEDURE — 73030 X-RAY EXAM OF SHOULDER: CPT | Mod: BILATERAL PROCEDURE | Performed by: RADIOLOGY

## 2025-06-03 PROCEDURE — 99213 OFFICE O/P EST LOW 20 MIN: CPT | Performed by: ORTHOPAEDIC SURGERY

## 2025-06-03 PROCEDURE — 1160F RVW MEDS BY RX/DR IN RCRD: CPT | Performed by: ORTHOPAEDIC SURGERY

## 2025-06-03 PROCEDURE — 1036F TOBACCO NON-USER: CPT | Performed by: ORTHOPAEDIC SURGERY

## 2025-06-03 PROCEDURE — 73030 X-RAY EXAM OF SHOULDER: CPT | Mod: 50

## 2025-06-03 PROCEDURE — 1159F MED LIST DOCD IN RCRD: CPT | Performed by: ORTHOPAEDIC SURGERY

## 2025-06-03 PROCEDURE — 1125F AMNT PAIN NOTED PAIN PRSNT: CPT | Performed by: ORTHOPAEDIC SURGERY

## 2025-06-03 PROCEDURE — 3044F HG A1C LEVEL LT 7.0%: CPT | Performed by: ORTHOPAEDIC SURGERY

## 2025-06-03 PROCEDURE — 4010F ACE/ARB THERAPY RXD/TAKEN: CPT | Performed by: ORTHOPAEDIC SURGERY

## 2025-06-03 ASSESSMENT — PAIN SCALES - GENERAL: PAINLEVEL_OUTOF10: 3

## 2025-06-03 ASSESSMENT — PAIN - FUNCTIONAL ASSESSMENT: PAIN_FUNCTIONAL_ASSESSMENT: 0-10

## 2025-06-04 ASSESSMENT — ENCOUNTER SYMPTOMS
ARTHRALGIAS: 1
JOINT SWELLING: 0
WOUND: 0
SINUS PAIN: 0
WHEEZING: 0
FEVER: 0
CHILLS: 0
TROUBLE SWALLOWING: 0
EYE DISCHARGE: 0
SHORTNESS OF BREATH: 0

## 2025-06-04 NOTE — PROGRESS NOTES
Reason for Appointment  Chief Complaint   Patient presents with    Left Shoulder - Pain    Right Shoulder - Pain     History of Present Illness  Patient is a 75 y.o. female here today for follow-up evaluation about 6 months status post a left reverse shoulder replacement for dislocation and large glenoid fracture and a year and a half status post right reverse shoulder replacement.  Right side is doing well, she has much better motion and only mild pain.  We did go slow with left shoulder motion and she is improving, slowly stretching the shoulder out.  She is doing home exercises which is fine.  X-rays taken today are reviewed and look excellent, no loosening of hardware, mild stable scapular notching on the right.  No other changes in her past medical history, allergies, medications.    Medical History[1]    Surgical History[2]    Medication Documentation Review Audit       Reviewed by Kim Alvarado MA (Medical Assistant) on 25 at 0954      Medication Order Taking? Sig Documenting Provider Last Dose Status   acetaminophen (Tylenol 8 HOUR) 650 mg ER tablet [id number] Yes Take 2 tablets (1,300 mg) by mouth if needed. Do not crush, chew, or split. Historical Provider, MD  Active   ammonium lactate (Lac-Hydrin) 12 % lotion [id number] Yes Apply topically if needed. Historical Provider, MD  Active   aspirin 81 mg EC tablet [id number] Yes Take 1 tablet (81 mg) by mouth once daily. Historical Provider, MD  Active   calcium carbonate/vitamin D3 (CALCIUM 600 + D,3, ORAL) [id number] Yes Take 1 tablet by mouth at 3:00 in the morning. Historical Provider, MD  Active   cyclobenzaprine (Flexeril) 5 mg tablet [id number]  Take 1 tablet (5 mg) by mouth 3 times a day as needed for muscle spasms for up to 5 days. Lydia Zendejas MD   25 [id number]   gabapentin (Neurontin) 600 mg tablet [id number] Yes Take 1 tablet (600 mg) by mouth 3 times a day. Lui Lock,   Active   lisinopril 10 mg tablet [id number] Yes Take  2 tablets (20 mg) by mouth once daily. Lui Lock DO  Active   multivit-minerals/folic acid (CENTRUM ADULT 50 PLUS ORAL) [id number] Yes Take 1 tablet by mouth once daily. Historical Provider, MD  Active   omega 3-dha-epa-fish oil (Fish OiL) 1,000 mg (120 mg-180 mg) capsule [id number] Yes Take 1 capsule (1,000 mg) by mouth once daily. Historical Provider, MD  Active   pantoprazole (ProtoNix) 40 mg EC tablet [id number] Yes Take 1 tablet (40 mg) by mouth once daily in the morning. Take before meals. Lui Lock DO  Active   PAPAYA ENZYME ORAL [id number] Yes Take 2 tablets by mouth 2 times a day. Historical Provider, MD  Active   simvastatin (Zocor) 20 mg tablet [id number] Yes Take 1 tablet (20 mg) by mouth once daily at bedtime. Lui Lock DO  Active   triamcinolone (Kenalog) 0.1 % cream [id number] Yes Apply 1 Application topically 2 times a day as needed for irritation. Apply to affected area Lui Lock DO  Active                    RX Allergies[3]    Review of Systems   Constitutional:  Negative for chills and fever.   HENT:  Negative for mouth sores, sinus pain and trouble swallowing.    Eyes:  Negative for discharge.   Respiratory:  Negative for shortness of breath and wheezing.    Cardiovascular:  Negative for chest pain.   Musculoskeletal:  Positive for arthralgias. Negative for joint swelling.   Skin:  Negative for pallor and wound.   All other systems reviewed and are negative.    Exam   On exam the right shoulder shows active forward flexion over 140 degrees, left shoulder shows about 100 degrees of active forward flexion.  Well-healed incisions with no swelling or effusions.  No crepitation with shoulder motion.  No significant acromial or scapular tenderness bilaterally. Good pulses and sensation in the upper extremities.    Assessment   Left shoulder glenoid fracture  Right shoulder osteoarthritis    Plan   She is doing well on the right side, she can work on continued gentle stretching of  the left shoulder and hopefully motion will continue to slowly improve.  She can work on simple ADLs and light repetitive motion with the left arm especially but she is careful with heavy lifting.  We will follow-up with her in 4 months with repeat x-rays of the left shoulder only.    IDebi PA-C, am acting as a scribe and attest that this documentation has been prepared under the direction and in the presence of Rome Bear MD.    By signing below, I, Rome Bear MD, personally performed the services described in this documentation. All medical record entries made by the scribe were at my direction and in my presence. I have reviewed the chart and agree that the record reflects my personal performance and is accurate and complete.                       [1]   Past Medical History:  Diagnosis Date    Abnormal finding of blood chemistry, unspecified 08/08/2016    Abnormal blood chemistry    Abnormal weight gain 03/14/2014    Weight gain    Arthritis     Asthma     Colitis 08/31/2023    Coronary artery disease     Delayed emergence from general anesthesia     Encounter for immunization 08/08/2016    Need for shingles vaccine    Fracture of distal end of radius 11/01/2018    GERD (gastroesophageal reflux disease)     H/O Bell's palsy 03/14/2012    Hyperlipidemia     Hyperlipidemia     Hypertension     Hypertension     Neuropathy     Other abnormal glucose 08/08/2016    Other abnormal glucose    Other conditions influencing health status     No significant past medical history    Pacemaker     first pacemaker 2000; 2nd one 2015 per pt report    Pain in right shoulder 02/02/2016    Right shoulder pain    Pain in right shoulder 02/02/2016    Right shoulder pain    Pathological fracture due to osteoporosis 01/10/2019    Peripheral neuropathy     Personal history of diseases of the skin and subcutaneous tissue 08/08/2016    History of solar lentigo    Personal history of other diseases of the nervous system  and sense organs 02/05/2016    History of polyneuropathy    Personal history of other specified conditions 08/08/2016    History of shortness of breath    Pneumonia     8/2023    PONV (postoperative nausea and vomiting)     Primary stabbing headache 08/08/2016    Primary stabbing headache    Shortness of breath     Syncope     Type 2 diabetes mellitus     Urinary tract infection    [2]   Past Surgical History:  Procedure Laterality Date    APPENDECTOMY      BREAST CYST EXCISION Left     CARDIAC CATHETERIZATION      HYSTERECTOMY  02/02/2016    Hysterectomy    INSERT / REPLACE / REMOVE PACEMAKER      LAPAROSCOPIC NISSEN FUNDOPLICATION      PACEMAKER PLACEMENT Left     SHOULDER SURGERY Left 02/05/2025    TOTAL SHOULDER ARTHROPLASTY Right 11/15/2023    WRIST FRACTURE SURGERY Left     with hardware   [3]   Allergies  Allergen Reactions    Codeine Hives and Rash

## 2025-06-19 ENCOUNTER — HOSPITAL ENCOUNTER (OUTPATIENT)
Dept: CARDIOLOGY | Facility: HOSPITAL | Age: 75
Discharge: HOME | End: 2025-06-19
Payer: MEDICARE

## 2025-06-19 DIAGNOSIS — R55 SYNCOPE AND COLLAPSE: ICD-10-CM

## 2025-06-23 DIAGNOSIS — S42.142A DISPLACED FRACTURE OF GLENOID CAVITY OF SCAPULA, LEFT SHOULDER, INITIAL ENCOUNTER FOR CLOSED FRACTURE: Primary | ICD-10-CM

## 2025-06-23 RX ORDER — AMOXICILLIN 500 MG/1
2000 TABLET, FILM COATED ORAL ONCE
Qty: 4 TABLET | Refills: 3 | Status: SHIPPED | OUTPATIENT
Start: 2025-06-23 | End: 2025-06-23

## 2025-06-27 LAB
25(OH)D3+25(OH)D2 SERPL-MCNC: 68 NG/ML (ref 30–100)
ALBUMIN SERPL-MCNC: 4.2 G/DL (ref 3.6–5.1)
ALP SERPL-CCNC: 62 U/L (ref 37–153)
ALT SERPL-CCNC: 16 U/L (ref 6–29)
ANION GAP SERPL CALCULATED.4IONS-SCNC: 9 MMOL/L (CALC) (ref 7–17)
AST SERPL-CCNC: 16 U/L (ref 10–35)
BASOPHILS # BLD AUTO: 30 CELLS/UL (ref 0–200)
BASOPHILS NFR BLD AUTO: 0.5 %
BILIRUB SERPL-MCNC: 0.3 MG/DL (ref 0.2–1.2)
BUN SERPL-MCNC: 21 MG/DL (ref 7–25)
CALCIUM SERPL-MCNC: 9.3 MG/DL (ref 8.6–10.4)
CHLORIDE SERPL-SCNC: 106 MMOL/L (ref 98–110)
CHOLEST SERPL-MCNC: 156 MG/DL
CHOLEST/HDLC SERPL: 2.2 (CALC)
CO2 SERPL-SCNC: 23 MMOL/L (ref 20–32)
CREAT SERPL-MCNC: 0.85 MG/DL (ref 0.6–1)
EGFRCR SERPLBLD CKD-EPI 2021: 72 ML/MIN/1.73M2
EOSINOPHIL # BLD AUTO: 183 CELLS/UL (ref 15–500)
EOSINOPHIL NFR BLD AUTO: 3.1 %
ERYTHROCYTE [DISTWIDTH] IN BLOOD BY AUTOMATED COUNT: 14.2 % (ref 11–15)
GLUCOSE SERPL-MCNC: 95 MG/DL (ref 65–99)
HCT VFR BLD AUTO: 40.3 % (ref 35–45)
HDLC SERPL-MCNC: 70 MG/DL
HGB BLD-MCNC: 12.9 G/DL (ref 11.7–15.5)
LDLC SERPL CALC-MCNC: 67 MG/DL (CALC)
LYMPHOCYTES # BLD AUTO: 1611 CELLS/UL (ref 850–3900)
LYMPHOCYTES NFR BLD AUTO: 27.3 %
MCH RBC QN AUTO: 29.5 PG (ref 27–33)
MCHC RBC AUTO-ENTMCNC: 32 G/DL (ref 32–36)
MCV RBC AUTO: 92 FL (ref 80–100)
MONOCYTES # BLD AUTO: 797 CELLS/UL (ref 200–950)
MONOCYTES NFR BLD AUTO: 13.5 %
NEUTROPHILS # BLD AUTO: 3280 CELLS/UL (ref 1500–7800)
NEUTROPHILS NFR BLD AUTO: 55.6 %
NONHDLC SERPL-MCNC: 86 MG/DL (CALC)
PLATELET # BLD AUTO: 325 THOUSAND/UL (ref 140–400)
PMV BLD REES-ECKER: 10.7 FL (ref 7.5–12.5)
POTASSIUM SERPL-SCNC: 4.8 MMOL/L (ref 3.5–5.3)
PROT SERPL-MCNC: 6.9 G/DL (ref 6.1–8.1)
RBC # BLD AUTO: 4.38 MILLION/UL (ref 3.8–5.1)
SODIUM SERPL-SCNC: 138 MMOL/L (ref 135–146)
TRIGL SERPL-MCNC: 107 MG/DL
TSH SERPL-ACNC: 2.43 MIU/L (ref 0.4–4.5)
WBC # BLD AUTO: 5.9 THOUSAND/UL (ref 3.8–10.8)

## 2025-07-02 ENCOUNTER — OFFICE VISIT (OUTPATIENT)
Dept: PRIMARY CARE | Facility: CLINIC | Age: 75
End: 2025-07-02
Payer: MEDICARE

## 2025-07-02 VITALS
OXYGEN SATURATION: 98 % | TEMPERATURE: 97 F | HEART RATE: 59 BPM | SYSTOLIC BLOOD PRESSURE: 118 MMHG | BODY MASS INDEX: 26.26 KG/M2 | WEIGHT: 177.8 LBS | DIASTOLIC BLOOD PRESSURE: 78 MMHG

## 2025-07-02 DIAGNOSIS — L82.1 SEBORRHEIC KERATOSIS: ICD-10-CM

## 2025-07-02 DIAGNOSIS — I10 BENIGN ESSENTIAL HYPERTENSION: Primary | ICD-10-CM

## 2025-07-02 DIAGNOSIS — I25.10 CORONARY ARTERY DISEASE INVOLVING NATIVE CORONARY ARTERY OF NATIVE HEART WITHOUT ANGINA PECTORIS: ICD-10-CM

## 2025-07-02 DIAGNOSIS — E55.9 VITAMIN D DEFICIENCY: ICD-10-CM

## 2025-07-02 DIAGNOSIS — E11.9 TYPE 2 DIABETES MELLITUS WITHOUT COMPLICATION, WITHOUT LONG-TERM CURRENT USE OF INSULIN: ICD-10-CM

## 2025-07-02 PROCEDURE — 99214 OFFICE O/P EST MOD 30 MIN: CPT | Performed by: FAMILY MEDICINE

## 2025-07-02 PROCEDURE — 1126F AMNT PAIN NOTED NONE PRSNT: CPT | Performed by: FAMILY MEDICINE

## 2025-07-02 PROCEDURE — 4010F ACE/ARB THERAPY RXD/TAKEN: CPT | Performed by: FAMILY MEDICINE

## 2025-07-02 PROCEDURE — G2211 COMPLEX E/M VISIT ADD ON: HCPCS | Performed by: FAMILY MEDICINE

## 2025-07-02 PROCEDURE — 1159F MED LIST DOCD IN RCRD: CPT | Performed by: FAMILY MEDICINE

## 2025-07-02 PROCEDURE — 1160F RVW MEDS BY RX/DR IN RCRD: CPT | Performed by: FAMILY MEDICINE

## 2025-07-02 PROCEDURE — 3044F HG A1C LEVEL LT 7.0%: CPT | Performed by: FAMILY MEDICINE

## 2025-07-02 PROCEDURE — 1036F TOBACCO NON-USER: CPT | Performed by: FAMILY MEDICINE

## 2025-07-02 PROCEDURE — 3074F SYST BP LT 130 MM HG: CPT | Performed by: FAMILY MEDICINE

## 2025-07-02 PROCEDURE — 3078F DIAST BP <80 MM HG: CPT | Performed by: FAMILY MEDICINE

## 2025-07-02 ASSESSMENT — LIFESTYLE VARIABLES
AUDIT-C TOTAL SCORE: 4
SKIP TO QUESTIONS 9-10: 1
HOW OFTEN DO YOU HAVE SIX OR MORE DRINKS ON ONE OCCASION: NEVER
HOW MANY STANDARD DRINKS CONTAINING ALCOHOL DO YOU HAVE ON A TYPICAL DAY: 1 OR 2
HOW OFTEN DO YOU HAVE A DRINK CONTAINING ALCOHOL: 4 OR MORE TIMES A WEEK

## 2025-07-02 ASSESSMENT — ENCOUNTER SYMPTOMS
HYPERTENSION: 1
LOSS OF SENSATION IN FEET: 0
DEPRESSION: 0
OCCASIONAL FEELINGS OF UNSTEADINESS: 0

## 2025-07-02 ASSESSMENT — PAIN SCALES - GENERAL: PAINLEVEL_OUTOF10: 0-NO PAIN

## 2025-07-02 NOTE — PROGRESS NOTES
Subjective   Patient ID: Edda Webb is a 75 y.o. female who presents for Diabetes.    Patient is here for follow up.      Left shoulder pain  -Patient tripped on steps - landed on top of shoulder in feb.  Patient had replacement in the left shoulder.   Patient saw Dr. Bear - doing home exercises.  Manageable.  Sleeping.    Hx of osteoporosis    Wound left forehead  -Watching grandkids.  Pt got stung on left side.  Patient then developed wound above eye.  Started 5 days ago.  Used afterbite.  Itching.       For DM2:  -A1c: 5.0  -Follow up: Off of metformin.   -Hypoglycemic Episodes: none  -Glucose monitoring:      GERD:  -F/U: doing well.  No new changes.  No recent follow up with Dr. Quintanilla  -Dysphagia: none   -Hx of EGD: Dr. Quintanilla -2016  -Changes in Bowels: none  -Blood in stool:  none    Hypertension  -F/U: Patient has been having elevated readings.  Readings 145/92, 140/90's.    -Home Monitoring:   Checking routinely  -Cardiac symptoms: none.   -Patient denies chest pain, dyspnea, and lower extremity edema.   -Cardiologist:        Diabetes    Hypertension         Review of Systems    Objective   /78 (BP Location: Right arm, Patient Position: Sitting)   Pulse 59   Temp 36.1 °C (97 °F) (Temporal)   Wt 80.6 kg (177 lb 12.8 oz)   SpO2 98%   BMI 26.26 kg/m²     Physical Exam  Vitals reviewed.   Constitutional:       General: She is not in acute distress.  Cardiovascular:      Rate and Rhythm: Normal rate and regular rhythm.   Pulmonary:      Effort: Pulmonary effort is normal.      Breath sounds: No wheezing or rhonchi.   Musculoskeletal:      Right lower leg: No edema.      Left lower leg: No edema.   Lymphadenopathy:      Cervical: No cervical adenopathy.   Neurological:      Mental Status: She is alert.         Assessment/Plan   Diagnoses and all orders for this visit:  Benign essential hypertension  -     CBC and Auto Differential; Future  -     Comprehensive Metabolic Panel; Future  -      Lipid Panel; Future  -     Hemoglobin A1C; Future  -     TSH with reflex to Free T4 if abnormal; Future  Coronary artery disease involving native coronary artery of native heart without angina pectoris  Seborrheic keratosis  Type 2 diabetes mellitus without complication, without long-term current use of insulin  -     Albumin-Creatinine Ratio, Urine Random; Future  -     CBC and Auto Differential; Future  -     Comprehensive Metabolic Panel; Future  -     Lipid Panel; Future  -     Hemoglobin A1C; Future  Vitamin D deficiency  -     Vitamin D 25-Hydroxy,Total (for eval of Vitamin D levels); Future    Patient Instructions   Here for follow up.      For Diabetes - A1c is 5 - doing well off of metformin.      For blood pressure - elevated at home - readings good in office.  Recommend doing nurse visit - bring meter to check accuracy.  Also gave printout on website for monitors.     For osteoporosis - scheduled to see rheumatology in Dec.     Recheck in 6 months - blood work prior.

## 2025-07-02 NOTE — PATIENT INSTRUCTIONS
Here for follow up.      For Diabetes - A1c is 5 - doing well off of metformin.      For blood pressure - elevated at home - readings good in office.  Recommend doing nurse visit - bring meter to check accuracy.  Also gave printout on website for monitors.     For osteoporosis - scheduled to see rheumatology in Dec.     Recheck in 6 months - blood work prior.

## 2025-07-07 ENCOUNTER — CLINICAL SUPPORT (OUTPATIENT)
Dept: PRIMARY CARE | Facility: CLINIC | Age: 75
End: 2025-07-07
Payer: MEDICARE

## 2025-07-07 ENCOUNTER — TELEPHONE (OUTPATIENT)
Dept: PRIMARY CARE | Facility: CLINIC | Age: 75
End: 2025-07-07

## 2025-07-07 VITALS — DIASTOLIC BLOOD PRESSURE: 82 MMHG | SYSTOLIC BLOOD PRESSURE: 132 MMHG

## 2025-07-07 NOTE — PROGRESS NOTES
Pt came in for BP check with nurse. Pts BP cuff read 144/90. I let her relax for a few minutes alone and my reading was 132/82. Pt mentioned her BP cuff was old, I advised that maybe she get a new one to be sure the numbers are more accurate. She agreed and will get a new one. She noted she had not yet taken her BP meds this morning.

## 2025-07-23 ENCOUNTER — HOSPITAL ENCOUNTER (OUTPATIENT)
Dept: CARDIOLOGY | Facility: HOSPITAL | Age: 75
Discharge: HOME | End: 2025-07-23
Payer: MEDICARE

## 2025-07-23 DIAGNOSIS — R55 SYNCOPE AND COLLAPSE: ICD-10-CM

## 2025-07-23 DIAGNOSIS — R55 SYNCOPE AND COLLAPSE: Primary | ICD-10-CM

## 2025-08-13 ENCOUNTER — TELEPHONE (OUTPATIENT)
Dept: PRIMARY CARE | Facility: CLINIC | Age: 75
End: 2025-08-13
Payer: MEDICARE

## 2025-08-13 DIAGNOSIS — M25.552 LEFT HIP PAIN: ICD-10-CM

## 2025-08-13 DIAGNOSIS — Z12.31 VISIT FOR SCREENING MAMMOGRAM: ICD-10-CM

## 2025-08-13 DIAGNOSIS — Z12.31 ENCOUNTER FOR SCREENING MAMMOGRAM FOR BREAST CANCER: Primary | ICD-10-CM

## 2025-08-21 ENCOUNTER — HOSPITAL ENCOUNTER (OUTPATIENT)
Dept: CARDIOLOGY | Facility: HOSPITAL | Age: 75
Discharge: HOME | End: 2025-08-21
Payer: MEDICARE

## 2025-08-21 DIAGNOSIS — R55 SYNCOPE AND COLLAPSE: ICD-10-CM

## 2025-08-21 PROCEDURE — 93296 REM INTERROG EVL PM/IDS: CPT

## 2025-08-22 ENCOUNTER — HOSPITAL ENCOUNTER (OUTPATIENT)
Dept: RADIOLOGY | Facility: HOSPITAL | Age: 75
Discharge: HOME | End: 2025-08-22
Payer: MEDICARE

## 2025-08-22 VITALS — WEIGHT: 177.8 LBS | HEIGHT: 69 IN | BODY MASS INDEX: 26.33 KG/M2

## 2025-08-22 DIAGNOSIS — Z12.31 VISIT FOR SCREENING MAMMOGRAM: ICD-10-CM

## 2025-08-22 DIAGNOSIS — M25.552 LEFT HIP PAIN: ICD-10-CM

## 2025-08-22 PROCEDURE — 73502 X-RAY EXAM HIP UNI 2-3 VIEWS: CPT | Mod: LT

## 2025-08-22 PROCEDURE — 77063 BREAST TOMOSYNTHESIS BI: CPT

## 2025-08-22 PROCEDURE — 77067 SCR MAMMO BI INCL CAD: CPT

## 2025-09-30 ENCOUNTER — APPOINTMENT (OUTPATIENT)
Dept: ORTHOPEDIC SURGERY | Facility: CLINIC | Age: 75
End: 2025-09-30
Payer: MEDICARE

## 2025-12-22 ENCOUNTER — APPOINTMENT (OUTPATIENT)
Dept: RHEUMATOLOGY | Facility: CLINIC | Age: 75
End: 2025-12-22
Payer: MEDICARE

## 2026-01-08 ENCOUNTER — APPOINTMENT (OUTPATIENT)
Dept: PRIMARY CARE | Facility: CLINIC | Age: 76
End: 2026-01-08
Payer: MEDICARE

## (undated) DEVICE — DRAPE, INSTRUMENT, W/POUCH, STERI DRAPE, 7 X 11 IN, DISPOSABLE, STERILE

## (undated) DEVICE — MASK, FACE, TENET, FOAM POSITIONING, DISPOSABLE

## (undated) DEVICE — BLANKET, LOWER BODY, VHA PLUS, ADULT

## (undated) DEVICE — SUTURE, ETHIBOND, P2, V-37, 30 IN, GREEN

## (undated) DEVICE — DRILL BIT, 3.0MM GLENOID, V-SHAPE FLUTE, STERILE

## (undated) DEVICE — Device

## (undated) DEVICE — 3.0MM ARTHREX MGS DRILL BIT, STERILE

## (undated) DEVICE — GOWN, SURGICAL, SIRUS, NON REINFORCED, LARGE

## (undated) DEVICE — DRAPE, SHEET, U, STERI DRAPE, 47 X 51 IN, DISPOSABLE, STERILE

## (undated) DEVICE — DRAPE, SHEET, 17 X 23 IN

## (undated) DEVICE — ADHESIVE, DERMABOND, PRINEO, 12 X 9, STERILE

## (undated) DEVICE — GLOVE, PROTEXIS PI CLASSIC, SZ-6.5, PF, LF

## (undated) DEVICE — BLADE, SAW, SAGITTAL, 25.0 X 1.27 X 90MM

## (undated) DEVICE — SCREW, NON-LOCKING, 4.5MM X 24MM: Type: IMPLANTABLE DEVICE | Site: SHOULDER | Status: NON-FUNCTIONAL

## (undated) DEVICE — GLOVE, SURGICAL, PROTEXIS PI BLUE W/NEUTHERA, 7.0, PF, LF

## (undated) DEVICE — SUTURE, STRATAFIX, 3-0 MONOCRYL PLUS, PS-2 SPIRAL UNDYED

## (undated) DEVICE — STRIP, SKIN CLOSURE, STERI STRIP, REINFORCED, 0.5 X 4 IN

## (undated) DEVICE — HOOD, SURGICAL, FLYTE HYBRID

## (undated) DEVICE — COVER, BACK TABLE, 65 X 90, HVY REINFORCED

## (undated) DEVICE — SUTURE, MONOCRYL, 3-0, 27 IN, PS-2, UNDYED

## (undated) DEVICE — GLOVE, PROTEXIS PI CLASSIC, SZ-7.5, PF, LF

## (undated) DEVICE — ARTHREX REAMER HEAD, ANGLED, SMALL

## (undated) DEVICE — GLOVE, SURGICAL, PROTEXIS PI , 7.5, PF, LF

## (undated) DEVICE — DRAPE, INCISE, ANTIMICROBIAL, IOBAN 2, 13 X 13 IN, DISPOSABLE, STERILE

## (undated) DEVICE — SOLUTION, IRRIGATION, X RX SODIUM CHL 0.9%, 1000ML BTL

## (undated) DEVICE — SYRINGE, 60 CC, IRRIGATION, BULB, CONTRO-BULB, PAPER POUCH

## (undated) DEVICE — CLOSURE SYSTEM, DERMABOND, PRINEO, 22CM, STERILE